# Patient Record
Sex: FEMALE | Race: ASIAN | NOT HISPANIC OR LATINO
[De-identification: names, ages, dates, MRNs, and addresses within clinical notes are randomized per-mention and may not be internally consistent; named-entity substitution may affect disease eponyms.]

---

## 2017-03-01 ENCOUNTER — MED ADMIN CHARGE (OUTPATIENT)
Age: 27
End: 2017-03-01

## 2017-03-01 ENCOUNTER — APPOINTMENT (OUTPATIENT)
Dept: INTERNAL MEDICINE | Facility: CLINIC | Age: 27
End: 2017-03-01

## 2017-03-01 VITALS
TEMPERATURE: 98 F | WEIGHT: 136 LBS | SYSTOLIC BLOOD PRESSURE: 98 MMHG | HEART RATE: 91 BPM | BODY MASS INDEX: 26.7 KG/M2 | HEIGHT: 60 IN | DIASTOLIC BLOOD PRESSURE: 55 MMHG | OXYGEN SATURATION: 98 %

## 2017-03-01 DIAGNOSIS — Z91.89 OTHER SPECIFIED PERSONAL RISK FACTORS, NOT ELSEWHERE CLASSIFIED: ICD-10-CM

## 2017-03-01 DIAGNOSIS — Z00.00 ENCOUNTER FOR GENERAL ADULT MEDICAL EXAMINATION W/OUT ABNORMAL FINDINGS: ICD-10-CM

## 2017-03-01 RX ORDER — SOFT LENS DISINFECTANT
SOLUTION, NON-ORAL MISCELLANEOUS
Qty: 1 | Refills: 0 | Status: ACTIVE | COMMUNITY
Start: 2017-03-01 | End: 1900-01-01

## 2017-04-18 ENCOUNTER — APPOINTMENT (OUTPATIENT)
Dept: INTERNAL MEDICINE | Facility: CLINIC | Age: 27
End: 2017-04-18

## 2017-05-23 ENCOUNTER — APPOINTMENT (OUTPATIENT)
Dept: INTERNAL MEDICINE | Facility: CLINIC | Age: 27
End: 2017-05-23

## 2017-05-23 VITALS
BODY MASS INDEX: 27.48 KG/M2 | SYSTOLIC BLOOD PRESSURE: 100 MMHG | HEIGHT: 60 IN | DIASTOLIC BLOOD PRESSURE: 60 MMHG | WEIGHT: 140 LBS | TEMPERATURE: 98.4 F | HEART RATE: 87 BPM | OXYGEN SATURATION: 99 %

## 2017-05-23 DIAGNOSIS — Z23 ENCOUNTER FOR IMMUNIZATION: ICD-10-CM

## 2017-06-15 ENCOUNTER — APPOINTMENT (OUTPATIENT)
Dept: PULMONOLOGY | Facility: CLINIC | Age: 27
End: 2017-06-15

## 2018-01-12 ENCOUNTER — RX RENEWAL (OUTPATIENT)
Age: 28
End: 2018-01-12

## 2018-01-25 ENCOUNTER — RX RENEWAL (OUTPATIENT)
Age: 28
End: 2018-01-25

## 2018-01-29 ENCOUNTER — APPOINTMENT (OUTPATIENT)
Dept: INTERNAL MEDICINE | Facility: CLINIC | Age: 28
End: 2018-01-29
Payer: MEDICAID

## 2018-01-29 VITALS
SYSTOLIC BLOOD PRESSURE: 92 MMHG | HEART RATE: 95 BPM | WEIGHT: 150 LBS | DIASTOLIC BLOOD PRESSURE: 68 MMHG | TEMPERATURE: 98.2 F | OXYGEN SATURATION: 98 %

## 2018-01-29 DIAGNOSIS — Z23 ENCOUNTER FOR IMMUNIZATION: ICD-10-CM

## 2018-01-29 DIAGNOSIS — Z76.0 ENCOUNTER FOR ISSUE OF REPEAT PRESCRIPTION: ICD-10-CM

## 2018-01-29 DIAGNOSIS — M41.9 SCOLIOSIS, UNSPECIFIED: ICD-10-CM

## 2018-01-29 DIAGNOSIS — J45.901 UNSPECIFIED ASTHMA WITH (ACUTE) EXACERBATION: ICD-10-CM

## 2018-01-29 PROCEDURE — 99214 OFFICE O/P EST MOD 30 MIN: CPT | Mod: GC

## 2018-01-30 PROBLEM — Z76.0 MEDICATION REFILL: Status: ACTIVE | Noted: 2017-03-01

## 2018-01-30 PROBLEM — J45.901 MILD ASTHMA WITH ACUTE EXACERBATION, UNSPECIFIED WHETHER PERSISTENT: Status: ACTIVE | Noted: 2018-01-30

## 2018-02-26 ENCOUNTER — APPOINTMENT (OUTPATIENT)
Dept: PULMONOLOGY | Facility: CLINIC | Age: 28
End: 2018-02-26
Payer: MEDICAID

## 2018-02-26 ENCOUNTER — APPOINTMENT (OUTPATIENT)
Dept: INTERNAL MEDICINE | Facility: CLINIC | Age: 28
End: 2018-02-26
Payer: MEDICAID

## 2018-02-26 VITALS
HEART RATE: 82 BPM | TEMPERATURE: 97.8 F | OXYGEN SATURATION: 98 % | HEIGHT: 60 IN | SYSTOLIC BLOOD PRESSURE: 90 MMHG | WEIGHT: 150 LBS | BODY MASS INDEX: 29.45 KG/M2 | DIASTOLIC BLOOD PRESSURE: 80 MMHG

## 2018-02-26 PROCEDURE — 94060 EVALUATION OF WHEEZING: CPT

## 2018-02-26 PROCEDURE — 99204 OFFICE O/P NEW MOD 45 MIN: CPT | Mod: 25

## 2018-02-26 PROCEDURE — 94729 DIFFUSING CAPACITY: CPT

## 2018-02-26 PROCEDURE — 95012 NITRIC OXIDE EXP GAS DETER: CPT

## 2018-02-26 PROCEDURE — 94727 GAS DIL/WSHOT DETER LNG VOL: CPT

## 2018-02-26 PROCEDURE — 99214 OFFICE O/P EST MOD 30 MIN: CPT | Mod: GC

## 2018-02-26 RX ORDER — IBUPROFEN 800 MG/1
800 TABLET, FILM COATED ORAL
Qty: 16 | Refills: 0 | Status: ACTIVE | COMMUNITY
Start: 2018-01-24

## 2018-03-26 ENCOUNTER — APPOINTMENT (OUTPATIENT)
Dept: PULMONOLOGY | Facility: CLINIC | Age: 28
End: 2018-03-26
Payer: MEDICAID

## 2018-03-26 VITALS
WEIGHT: 150 LBS | HEART RATE: 82 BPM | SYSTOLIC BLOOD PRESSURE: 90 MMHG | DIASTOLIC BLOOD PRESSURE: 70 MMHG | OXYGEN SATURATION: 98 % | TEMPERATURE: 98 F | HEIGHT: 60 IN | BODY MASS INDEX: 29.45 KG/M2

## 2018-03-26 PROCEDURE — 94010 BREATHING CAPACITY TEST: CPT

## 2018-03-26 PROCEDURE — 99213 OFFICE O/P EST LOW 20 MIN: CPT | Mod: 25

## 2018-03-26 RX ORDER — SALMETEROL XINAFOATE 50 UG/1
50 POWDER, METERED ORAL; RESPIRATORY (INHALATION)
Qty: 1 | Refills: 0 | Status: DISCONTINUED | COMMUNITY
Start: 2018-01-29 | End: 2018-03-26

## 2018-05-17 ENCOUNTER — APPOINTMENT (OUTPATIENT)
Dept: PULMONOLOGY | Facility: CLINIC | Age: 28
End: 2018-05-17
Payer: MEDICAID

## 2018-05-17 VITALS
DIASTOLIC BLOOD PRESSURE: 68 MMHG | OXYGEN SATURATION: 97 % | HEART RATE: 73 BPM | HEIGHT: 60 IN | TEMPERATURE: 97.9 F | SYSTOLIC BLOOD PRESSURE: 96 MMHG | BODY MASS INDEX: 29.45 KG/M2 | WEIGHT: 150 LBS

## 2018-05-17 PROCEDURE — 95012 NITRIC OXIDE EXP GAS DETER: CPT

## 2018-05-17 PROCEDURE — 94010 BREATHING CAPACITY TEST: CPT

## 2018-05-17 PROCEDURE — 99213 OFFICE O/P EST LOW 20 MIN: CPT | Mod: 25

## 2018-06-14 ENCOUNTER — RX RENEWAL (OUTPATIENT)
Age: 28
End: 2018-06-14

## 2018-06-18 ENCOUNTER — RX RENEWAL (OUTPATIENT)
Age: 28
End: 2018-06-18

## 2018-07-05 ENCOUNTER — APPOINTMENT (OUTPATIENT)
Dept: PULMONOLOGY | Facility: CLINIC | Age: 28
End: 2018-07-05
Payer: MEDICAID

## 2018-07-05 VITALS
TEMPERATURE: 97.9 F | SYSTOLIC BLOOD PRESSURE: 86 MMHG | DIASTOLIC BLOOD PRESSURE: 62 MMHG | HEIGHT: 60 IN | HEART RATE: 86 BPM | OXYGEN SATURATION: 97 % | WEIGHT: 150 LBS | BODY MASS INDEX: 29.45 KG/M2

## 2018-07-05 PROCEDURE — 94010 BREATHING CAPACITY TEST: CPT

## 2018-07-05 PROCEDURE — 99213 OFFICE O/P EST LOW 20 MIN: CPT | Mod: 25

## 2018-07-05 RX ORDER — SOFT LENS DISINFECTANT
SOLUTION, NON-ORAL MISCELLANEOUS
Qty: 1 | Refills: 0 | Status: ACTIVE | COMMUNITY
Start: 2018-07-05 | End: 1900-01-01

## 2018-09-03 ENCOUNTER — RX RENEWAL (OUTPATIENT)
Age: 28
End: 2018-09-03

## 2018-10-15 ENCOUNTER — RX RENEWAL (OUTPATIENT)
Age: 28
End: 2018-10-15

## 2019-07-14 ENCOUNTER — EMERGENCY (EMERGENCY)
Facility: HOSPITAL | Age: 29
LOS: 1 days | Discharge: HOME | End: 2019-07-14
Attending: EMERGENCY MEDICINE | Admitting: EMERGENCY MEDICINE
Payer: COMMERCIAL

## 2019-07-14 VITALS
TEMPERATURE: 97 F | OXYGEN SATURATION: 100 % | RESPIRATION RATE: 18 BRPM | DIASTOLIC BLOOD PRESSURE: 70 MMHG | HEART RATE: 59 BPM | HEIGHT: 60 IN | WEIGHT: 149.91 LBS | SYSTOLIC BLOOD PRESSURE: 104 MMHG

## 2019-07-14 DIAGNOSIS — K80.20 CALCULUS OF GALLBLADDER WITHOUT CHOLECYSTITIS WITHOUT OBSTRUCTION: ICD-10-CM

## 2019-07-14 DIAGNOSIS — R10.11 RIGHT UPPER QUADRANT PAIN: ICD-10-CM

## 2019-07-14 LAB
ALBUMIN SERPL ELPH-MCNC: 4.5 G/DL — SIGNIFICANT CHANGE UP (ref 3.3–5)
ALP SERPL-CCNC: 86 U/L — SIGNIFICANT CHANGE UP (ref 40–120)
ALT FLD-CCNC: 31 U/L — SIGNIFICANT CHANGE UP (ref 10–45)
ANION GAP SERPL CALC-SCNC: 9 MMOL/L — SIGNIFICANT CHANGE UP (ref 5–17)
APPEARANCE UR: ABNORMAL
AST SERPL-CCNC: 52 U/L — HIGH (ref 10–40)
BASOPHILS # BLD AUTO: 0 K/UL — SIGNIFICANT CHANGE UP (ref 0–0.2)
BASOPHILS NFR BLD AUTO: 0 % — SIGNIFICANT CHANGE UP (ref 0–2)
BILIRUB SERPL-MCNC: 1.5 MG/DL — HIGH (ref 0.2–1.2)
BILIRUB UR-MCNC: NEGATIVE — SIGNIFICANT CHANGE UP
BUN SERPL-MCNC: 13 MG/DL — SIGNIFICANT CHANGE UP (ref 7–23)
CALCIUM SERPL-MCNC: 9.1 MG/DL — SIGNIFICANT CHANGE UP (ref 8.4–10.5)
CHLORIDE SERPL-SCNC: 104 MMOL/L — SIGNIFICANT CHANGE UP (ref 96–108)
CO2 SERPL-SCNC: 27 MMOL/L — SIGNIFICANT CHANGE UP (ref 22–31)
COLOR SPEC: YELLOW — SIGNIFICANT CHANGE UP
CREAT SERPL-MCNC: 0.72 MG/DL — SIGNIFICANT CHANGE UP (ref 0.5–1.3)
DIFF PNL FLD: NEGATIVE — SIGNIFICANT CHANGE UP
EOSINOPHIL # BLD AUTO: 0 K/UL — SIGNIFICANT CHANGE UP (ref 0–0.5)
EOSINOPHIL NFR BLD AUTO: 0 % — SIGNIFICANT CHANGE UP (ref 0–6)
GLUCOSE SERPL-MCNC: 168 MG/DL — HIGH (ref 70–99)
GLUCOSE UR QL: NEGATIVE — SIGNIFICANT CHANGE UP
HCG UR QL: NEGATIVE — SIGNIFICANT CHANGE UP
HCT VFR BLD CALC: 36.7 % — SIGNIFICANT CHANGE UP (ref 34.5–45)
HGB BLD-MCNC: 11.1 G/DL — LOW (ref 11.5–15.5)
KETONES UR-MCNC: NEGATIVE — SIGNIFICANT CHANGE UP
LEUKOCYTE ESTERASE UR-ACNC: NEGATIVE — SIGNIFICANT CHANGE UP
LIDOCAIN IGE QN: 26 U/L — SIGNIFICANT CHANGE UP (ref 7–60)
LYMPHOCYTES # BLD AUTO: 1.09 K/UL — SIGNIFICANT CHANGE UP (ref 1–3.3)
LYMPHOCYTES # BLD AUTO: 7.1 % — LOW (ref 13–44)
MCHC RBC-ENTMCNC: 20.6 PG — LOW (ref 27–34)
MCHC RBC-ENTMCNC: 30.2 GM/DL — LOW (ref 32–36)
MCV RBC AUTO: 68.2 FL — LOW (ref 80–100)
MONOCYTES # BLD AUTO: 0.54 K/UL — SIGNIFICANT CHANGE UP (ref 0–0.9)
MONOCYTES NFR BLD AUTO: 3.5 % — SIGNIFICANT CHANGE UP (ref 2–14)
NEUTROPHILS # BLD AUTO: 13.62 K/UL — HIGH (ref 1.8–7.4)
NEUTROPHILS NFR BLD AUTO: 87.6 % — HIGH (ref 43–77)
NITRITE UR-MCNC: NEGATIVE — SIGNIFICANT CHANGE UP
PH UR: 7 — SIGNIFICANT CHANGE UP (ref 5–8)
PLATELET # BLD AUTO: 319 K/UL — SIGNIFICANT CHANGE UP (ref 150–400)
POTASSIUM SERPL-MCNC: 4.2 MMOL/L — SIGNIFICANT CHANGE UP (ref 3.5–5.3)
POTASSIUM SERPL-SCNC: 4.2 MMOL/L — SIGNIFICANT CHANGE UP (ref 3.5–5.3)
PROT SERPL-MCNC: 7.9 G/DL — SIGNIFICANT CHANGE UP (ref 6–8.3)
PROT UR-MCNC: ABNORMAL MG/DL
RBC # BLD: 5.38 M/UL — HIGH (ref 3.8–5.2)
RBC # FLD: 14.8 % — HIGH (ref 10.3–14.5)
SODIUM SERPL-SCNC: 140 MMOL/L — SIGNIFICANT CHANGE UP (ref 135–145)
SP GR SPEC: 1.02 — SIGNIFICANT CHANGE UP (ref 1–1.03)
UROBILINOGEN FLD QL: 1 E.U./DL — SIGNIFICANT CHANGE UP
WBC # BLD: 15.39 K/UL — HIGH (ref 3.8–10.5)
WBC # FLD AUTO: 15.39 K/UL — HIGH (ref 3.8–10.5)

## 2019-07-14 PROCEDURE — 76705 ECHO EXAM OF ABDOMEN: CPT | Mod: 26

## 2019-07-14 PROCEDURE — 83690 ASSAY OF LIPASE: CPT

## 2019-07-14 PROCEDURE — 76705 ECHO EXAM OF ABDOMEN: CPT

## 2019-07-14 PROCEDURE — 85025 COMPLETE CBC W/AUTO DIFF WBC: CPT

## 2019-07-14 PROCEDURE — 36415 COLL VENOUS BLD VENIPUNCTURE: CPT

## 2019-07-14 PROCEDURE — 96374 THER/PROPH/DIAG INJ IV PUSH: CPT

## 2019-07-14 PROCEDURE — 99284 EMERGENCY DEPT VISIT MOD MDM: CPT | Mod: 25

## 2019-07-14 PROCEDURE — 81025 URINE PREGNANCY TEST: CPT

## 2019-07-14 PROCEDURE — 99285 EMERGENCY DEPT VISIT HI MDM: CPT

## 2019-07-14 PROCEDURE — 80053 COMPREHEN METABOLIC PANEL: CPT

## 2019-07-14 PROCEDURE — 81001 URINALYSIS AUTO W/SCOPE: CPT

## 2019-07-14 PROCEDURE — 96375 TX/PRO/DX INJ NEW DRUG ADDON: CPT

## 2019-07-14 RX ORDER — MORPHINE SULFATE 50 MG/1
4 CAPSULE, EXTENDED RELEASE ORAL ONCE
Refills: 0 | Status: DISCONTINUED | OUTPATIENT
Start: 2019-07-14 | End: 2019-07-14

## 2019-07-14 RX ORDER — SODIUM CHLORIDE 9 MG/ML
1000 INJECTION INTRAMUSCULAR; INTRAVENOUS; SUBCUTANEOUS ONCE
Refills: 0 | Status: COMPLETED | OUTPATIENT
Start: 2019-07-14 | End: 2019-07-14

## 2019-07-14 RX ORDER — FAMOTIDINE 10 MG/ML
20 INJECTION INTRAVENOUS ONCE
Refills: 0 | Status: COMPLETED | OUTPATIENT
Start: 2019-07-14 | End: 2019-07-14

## 2019-07-14 RX ADMIN — SODIUM CHLORIDE 1000 MILLILITER(S): 9 INJECTION INTRAMUSCULAR; INTRAVENOUS; SUBCUTANEOUS at 16:54

## 2019-07-14 RX ADMIN — FAMOTIDINE 20 MILLIGRAM(S): 10 INJECTION INTRAVENOUS at 16:54

## 2019-07-14 RX ADMIN — MORPHINE SULFATE 4 MILLIGRAM(S): 50 CAPSULE, EXTENDED RELEASE ORAL at 16:54

## 2019-07-14 NOTE — ED PROVIDER NOTE - PHYSICAL EXAMINATION
CONSTITUTIONAL: Well-appearing; well-nourished; in no apparent distress.   HEAD: Normocephalic; atraumatic.   EYES: PERRL; EOM intact; conjunctiva and sclera clear  ENT: normal nose; no rhinorrhea; normal pharynx with no erythema or lesions.   NECK: Supple; non-tender; no LAD  CARDIOVASCULAR: Normal S1, S2; no murmurs, rubs, or gallops. Regular rate and rhythm.   RESPIRATORY: Breathing easily; breath sounds clear and equal bilaterally; no wheezes, rhonchi, or rales.  GI: Soft; non-distended; RUQ tenderness to palpation; no rebound, no guarding  MSK: FROM at all extremities, normal tone   EXT: No cyanosis or edema; N/V intact  SKIN: Normal for age and race; warm; dry; good turgor; no apparent lesions or rash.   NEURO: A & O x 3; face symmetric; grossly unremarkable.   PSYCHOLOGICAL: The patient’s mood and manner are appropriate.

## 2019-07-14 NOTE — ED PROVIDER NOTE - OBJECTIVE STATEMENT
29 y/o F with a past medical history of asthma and thalassemia presents to the ED complaining of epigastric pain that radiates to her back. Patient woke up this morning and was able to PO coffee and pancakes. Later in the day, she experienced abdominal pain and notes increased bowel movement with soft stool. She denies diarrhea, bloody stool, dysuria, nausea, vomiting, and recent travel or sick contact. Patient has no LMP as she has a IUD. 27 y/o F with a past medical history of asthma and thalassemia presents to the ED complaining of epigastric pain that radiates to her back. Patient woke up this morning and was able to eat coffee and pancakes. Later in the day, she experienced abdominal pain and notes increased bowel movement with soft stool. She denies diarrhea, bloody stool, dysuria, nausea, vomiting, and recent travel or sick contact. Denies daily etoh or NSAID use. Denies smoking. Patient has no LMP as she has a IUD.

## 2019-07-14 NOTE — ED PROVIDER NOTE - ATTENDING CONTRIBUTION TO CARE
Hx of asthma, thalassemia w epigastric pain going to back, no f/c, soft stool non bloody noted today, low appetite. sx ongoing for one day. Well appearing, nad, nc/at, lung cta, heart reg, abd soft, tender in ruq,  ext no gross deformity, no gross neuro deficits, obtaining labs, US and reevaluation. Hx of asthma, thalassemia w epigastric pain going to back, no f/c, soft stool non bloody noted today, low appetite. sx ongoing for one day. Well appearing, nad, nc/at, lung cta, heart reg, abd soft, mild epigastric tenderness,  ext no gross deformity, no gross neuro deficits, obtaining labs, US and reevaluation.

## 2019-07-14 NOTE — ED ADULT TRIAGE NOTE - ARRIVAL INFO ADDITIONAL COMMENTS
c.o diffuse abd pain and multiple episodes of soft stool since this morning. denies any nausea, vomiting, fever/chills

## 2019-07-14 NOTE — ED PROVIDER NOTE - CLINICAL SUMMARY MEDICAL DECISION MAKING FREE TEXT BOX
29 y/o F with history of asthma and thalassemia complains of epigastric pain  that radiates to her back. Increased sense of belching and soft stools; afebrile. On exam with RUQ tenderness to palpation. Will order ultrasound of abdomen to rule out cholecystitis.

## 2019-07-14 NOTE — ED PROVIDER NOTE - NSFOLLOWUPINSTRUCTIONS_ED_ALL_ED_FT
Gallstones    WHAT YOU NEED TO KNOW:    Gallstones are hard substances that form in your gallbladder or bile duct. Your gallbladder and bile duct are located on the right side of your abdomen, near your liver. Your gallbladder stores bile. Bile helps break down the fat that you eat. Your gallbladder also helps remove certain chemicals from your body.Gallstones         DISCHARGE INSTRUCTIONS:    Return to the emergency department if:     You have a fever and chills.      Your skin or eyes turn yellow.      You have severe pain in your upper abdomen, just below the right ribcage.    Contact your healthcare provider if:     You have nausea and are vomiting.      Your urine is dark.      You have maryann-colored bowel movements.      You have questions or concerns about your condition or care.    Medicines:     Prescription pain medicine may be given. Ask your healthcare provider how to take this medicine safely.      Take your medicine as directed. Contact your healthcare provider if you think your medicine is not helping or if you have side effects. Tell him or her if you are allergic to any medicine. Keep a list of the medicines, vitamins, and herbs you take. Include the amounts, and when and why you take them. Bring the list or the pill bottles to follow-up visits. Carry your medicine list with you in case of an emergency.    What you can do to manage or prevent gallstones:     Eat a variety of healthy foods. This may help you have more energy and heal faster. Healthy foods include fruits, vegetables, whole-grain breads, low-fat dairy products, beans, lean meat, and fish. Ask if you need to be on a special diet. Try to eat regular meals during the day. This will help your gallbladder empty.      Exercise as directed. Talk to your healthcare provider about the best exercise plan for you. Exercise can help you lose weight and improve your health.      Manage your weight. If you are overweight, it is important to reach a healthy weight. You will need to lose weight slowly because rapid weight loss can increase your risk for gallstones. Talk to your healthcare provider about your weight. He or she can help you create a safe weight loss plan if you need to lose weight.    Follow up with your healthcare provider as directed: Write down your questions so you remember to ask them during your visits.     Biliary Colic    WHAT YOU NEED TO KNOW:    Biliary colic is severe pain in your upper abdomen caused by a gallbladder problem. Your gallbladder stores bile, which helps break down the fats that you eat. Gallbladder         DISCHARGE INSTRUCTIONS:    Medicines:     Medicines can help decrease pain and muscle spasms. You may also need medicine to calm your stomach and stop vomiting.      Take your medicine as directed. Contact your healthcare provider if you think your medicine is not helping or you have side effects. Tell him if you are allergic to any medicine. Keep a list of the medicines, vitamins, and herbs you take. Include the amounts, and when and why you take them. Bring the list or the pill bottles to follow-up visits. Carry your medicine list with you in case of an emergency.    Avoid alcohol: Alcohol can damage your gallbladder and make your symptoms worse.    Maintain a healthy weight: Ask your healthcare provider how much you should weigh. Ask him to help you create a weight loss plan if you are overweight.     Eat a variety of healthy foods: Healthy foods include fruits, vegetables, whole-grain breads, low-fat dairy products, beans, lean meats, and fish. Foods that are high in fiber and low in fat and cholesterol may decrease your symptoms. Ask if you need to be on a special diet.    Exercise: Ask your healthcare provider about the best exercise plan for you. Exercise may help improve your symptoms.    Follow up with your healthcare provider as directed: Bring a list of any questions you have so you remember to ask them during your visits.     Contact your healthcare provider if:     You have a fever.      Your pain gets worse, even after you take medicine.      You have nausea or are vomiting.      Your skin or eyes are yellow.      You have questions or concerns about your condition or care.    Return to the emergency department if:     You have severe pain.      You feel like you are going to faint.      You are short of breath.      Avoid the following foods for a healthy gallbladder diet:   vegetable oil  peanut oil  refined white foods (breads, pastas, etc.)  foods high in fat   processed foods

## 2019-07-14 NOTE — ED ADULT NURSE NOTE - OBJECTIVE STATEMENT
Patient is a 27yo F, arrived to ED via walk-in, AAOx3, in NAD, VSS, complaining of abdominal pain, back pain and soft stools since this morning.  Patient denies any N/V/D, fevers, chills, urinary complaints or any other complaints at this time.  PIV placed, labs drawn and sent.

## 2019-07-14 NOTE — ED PROVIDER NOTE - CARE PROVIDER_API CALL
Mario Lane (MD)  Surgery  [address], [place] [address]  Phone: [phone]  Fax: [phone]  Follow Up Time:     Olivia Bryson)  Surgery  [address], [address]  [place] [address]  Phone: [phone]  Fax: [phone]  Follow Up Time:

## 2019-07-22 ENCOUNTER — EMERGENCY (EMERGENCY)
Facility: HOSPITAL | Age: 29
LOS: 1 days | Discharge: HOME | End: 2019-07-22
Attending: EMERGENCY MEDICINE | Admitting: EMERGENCY MEDICINE
Payer: MEDICAID

## 2019-07-22 VITALS
SYSTOLIC BLOOD PRESSURE: 100 MMHG | TEMPERATURE: 98 F | HEART RATE: 91 BPM | HEIGHT: 60 IN | WEIGHT: 149.91 LBS | DIASTOLIC BLOOD PRESSURE: 63 MMHG | OXYGEN SATURATION: 100 % | RESPIRATION RATE: 16 BRPM

## 2019-07-22 VITALS
DIASTOLIC BLOOD PRESSURE: 70 MMHG | SYSTOLIC BLOOD PRESSURE: 108 MMHG | HEART RATE: 90 BPM | TEMPERATURE: 98 F | OXYGEN SATURATION: 100 % | RESPIRATION RATE: 16 BRPM

## 2019-07-22 PROBLEM — D56.9 THALASSEMIA, UNSPECIFIED: Chronic | Status: ACTIVE | Noted: 2019-07-14

## 2019-07-22 PROBLEM — J45.909 UNSPECIFIED ASTHMA, UNCOMPLICATED: Chronic | Status: ACTIVE | Noted: 2019-07-14

## 2019-07-22 LAB
ALBUMIN SERPL ELPH-MCNC: 4.3 G/DL — SIGNIFICANT CHANGE UP (ref 3.3–5)
ALP SERPL-CCNC: 91 U/L — SIGNIFICANT CHANGE UP (ref 40–120)
ALT FLD-CCNC: SIGNIFICANT CHANGE UP U/L (ref 10–45)
ANION GAP SERPL CALC-SCNC: 10 MMOL/L — SIGNIFICANT CHANGE UP (ref 5–17)
AST SERPL-CCNC: SIGNIFICANT CHANGE UP U/L (ref 10–40)
BASOPHILS # BLD AUTO: 0 K/UL — SIGNIFICANT CHANGE UP (ref 0–0.2)
BASOPHILS NFR BLD AUTO: 0 % — SIGNIFICANT CHANGE UP (ref 0–2)
BILIRUB SERPL-MCNC: 0.4 MG/DL — SIGNIFICANT CHANGE UP (ref 0.2–1.2)
BUN SERPL-MCNC: 13 MG/DL — SIGNIFICANT CHANGE UP (ref 7–23)
CALCIUM SERPL-MCNC: 8.8 MG/DL — SIGNIFICANT CHANGE UP (ref 8.4–10.5)
CHLORIDE SERPL-SCNC: 104 MMOL/L — SIGNIFICANT CHANGE UP (ref 96–108)
CO2 SERPL-SCNC: 26 MMOL/L — SIGNIFICANT CHANGE UP (ref 22–31)
CREAT SERPL-MCNC: 0.65 MG/DL — SIGNIFICANT CHANGE UP (ref 0.5–1.3)
EOSINOPHIL # BLD AUTO: 0.98 K/UL — HIGH (ref 0–0.5)
EOSINOPHIL NFR BLD AUTO: 10.1 % — HIGH (ref 0–6)
GLUCOSE SERPL-MCNC: 101 MG/DL — HIGH (ref 70–99)
HCT VFR BLD CALC: 35.4 % — SIGNIFICANT CHANGE UP (ref 34.5–45)
HGB BLD-MCNC: 10.6 G/DL — LOW (ref 11.5–15.5)
LIDOCAIN IGE QN: 33 U/L — SIGNIFICANT CHANGE UP (ref 7–60)
LYMPHOCYTES # BLD AUTO: 2.22 K/UL — SIGNIFICANT CHANGE UP (ref 1–3.3)
LYMPHOCYTES # BLD AUTO: 22.9 % — SIGNIFICANT CHANGE UP (ref 13–44)
MCHC RBC-ENTMCNC: 20.4 PG — LOW (ref 27–34)
MCHC RBC-ENTMCNC: 29.9 GM/DL — LOW (ref 32–36)
MCV RBC AUTO: 68.1 FL — LOW (ref 80–100)
MONOCYTES # BLD AUTO: 0.36 K/UL — SIGNIFICANT CHANGE UP (ref 0–0.9)
MONOCYTES NFR BLD AUTO: 3.7 % — SIGNIFICANT CHANGE UP (ref 2–14)
NEUTROPHILS # BLD AUTO: 6.14 K/UL — SIGNIFICANT CHANGE UP (ref 1.8–7.4)
NEUTROPHILS NFR BLD AUTO: 54.1 % — SIGNIFICANT CHANGE UP (ref 43–77)
PLATELET # BLD AUTO: 295 K/UL — SIGNIFICANT CHANGE UP (ref 150–400)
POTASSIUM SERPL-MCNC: SIGNIFICANT CHANGE UP MMOL/L (ref 3.5–5.3)
POTASSIUM SERPL-SCNC: SIGNIFICANT CHANGE UP MMOL/L (ref 3.5–5.3)
PROT SERPL-MCNC: 7.5 G/DL — SIGNIFICANT CHANGE UP (ref 6–8.3)
RBC # BLD: 5.2 M/UL — SIGNIFICANT CHANGE UP (ref 3.8–5.2)
RBC # FLD: 14.8 % — HIGH (ref 10.3–14.5)
SODIUM SERPL-SCNC: 140 MMOL/L — SIGNIFICANT CHANGE UP (ref 135–145)
WBC # BLD: 9.7 K/UL — SIGNIFICANT CHANGE UP (ref 3.8–10.5)
WBC # FLD AUTO: 9.7 K/UL — SIGNIFICANT CHANGE UP (ref 3.8–10.5)

## 2019-07-22 PROCEDURE — 80053 COMPREHEN METABOLIC PANEL: CPT

## 2019-07-22 PROCEDURE — 76705 ECHO EXAM OF ABDOMEN: CPT

## 2019-07-22 PROCEDURE — 99285 EMERGENCY DEPT VISIT HI MDM: CPT

## 2019-07-22 PROCEDURE — 36415 COLL VENOUS BLD VENIPUNCTURE: CPT

## 2019-07-22 PROCEDURE — 85025 COMPLETE CBC W/AUTO DIFF WBC: CPT

## 2019-07-22 PROCEDURE — 96374 THER/PROPH/DIAG INJ IV PUSH: CPT

## 2019-07-22 PROCEDURE — 83690 ASSAY OF LIPASE: CPT

## 2019-07-22 PROCEDURE — 76705 ECHO EXAM OF ABDOMEN: CPT | Mod: 26

## 2019-07-22 PROCEDURE — 96375 TX/PRO/DX INJ NEW DRUG ADDON: CPT

## 2019-07-22 PROCEDURE — 99284 EMERGENCY DEPT VISIT MOD MDM: CPT | Mod: 25

## 2019-07-22 RX ORDER — MORPHINE SULFATE 50 MG/1
4 CAPSULE, EXTENDED RELEASE ORAL ONCE
Refills: 0 | Status: DISCONTINUED | OUTPATIENT
Start: 2019-07-22 | End: 2019-07-22

## 2019-07-22 RX ORDER — FAMOTIDINE 10 MG/ML
20 INJECTION INTRAVENOUS ONCE
Refills: 0 | Status: COMPLETED | OUTPATIENT
Start: 2019-07-22 | End: 2019-07-22

## 2019-07-22 RX ORDER — FAMOTIDINE 10 MG/ML
1 INJECTION INTRAVENOUS
Qty: 14 | Refills: 0
Start: 2019-07-22 | End: 2019-07-28

## 2019-07-22 RX ORDER — SODIUM CHLORIDE 9 MG/ML
1000 INJECTION INTRAMUSCULAR; INTRAVENOUS; SUBCUTANEOUS ONCE
Refills: 0 | Status: COMPLETED | OUTPATIENT
Start: 2019-07-22 | End: 2019-07-22

## 2019-07-22 RX ADMIN — SODIUM CHLORIDE 1000 MILLILITER(S): 9 INJECTION INTRAMUSCULAR; INTRAVENOUS; SUBCUTANEOUS at 05:50

## 2019-07-22 RX ADMIN — MORPHINE SULFATE 4 MILLIGRAM(S): 50 CAPSULE, EXTENDED RELEASE ORAL at 05:50

## 2019-07-22 RX ADMIN — FAMOTIDINE 20 MILLIGRAM(S): 10 INJECTION INTRAVENOUS at 05:48

## 2019-07-22 NOTE — ED ADULT NURSE NOTE - NSIMPLEMENTINTERV_GEN_ALL_ED
Implemented All Universal Safety Interventions:  [employer] to call system. Call bell, personal items and telephone within reach. Instruct patient to call for assistance. Room bathroom lighting operational. Non-slip footwear when patient is off stretcher. Physically safe environment: no spills, clutter or unnecessary equipment. Stretcher in lowest position, wheels locked, appropriate side rails in place.

## 2019-07-22 NOTE — ED PROVIDER NOTE - NSFOLLOWUPINSTRUCTIONS_ED_ALL_ED_FT
Biliary Colic    WHAT YOU NEED TO KNOW:  Biliary colic is severe pain in your upper abdomen caused by a gallbladder problem. Your gallbladder stores bile, which helps break down the fats that you eat. Gallbladder  DISCHARGE INSTRUCTIONS:  Medicines:   Medicines can help decrease pain and muscle spasms. You may also need medicine to calm your stomach and stop vomiting.  Take your medicine as directed. Contact your healthcare provider if you think your medicine is not helping or you have side effects. Tell him if you are allergic to any medicine. Keep a list of the medicines, vitamins, and herbs you take. Include the amounts, and when and why you take them. Bring the list or the pill bottles to follow-up visits. Carry your medicine list with you in case of an emergency.  Avoid alcohol: Alcohol can damage your gallbladder and make your symptoms worse.  Maintain a healthy weight: Ask your healthcare provider how much you should weigh. Ask him to help you create a weight loss plan if you are overweight.   Eat a variety of healthy foods: Healthy foods include fruits, vegetables, whole-grain breads, low-fat dairy products, beans, lean meats, and fish. Foods that are high in fiber and low in fat and cholesterol may decrease your symptoms. Ask if you need to be on a special diet.  Exercise: Ask your healthcare provider about the best exercise plan for you. Exercise may help improve your symptoms.  Follow up with your healthcare provider as directed: Bring a list of any questions you have so you remember to ask them during your visits.   Contact your healthcare provider if:   You have a fever.  Your pain gets worse, even after you take medicine.  You have nausea or are vomiting.  Your skin or eyes are yellow.  You have questions or concerns about your condition or care.  Return to the emergency department if:   You have severe pain.  You feel like you are going to faint.  You are short of breath.

## 2019-07-22 NOTE — ED PROVIDER NOTE - ATTENDING CONTRIBUTION TO CARE
Attending Statement: I have personally performed a face to face diagnostic evaluation on this patient. I have reviewed the ACP note and agree with the history, exam and plan of care, except as noted.     Attending Contribution to Care:  28F with h/o gallstones who p/w sx c/w biliary colic after dietary indiscretion. Labs with no emergent findings, bili and LFts normal. Repeat US neg for cholecystitis. Pts pain completely resolved and thus she is stable for outpt follow up, return precautions and diet modifications discussed at length, will start h2 blocker, prn pain meds, to f/u w/surg and gi, pt understands and agrees w/plan

## 2019-07-22 NOTE — ED ADULT TRIAGE NOTE - CHIEF COMPLAINT QUOTE
c/o abdominal pain x 1 hour. epigastric area, states she was here x 2 days ago and was told she had gallstones.

## 2019-07-22 NOTE — ED PROVIDER NOTE - CLINICAL SUMMARY MEDICAL DECISION MAKING FREE TEXT BOX
pt was dx'd w/gallstones on 7/14, last night ate fried foods and started to have abd pain after, will check labs and repeat us to eval for acute cholecystitis, ivf and meds for symptom control, labs pt was dx'd w/gallstones on 7/14, last night ate fried foods and started to have abd pain after, will check labs and repeat us to eval for acute cholecystitis, ivf and meds for symptom control, labs wnl, diet modifications discussed at length, will start h2 blocker, prn pain meds, to f/u w/surg and gi, pt understands and agrees w/plan pt was dx'd w/gallstones on 7/14, last night ate fried foods and started to have abd pain after, will check labs and repeat us to eval for acute cholecystitis although suspect biliary colic, ivf and meds for symptom control, labs wnl, diet modifications discussed at length, will start h2 blocker, prn pain meds, to f/u w/surg and gi, pt understands and agrees w/plan

## 2019-07-22 NOTE — ED PROVIDER NOTE - OBJECTIVE STATEMENT
The pt is a 29 y/o F, who presents to ED c/o R sided abd pain - pt was seen on 7/14 and dx'd w/gallstones, yest ate lomein and fried finger foods, pain started shortly after. Pain is 7/10, constant and spasm like, pain to R upper abd and radiates to the back, has not taken any pain meds. Denies cp, sob, n/v/d, fevers, chills, dysuria

## 2019-07-26 DIAGNOSIS — R10.9 UNSPECIFIED ABDOMINAL PAIN: ICD-10-CM

## 2019-07-26 DIAGNOSIS — K80.50 CALCULUS OF BILE DUCT WITHOUT CHOLANGITIS OR CHOLECYSTITIS WITHOUT OBSTRUCTION: ICD-10-CM

## 2019-07-26 DIAGNOSIS — R10.11 RIGHT UPPER QUADRANT PAIN: ICD-10-CM

## 2019-07-26 DIAGNOSIS — R10.13 EPIGASTRIC PAIN: ICD-10-CM

## 2020-01-31 ENCOUNTER — APPOINTMENT (OUTPATIENT)
Dept: PULMONOLOGY | Facility: CLINIC | Age: 30
End: 2020-01-31
Payer: MEDICAID

## 2020-01-31 VITALS
HEIGHT: 60 IN | HEART RATE: 80 BPM | BODY MASS INDEX: 28.66 KG/M2 | SYSTOLIC BLOOD PRESSURE: 120 MMHG | WEIGHT: 146 LBS | OXYGEN SATURATION: 98 % | DIASTOLIC BLOOD PRESSURE: 80 MMHG | TEMPERATURE: 98 F

## 2020-01-31 PROCEDURE — 99213 OFFICE O/P EST LOW 20 MIN: CPT | Mod: 25

## 2020-01-31 PROCEDURE — G0008: CPT

## 2020-01-31 PROCEDURE — 90686 IIV4 VACC NO PRSV 0.5 ML IM: CPT

## 2020-01-31 PROCEDURE — 94010 BREATHING CAPACITY TEST: CPT

## 2020-01-31 RX ORDER — AZITHROMYCIN 250 MG/1
250 TABLET, FILM COATED ORAL
Qty: 6 | Refills: 0 | Status: DISCONTINUED | COMMUNITY
Start: 2017-12-28 | End: 2020-01-31

## 2020-01-31 RX ORDER — FLUTICASONE FUROATE 200 UG/1
200 POWDER RESPIRATORY (INHALATION) DAILY
Qty: 1 | Refills: 11 | Status: DISCONTINUED | COMMUNITY
Start: 2018-05-17 | End: 2020-01-31

## 2020-01-31 NOTE — HISTORY OF PRESENT ILLNESS
[TextBox_4] : 2/26/18: Asked to evaluate patient by Dr Moore for asthma. She was seen at [facility] a month ago on Flovent and was increased to Flovent 220mcg 2 puffs bid + Serevent bid. Misses 1-2x a week. On this regimen she needs her rescue less, 1-2x a week. Sx are dyspnea and wheeze. No more nocturnal awakenings. Has been hospitalized in the [facility] 3-4x a year, none in the last year. Maybe 1 course steroids last year. 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 Traveling to [facility] soon.\par \par 3/26/18: Using Breo daily and no longer needing rescue, no nocturnal awakenings, no limitations. On montelukast. Traveling to [facility] in about 3 weeks.\par \par 5/17/18: Went to the [facility] and got her 4 girls and is stressed. Running low on Ventolin. Needed neb last night. She returned a week ago or so - she can't remember and refers to her small daughter. Needing albuterol qod or so. Taking Breo every night, misses it twice a week. Still on montelukast. No prednisone since seeing me last.\par \par 7/5/18 [[place]]: Patient returned for a scheduled visit. She has not picked up her Arnuity but has not had symptoms since her last visit. She states that she has not used her rescue inhaler at all, and is fully compliant with her Breo Ellipta. She requested a new nebulizer though as her last one broke down and she would like to have one in case she experiences acute respiratory symptoms. \par  \par 1/31/20: Doing well on Breo, barely needs albuterol, no nocturnal symptoms, no activity limitation. Got back last time she ran out of Breo.

## 2020-01-31 NOTE — PHYSICAL EXAM
[No Acute Distress] : no acute distress [Normal S1, S2] : normal s1, s2 [Normal Rate/Rhythm] : normal rate/rhythm [No Resp Distress] : no resp distress [No Murmurs] : no murmurs [Clear to Auscultation Bilaterally] : clear to auscultation bilaterally

## 2020-01-31 NOTE — ASSESSMENT
[FreeTextEntry1] : Data reviewed:\par \par PA/lat CXR [facility] 12/28/17: clear lungs\par \par [place] 2/26/18: severe obstruction, FEV1 49% / FENO 93\par PFT 2/26/18: moderate obstruction, FEV1 57%, sig response to IBD, normal volumes and DLCO\par [place] 3/26/18: mod obstruction, FEV1 64%\par [place] 5/17/18: mod obstruction, FEV1 69% / FENO 79\par [place] 7/5/18: mod obstruction, FEV1 69%\par [place] 1/31/20: mild obstruction, FEV1 72%\par \par Impression:\par Moderate persistent asthma, well-controlled\par \par Plan:\par Continue Breo.\par If cont to do well then at next visit can consider stepdown.\par Flu vaccine given.\par Can follow 1 yr / sooner as needed.

## 2020-02-21 ENCOUNTER — EMERGENCY (EMERGENCY)
Facility: HOSPITAL | Age: 30
LOS: 1 days | Discharge: HOME | End: 2020-02-21
Attending: EMERGENCY MEDICINE | Admitting: EMERGENCY MEDICINE
Payer: MEDICAID

## 2020-02-21 VITALS
HEIGHT: 60 IN | TEMPERATURE: 98 F | DIASTOLIC BLOOD PRESSURE: 65 MMHG | HEART RATE: 82 BPM | OXYGEN SATURATION: 98 % | SYSTOLIC BLOOD PRESSURE: 99 MMHG | WEIGHT: 149.91 LBS | RESPIRATION RATE: 18 BRPM

## 2020-02-21 VITALS
HEART RATE: 92 BPM | SYSTOLIC BLOOD PRESSURE: 115 MMHG | TEMPERATURE: 98 F | OXYGEN SATURATION: 100 % | RESPIRATION RATE: 18 BRPM | DIASTOLIC BLOOD PRESSURE: 69 MMHG

## 2020-02-21 DIAGNOSIS — Z98.891 HISTORY OF UTERINE SCAR FROM PREVIOUS SURGERY: Chronic | ICD-10-CM

## 2020-02-21 LAB
ALBUMIN SERPL ELPH-MCNC: 4.5 G/DL — SIGNIFICANT CHANGE UP (ref 3.3–5)
ALP SERPL-CCNC: 78 U/L — SIGNIFICANT CHANGE UP (ref 40–120)
ALT FLD-CCNC: 28 U/L — SIGNIFICANT CHANGE UP (ref 10–45)
ANION GAP SERPL CALC-SCNC: 11 MMOL/L — SIGNIFICANT CHANGE UP (ref 5–17)
APPEARANCE UR: CLEAR — SIGNIFICANT CHANGE UP
AST SERPL-CCNC: 27 U/L — SIGNIFICANT CHANGE UP (ref 10–40)
BASOPHILS # BLD AUTO: 0 K/UL — SIGNIFICANT CHANGE UP (ref 0–0.2)
BASOPHILS NFR BLD AUTO: 0 % — SIGNIFICANT CHANGE UP (ref 0–2)
BILIRUB SERPL-MCNC: 0.6 MG/DL — SIGNIFICANT CHANGE UP (ref 0.2–1.2)
BILIRUB UR-MCNC: NEGATIVE — SIGNIFICANT CHANGE UP
BUN SERPL-MCNC: 9 MG/DL — SIGNIFICANT CHANGE UP (ref 7–23)
CALCIUM SERPL-MCNC: 8.6 MG/DL — SIGNIFICANT CHANGE UP (ref 8.4–10.5)
CHLORIDE SERPL-SCNC: 101 MMOL/L — SIGNIFICANT CHANGE UP (ref 96–108)
CO2 SERPL-SCNC: 26 MMOL/L — SIGNIFICANT CHANGE UP (ref 22–31)
COLOR SPEC: YELLOW — SIGNIFICANT CHANGE UP
CREAT SERPL-MCNC: 0.67 MG/DL — SIGNIFICANT CHANGE UP (ref 0.5–1.3)
DIFF PNL FLD: NEGATIVE — SIGNIFICANT CHANGE UP
EOSINOPHIL # BLD AUTO: 0.83 K/UL — HIGH (ref 0–0.5)
EOSINOPHIL NFR BLD AUTO: 6.2 % — HIGH (ref 0–6)
GLUCOSE SERPL-MCNC: 112 MG/DL — HIGH (ref 70–99)
GLUCOSE UR QL: NEGATIVE — SIGNIFICANT CHANGE UP
HCG SERPL-ACNC: <0 MIU/ML — SIGNIFICANT CHANGE UP
HCT VFR BLD CALC: 39.5 % — SIGNIFICANT CHANGE UP (ref 34.5–45)
HGB BLD-MCNC: 11.7 G/DL — SIGNIFICANT CHANGE UP (ref 11.5–15.5)
KETONES UR-MCNC: ABNORMAL MG/DL
LEUKOCYTE ESTERASE UR-ACNC: NEGATIVE — SIGNIFICANT CHANGE UP
LIDOCAIN IGE QN: 25 U/L — SIGNIFICANT CHANGE UP (ref 7–60)
LYMPHOCYTES # BLD AUTO: 0.82 K/UL — LOW (ref 1–3.3)
LYMPHOCYTES # BLD AUTO: 6.1 % — LOW (ref 13–44)
MAGNESIUM SERPL-MCNC: 1.9 MG/DL — SIGNIFICANT CHANGE UP (ref 1.6–2.6)
MCHC RBC-ENTMCNC: 20.8 PG — LOW (ref 27–34)
MCHC RBC-ENTMCNC: 29.6 GM/DL — LOW (ref 32–36)
MCV RBC AUTO: 70.3 FL — LOW (ref 80–100)
MONOCYTES # BLD AUTO: 0.47 K/UL — SIGNIFICANT CHANGE UP (ref 0–0.9)
MONOCYTES NFR BLD AUTO: 3.5 % — SIGNIFICANT CHANGE UP (ref 2–14)
NEUTROPHILS # BLD AUTO: 11.27 K/UL — HIGH (ref 1.8–7.4)
NEUTROPHILS NFR BLD AUTO: 84.2 % — HIGH (ref 43–77)
NITRITE UR-MCNC: NEGATIVE — SIGNIFICANT CHANGE UP
PH UR: 7 — SIGNIFICANT CHANGE UP (ref 5–8)
PLATELET # BLD AUTO: 302 K/UL — SIGNIFICANT CHANGE UP (ref 150–400)
POTASSIUM SERPL-MCNC: 3.9 MMOL/L — SIGNIFICANT CHANGE UP (ref 3.5–5.3)
POTASSIUM SERPL-SCNC: 3.9 MMOL/L — SIGNIFICANT CHANGE UP (ref 3.5–5.3)
PROT SERPL-MCNC: 7.4 G/DL — SIGNIFICANT CHANGE UP (ref 6–8.3)
PROT UR-MCNC: 30 MG/DL
RBC # BLD: 5.62 M/UL — HIGH (ref 3.8–5.2)
RBC # FLD: 15.6 % — HIGH (ref 10.3–14.5)
SODIUM SERPL-SCNC: 138 MMOL/L — SIGNIFICANT CHANGE UP (ref 135–145)
SP GR SPEC: 1.02 — SIGNIFICANT CHANGE UP (ref 1–1.03)
UROBILINOGEN FLD QL: 0.2 E.U./DL — SIGNIFICANT CHANGE UP
WBC # BLD: 13.38 K/UL — HIGH (ref 3.8–10.5)
WBC # FLD AUTO: 13.38 K/UL — HIGH (ref 3.8–10.5)

## 2020-02-21 PROCEDURE — 99284 EMERGENCY DEPT VISIT MOD MDM: CPT

## 2020-02-21 PROCEDURE — 36415 COLL VENOUS BLD VENIPUNCTURE: CPT

## 2020-02-21 PROCEDURE — 84702 CHORIONIC GONADOTROPIN TEST: CPT

## 2020-02-21 PROCEDURE — 99284 EMERGENCY DEPT VISIT MOD MDM: CPT | Mod: 25

## 2020-02-21 PROCEDURE — 81001 URINALYSIS AUTO W/SCOPE: CPT

## 2020-02-21 PROCEDURE — 96374 THER/PROPH/DIAG INJ IV PUSH: CPT

## 2020-02-21 PROCEDURE — 80053 COMPREHEN METABOLIC PANEL: CPT

## 2020-02-21 PROCEDURE — 83690 ASSAY OF LIPASE: CPT

## 2020-02-21 PROCEDURE — 83735 ASSAY OF MAGNESIUM: CPT

## 2020-02-21 PROCEDURE — 96375 TX/PRO/DX INJ NEW DRUG ADDON: CPT

## 2020-02-21 PROCEDURE — 85025 COMPLETE CBC W/AUTO DIFF WBC: CPT

## 2020-02-21 RX ORDER — FAMOTIDINE 10 MG/ML
20 INJECTION INTRAVENOUS ONCE
Refills: 0 | Status: COMPLETED | OUTPATIENT
Start: 2020-02-21 | End: 2020-02-21

## 2020-02-21 RX ORDER — SODIUM CHLORIDE 9 MG/ML
1000 INJECTION INTRAMUSCULAR; INTRAVENOUS; SUBCUTANEOUS ONCE
Refills: 0 | Status: COMPLETED | OUTPATIENT
Start: 2020-02-21 | End: 2020-02-21

## 2020-02-21 RX ORDER — KETOROLAC TROMETHAMINE 30 MG/ML
30 SYRINGE (ML) INJECTION ONCE
Refills: 0 | Status: DISCONTINUED | OUTPATIENT
Start: 2020-02-21 | End: 2020-02-21

## 2020-02-21 RX ORDER — ONDANSETRON 8 MG/1
1 TABLET, FILM COATED ORAL
Qty: 9 | Refills: 0
Start: 2020-02-21 | End: 2020-02-23

## 2020-02-21 RX ORDER — ONDANSETRON 8 MG/1
4 TABLET, FILM COATED ORAL ONCE
Refills: 0 | Status: COMPLETED | OUTPATIENT
Start: 2020-02-21 | End: 2020-02-21

## 2020-02-21 RX ADMIN — SODIUM CHLORIDE 1000 MILLILITER(S): 9 INJECTION INTRAMUSCULAR; INTRAVENOUS; SUBCUTANEOUS at 04:35

## 2020-02-21 RX ADMIN — SODIUM CHLORIDE 1000 MILLILITER(S): 9 INJECTION INTRAMUSCULAR; INTRAVENOUS; SUBCUTANEOUS at 04:38

## 2020-02-21 RX ADMIN — FAMOTIDINE 20 MILLIGRAM(S): 10 INJECTION INTRAVENOUS at 04:37

## 2020-02-21 RX ADMIN — Medication 30 MILLIGRAM(S): at 04:37

## 2020-02-21 RX ADMIN — ONDANSETRON 4 MILLIGRAM(S): 8 TABLET, FILM COATED ORAL at 04:37

## 2020-02-21 NOTE — ED ADULT NURSE NOTE - OBJECTIVE STATEMENT
Presents to ED for abdominal pain with vomiting and diarrhea which began at 3am this morning.  Patient reports sudden onset of symptoms.  Was not feeling well earlier in the day but denies fevers, chills, blood in stool, urinary symptoms.  States abdominal pain is in Upper Left quadrant.

## 2020-02-21 NOTE — ED PROVIDER NOTE - PROGRESS NOTE DETAILS
pt feeling better, tolerating po.  recommend continued oral hydration   I have discussed the discharge plan with the patient. The patient agrees with the plan, as discussed.  The patient understands Emergency Department diagnosis is a preliminary diagnosis often based on limited information and that the patient must adhere to the follow-up plan as discussed.  The patient understands that if the symptoms worsen or if prescribed medications do not have the desired/planned effect that the patient may return to the Emergency Department at any time for further evaluation and treatment.

## 2020-02-21 NOTE — ED PROVIDER NOTE - CLINICAL SUMMARY MEDICAL DECISION MAKING FREE TEXT BOX
n/v/d, no guarding, no rebound on exam, no evidence of surgical abd at this time, possibly viral in origin  -check labs, ivf  -zofran, pepcid, toradol

## 2020-02-21 NOTE — ED PROVIDER NOTE - NSFOLLOWUPINSTRUCTIONS_ED_ALL_ED_FT
Acute Nausea and Vomiting    WHAT YOU NEED TO KNOW:    Acute nausea and vomiting start suddenly, worsen quickly, and last a short time.    DISCHARGE INSTRUCTIONS:    Return to the emergency department if:     You see blood in your vomit or your bowel movements.      You have sudden, severe pain in your chest and upper abdomen after hard vomiting or retching.      You have swelling in your neck and chest.       You are dizzy, cold, and thirsty and your eyes and mouth are dry.      You are urinating very little or not at all.      You have muscle weakness, leg cramps, and trouble breathing.       Your heart is beating much faster than normal.       You continue to vomit for more than 48 hours.     Contact your healthcare provider if:     You have frequent dry heaves (vomiting but nothing comes out).      Your nausea and vomiting does not get better or go away after you use medicine.      You have questions or concerns about your condition or treatment.    Medicines: You may need any of the following:     Medicines may be given to calm your stomach and stop your vomiting. You may also need medicines to help you feel more relaxed or to stop nausea and vomiting caused by motion sickness.      Gastrointestinal stimulants are used to help empty your stomach and bowels. This may help decrease nausea and vomiting.      Take your medicine as directed. Contact your healthcare provider if you think your medicine is not helping or if you have side effects. Tell him or her if you are allergic to any medicine. Keep a list of the medicines, vitamins, and herbs you take. Include the amounts, and when and why you take them. Bring the list or the pill bottles to follow-up visits. Carry your medicine list with you in case of an emergency.    Prevent or manage acute nausea and vomiting:     Do not drink alcohol. Alcohol may upset or irritate your stomach. Too much alcohol can also cause acute nausea and vomiting.      Control stress. Headaches due to stress may cause nausea and vomiting. Find ways to relax and manage your stress. Get more rest and sleep.      Drink more liquids as directed. Vomiting can lead to dehydration. It is important to drink more liquids to help replace lost body fluids. Ask your healthcare provider how much liquid to drink each day and which liquids are best for you. Your provider may recommend that you drink an oral rehydration solution (ORS). ORS contains water, salts, and sugar that are needed to replace the lost body fluids. Ask what kind of ORS to use, how much to drink, and where to get it.      Eat smaller meals, more often. Eat small amounts of food every 2 to 3 hours, even if you are not hungry. Food in your stomach may decrease your nausea.      Talk to your healthcare provider before you take over-the-counter (OTC) medicines. These medicines can cause serious problems if you use certain other medicines, or you have a medical condition. You may have problems if you use too much or use them for longer than the label says. Follow directions on the label carefully.     Follow up with your healthcare provider as directed: Write down your questions so you remember to ask them during your follow-up visits.    Acute Diarrhea    WHAT YOU NEED TO KNOW:    Acute diarrhea starts quickly and lasts a short time, usually 1 to 3 days. It can last up to 2 weeks. You may not be able to control your diarrhea. Acute diarrhea usually stops on its own.     DISCHARGE INSTRUCTIONS:    Return to the emergency department if:     You feel confused.     Your heartbeat is faster than usual.     Your eyes look deeply sunken, or you have no tears when you cry.     You urinate less than usual, or your urine is dark yellow.     You have blood or mucus in your bowel movements.    You have severe abdominal pain.     You are unable to drink any liquids.     Contact your healthcare provider if:     Your symptoms do not get better with treatment.     You have a fever higher than 101.3°F (38.5°C).     You have trouble eating and drinking because you are vomiting.     Your diarrhea does not get better in 7 days.     You have questions or concerns about your condition or care.     Follow up with your healthcare provider as directed: Write down your questions so you remember to ask them during your visits.     Medicines:    Diarrhea medicine is an over-the-counter medicine that helps slow or stop your diarrhea. Do not take this medicine unless your healthcare provider says it is okay.     Antibiotics may be given to help treat an infection caused by bacteria.     Antiparasitics may be given to treat an infection caused by parasites.     Take your medicine as directed. Contact your healthcare provider if you think your medicine is not helping or if you have side effects. Tell him of her if you are allergic to any medicine. Keep a list of the medicines, vitamins, and herbs you take. Include the amounts, and when and why you take them. Bring the list or the pill bottles to follow-up visits. Carry your medicine list with you in case of an emergency.    Self-care:     Drink liquids as directed. Liquids will help prevent dehydration caused by diarrhea. Ask your healthcare provider how much liquid to drink each day and which liquids are best for you. You may need to drink an oral rehydration solution (ORS). An ORS has the right amounts of water, salts, and sugar you need to replace body fluids. You can buy an ORS at most grocery stores and pharmacies.     Eat foods that are easy to digest. Examples include rice, lentils, cereal, bananas, potatoes, and bread. It also includes some fruits (bananas, melon), well-cooked vegetables, and lean meats. Do not eat foods high in fiber, fat, and sugar. Do not drink alcohol until your diarrhea is gone.     Prevent acute diarrhea:     Wash your hands often. Use soap and water. Wash your hands before you eat or prepare food. Also wash your hands after you use the bathroom. Use an alcohol-based hand gel when soap and water are not available.     Keep bathroom surfaces clean. This helps prevent the spread of germs that cause acute diarrhea.     Wash fruits and vegetables well before you eat them. This can help remove germs that cause diarrhea. If possible, remove the skin from fruits and vegetables, or cook them well before you eat them.     Cook meat and poultry as directed. Meat includes beef and pork. Poultry includes chicken, turkey, and duck.  Cook ground meat to 160°F.     Cook ground poultry, whole poultry, or cuts of poultry to at least 165°F. Remove the poultry from heat. Let it stand for 3 minutes before you eat it.     Cook whole cuts of meat other than poultry to at least 145°F. Remove the meat from heat. Let it stand for 3 minutes before you eat it.     Wash dishes that have touched raw meat or poultry with hot water and soap. This includes cutting boards, utensils, dishes, and serving containers.     Place raw or cooked meat or poultry in the refrigerator as soon as possible. Bacteria can grow in meat or poultry that is left at room temperature too long.     Do not eat raw or undercooked oysters, clams, or mussels. These foods may be contaminated and cause infection.     Drink only filtered or treated water when you travel. Do not put ice in your drinks. Drink bottled water whenever possible.

## 2020-02-21 NOTE — ED PROVIDER NOTE - PATIENT PORTAL LINK FT
You can access the FollowMyHealth Patient Portal offered by [facility] by registering at the following website: [email or web address]. By joining [employer]’s FollowMyHealth portal, you will also be able to view your health information using other applications (apps) compatible with our system.

## 2020-02-21 NOTE — ED PROVIDER NOTE - OBJECTIVE STATEMENT
29F hx asthma, thalassemia, c/o n/v/d. pt states was feeling unwell throughout the day, states felt chills, bodyaches.  states then tonight around 3AM she started having loose watery diarrhea and vomiting. some LUQ abd pain. no recent travel. states mom sick with n/v/d. no HA. no rash.

## 2020-02-21 NOTE — ED ADULT NURSE NOTE - NSIMPLEMENTINTERV_GEN_ALL_ED
Implemented All Universal Safety Interventions:  [place] to call system. Call bell, personal items and telephone within reach. Instruct patient to call for assistance. Room bathroom lighting operational. Non-slip footwear when patient is off stretcher. Physically safe environment: no spills, clutter or unnecessary equipment. Stretcher in lowest position, wheels locked, appropriate side rails in place.

## 2020-02-21 NOTE — ED ADULT NURSE NOTE - CHPI ED NUR SYMPTOMS NEG
no dysuria/no abdominal distension/no burning urination/no blood in stool/no chills/no hematuria/no fever

## 2020-02-25 DIAGNOSIS — R19.7 DIARRHEA, UNSPECIFIED: ICD-10-CM

## 2020-02-25 DIAGNOSIS — R10.9 UNSPECIFIED ABDOMINAL PAIN: ICD-10-CM

## 2020-02-25 DIAGNOSIS — R68.83 CHILLS (WITHOUT FEVER): ICD-10-CM

## 2020-02-25 DIAGNOSIS — R11.2 NAUSEA WITH VOMITING, UNSPECIFIED: ICD-10-CM

## 2020-02-25 DIAGNOSIS — R10.12 LEFT UPPER QUADRANT PAIN: ICD-10-CM

## 2020-08-20 ENCOUNTER — EMERGENCY (EMERGENCY)
Facility: HOSPITAL | Age: 30
LOS: 1 days | Discharge: HOME | End: 2020-08-20
Attending: EMERGENCY MEDICINE | Admitting: EMERGENCY MEDICINE
Payer: MEDICAID

## 2020-08-20 VITALS
HEART RATE: 87 BPM | TEMPERATURE: 98 F | RESPIRATION RATE: 16 BRPM | OXYGEN SATURATION: 97 % | SYSTOLIC BLOOD PRESSURE: 112 MMHG | DIASTOLIC BLOOD PRESSURE: 79 MMHG

## 2020-08-20 VITALS
RESPIRATION RATE: 18 BRPM | TEMPERATURE: 98 F | HEART RATE: 82 BPM | SYSTOLIC BLOOD PRESSURE: 116 MMHG | DIASTOLIC BLOOD PRESSURE: 82 MMHG | OXYGEN SATURATION: 98 %

## 2020-08-20 DIAGNOSIS — Z98.891 HISTORY OF UTERINE SCAR FROM PREVIOUS SURGERY: Chronic | ICD-10-CM

## 2020-08-20 LAB
ALBUMIN SERPL ELPH-MCNC: 4.4 G/DL — SIGNIFICANT CHANGE UP (ref 3.3–5)
ALP SERPL-CCNC: 136 U/L — HIGH (ref 40–120)
ALT FLD-CCNC: 324 U/L — HIGH (ref 10–45)
ANION GAP SERPL CALC-SCNC: 12 MMOL/L — SIGNIFICANT CHANGE UP (ref 5–17)
APPEARANCE UR: ABNORMAL
AST SERPL-CCNC: 152 U/L — HIGH (ref 10–40)
BASOPHILS # BLD AUTO: 0.19 K/UL — SIGNIFICANT CHANGE UP (ref 0–0.2)
BASOPHILS NFR BLD AUTO: 2.7 % — HIGH (ref 0–2)
BILIRUB SERPL-MCNC: 0.6 MG/DL — SIGNIFICANT CHANGE UP (ref 0.2–1.2)
BILIRUB UR-MCNC: ABNORMAL
BUN SERPL-MCNC: 11 MG/DL — SIGNIFICANT CHANGE UP (ref 7–23)
CALCIUM SERPL-MCNC: 9.1 MG/DL — SIGNIFICANT CHANGE UP (ref 8.4–10.5)
CHLORIDE SERPL-SCNC: 103 MMOL/L — SIGNIFICANT CHANGE UP (ref 96–108)
CO2 SERPL-SCNC: 24 MMOL/L — SIGNIFICANT CHANGE UP (ref 22–31)
COLOR SPEC: YELLOW — SIGNIFICANT CHANGE UP
CREAT SERPL-MCNC: 0.73 MG/DL — SIGNIFICANT CHANGE UP (ref 0.5–1.3)
DIFF PNL FLD: ABNORMAL
EOSINOPHIL # BLD AUTO: 0.44 K/UL — SIGNIFICANT CHANGE UP (ref 0–0.5)
EOSINOPHIL NFR BLD AUTO: 6.3 % — HIGH (ref 0–6)
GLUCOSE SERPL-MCNC: 79 MG/DL — SIGNIFICANT CHANGE UP (ref 70–99)
GLUCOSE UR QL: NEGATIVE — SIGNIFICANT CHANGE UP
HCT VFR BLD CALC: 36.9 % — SIGNIFICANT CHANGE UP (ref 34.5–45)
HGB BLD-MCNC: 11 G/DL — LOW (ref 11.5–15.5)
KETONES UR-MCNC: ABNORMAL MG/DL
LEUKOCYTE ESTERASE UR-ACNC: NEGATIVE — SIGNIFICANT CHANGE UP
LIDOCAIN IGE QN: 33 U/L — SIGNIFICANT CHANGE UP (ref 7–60)
LYMPHOCYTES # BLD AUTO: 1.65 K/UL — SIGNIFICANT CHANGE UP (ref 1–3.3)
LYMPHOCYTES # BLD AUTO: 23.4 % — SIGNIFICANT CHANGE UP (ref 13–44)
MCHC RBC-ENTMCNC: 20.7 PG — LOW (ref 27–34)
MCHC RBC-ENTMCNC: 29.8 GM/DL — LOW (ref 32–36)
MCV RBC AUTO: 69.4 FL — LOW (ref 80–100)
MONOCYTES # BLD AUTO: 0.57 K/UL — SIGNIFICANT CHANGE UP (ref 0–0.9)
MONOCYTES NFR BLD AUTO: 8.1 % — SIGNIFICANT CHANGE UP (ref 2–14)
NEUTROPHILS # BLD AUTO: 3.68 K/UL — SIGNIFICANT CHANGE UP (ref 1.8–7.4)
NEUTROPHILS NFR BLD AUTO: 52.3 % — SIGNIFICANT CHANGE UP (ref 43–77)
NITRITE UR-MCNC: NEGATIVE — SIGNIFICANT CHANGE UP
PH UR: 5.5 — SIGNIFICANT CHANGE UP (ref 5–8)
PLATELET # BLD AUTO: 344 K/UL — SIGNIFICANT CHANGE UP (ref 150–400)
POTASSIUM SERPL-MCNC: 4.1 MMOL/L — SIGNIFICANT CHANGE UP (ref 3.5–5.3)
POTASSIUM SERPL-SCNC: 4.1 MMOL/L — SIGNIFICANT CHANGE UP (ref 3.5–5.3)
PROT SERPL-MCNC: 7.9 G/DL — SIGNIFICANT CHANGE UP (ref 6–8.3)
PROT UR-MCNC: NEGATIVE MG/DL — SIGNIFICANT CHANGE UP
RBC # BLD: 5.32 M/UL — HIGH (ref 3.8–5.2)
RBC # FLD: 15.3 % — HIGH (ref 10.3–14.5)
SODIUM SERPL-SCNC: 139 MMOL/L — SIGNIFICANT CHANGE UP (ref 135–145)
SP GR SPEC: >=1.03 — SIGNIFICANT CHANGE UP (ref 1–1.03)
UROBILINOGEN FLD QL: 0.2 E.U./DL — SIGNIFICANT CHANGE UP
WBC # BLD: 7.03 K/UL — SIGNIFICANT CHANGE UP (ref 3.8–10.5)
WBC # FLD AUTO: 7.03 K/UL — SIGNIFICANT CHANGE UP (ref 3.8–10.5)

## 2020-08-20 PROCEDURE — 81001 URINALYSIS AUTO W/SCOPE: CPT

## 2020-08-20 PROCEDURE — 83690 ASSAY OF LIPASE: CPT

## 2020-08-20 PROCEDURE — 76705 ECHO EXAM OF ABDOMEN: CPT

## 2020-08-20 PROCEDURE — 99284 EMERGENCY DEPT VISIT MOD MDM: CPT | Mod: 25

## 2020-08-20 PROCEDURE — 80053 COMPREHEN METABOLIC PANEL: CPT

## 2020-08-20 PROCEDURE — 99285 EMERGENCY DEPT VISIT HI MDM: CPT

## 2020-08-20 PROCEDURE — 36415 COLL VENOUS BLD VENIPUNCTURE: CPT

## 2020-08-20 PROCEDURE — 85025 COMPLETE CBC W/AUTO DIFF WBC: CPT

## 2020-08-20 PROCEDURE — 76705 ECHO EXAM OF ABDOMEN: CPT | Mod: 26

## 2020-08-20 RX ORDER — SODIUM CHLORIDE 9 MG/ML
1000 INJECTION INTRAMUSCULAR; INTRAVENOUS; SUBCUTANEOUS ONCE
Refills: 0 | Status: COMPLETED | OUTPATIENT
Start: 2020-08-20 | End: 2020-08-20

## 2020-08-20 RX ADMIN — SODIUM CHLORIDE 1000 MILLILITER(S): 9 INJECTION INTRAMUSCULAR; INTRAVENOUS; SUBCUTANEOUS at 17:13

## 2020-08-20 NOTE — ED ADULT NURSE NOTE - CHPI ED NUR SYMPTOMS NEG
no abdominal distension/no burning urination/no chills/no dysuria/no diarrhea/no fever/no nausea/no blood in stool/no hematuria/no vomiting

## 2020-08-20 NOTE — ED PROVIDER NOTE - OBJECTIVE STATEMENT
Pt w/ PMHx asthma, PSHx c/s x 4, referred to the ED for abnormal LFTs. Pt went to US yesterday for intermittent epigastric pain x 3 days. The pain is worse after eating, radiates to the RUQ.  No pain on exam. No associated n/v/d/c. No pale stools. No f/c. Pt was called today, advised of elevated LFTs, and to come to the ED for US. + IUD, amenorrhea. Pt does not think she is pregnant. No lower abd pain, vag bleeding, nor discharge. Pt w/ prior hx gallstones and admits to fatty, greasy diet.

## 2020-08-20 NOTE — ED PROVIDER NOTE - NSFOLLOWUPINSTRUCTIONS_ED_ALL_ED_FT
You were evaluated in the ED for having abnormal labs with abdominal pain in the outpatient setting. You remained without pain, and your blood work was improved compared to the outpatient blood work. You had an ultrasound showing gallstones without infection or inflammation of the gallbladder. You common bile duct was within normal limits, although close to the upper limit of normal. You likely passed a gall stone. You should follow up both with a gastroenterologist and a general surgeon. A gastroenterologist may send you for a type of MRI called MRCP. A general surgeon to follow up with for elective removal of the gallbladder.    PLEASE ELIMINATE GREASY, FATTY FOODS FROM YOUR DIET. IF YOU WILL CONTINUE TO EAT THESE TYPES OF FOODS, YOU WILL CONTINUE TO HAVE GALLBLADDER ATTACKS. RETURN TO THE ED FOR ACUTELY WORSENING PAIN, FEVER, VOMITING, YELLOWING OF THE SKIN, OR OTHER CONCERNING SYMPTOMS.     Gallstones    Gallstones (cholelithiasis) is a form of gallbladder disease in which stones form in your gallbladder. The gallbladder is an organ that stores bile made in the liver, which helps digest fats. Gallstones begin as small bile crystals and slowly grow into stones. Gallstone pain occurs when the gallbladder spasms and a gallstone or sludge is blocking the duct. Pain can also occur when a stone passes out of the duct. Only take over-the-counter or prescription medicines for pain, discomfort, or fever as directed by your health care provider. Follow a low-fat diet until seen again by your health care provider.     SEEK IMMEDIATE MEDICAL CARE IF YOU HAVE ANY OF THE FOLLOWING SYMPTOMS: worsening pain, fever, persistent vomiting, yellowing of the skin or eyes, or altered mental status.

## 2020-08-20 NOTE — ED PROVIDER NOTE - CARE PROVIDER_API CALL
Darrius Andrews  GASTROENTEROLOGY  [address], [address]  [place] [address]  Phone: [phone]  Fax: [phone]  Follow Up Time:     Ryan Kevin)  Medicine  [address], [address]  [place] [address]  Phone: [phone]  Fax: [phone]  Follow Up Time:     Mario Lane  SURGERY  [address], [place] [address]  Phone: [phone]  Fax: [phone]  Follow Up Time:     David De La Garza)  Surgery  [address], [address] [place] [address]  Phone: [phone]  Fax: [phone]  Follow Up Time:

## 2020-08-20 NOTE — ED PROVIDER NOTE - CLINICAL SUMMARY MEDICAL DECISION MAKING FREE TEXT BOX
Pt referred to the ED for elevated LFTs in the setting of upper abd pain, although pain free at this time and no abd ttp. Pt shows results, mildly elevated AST/ALT/Bili, obstructive pattern. DDx includes but not limited to choledocholithiasis, cholecystitis, biliary colic, passed biliary stone, gallstone pancreatitis, less likely hepatitis, fatty liver, other pathology. Check labs, RUQ sono, IVF. Dispo pending w/u and clinical status

## 2020-08-20 NOTE — ED ADULT NURSE NOTE - NSIMPLEMENTINTERV_GEN_ALL_ED
Implemented All Universal Safety Interventions:  [place] to call system. Call bell, personal items and telephone within reach. Instruct patient to call for assistance. Room bathroom lighting operational. Non-slip footwear when patient is off stretcher. Physically safe environment: no spills, clutter or unnecessary equipment. Stretcher in lowest position, wheels locked, appropriate side rails in place.

## 2020-08-20 NOTE — ED PROVIDER NOTE - PHYSICAL EXAMINATION
Limited PE performed in the setting of the COVID10 pandemic, in efforts to limit exposure and cross-contamination  Constitutional: Well appearing, well nourished, awake, alert, oriented to person, place, time/situation and in no apparent distress.  ENMT: Airway patent.   Eyes: Clear bilaterally  Cardiac: Normal rate, regular rhythm.   Respiratory: No increased WOB, tachypnea, hypoxia, or accessory mm use. Pt speaks in full sentences.   Gastrointestinal: Abd soft, NT, ND. No guarding, rebound, or rigidity. No Christian's sign  Musculoskeletal: Range of motion is not limited  Neuro: Alert and oriented x 3, face symmetric and speech fluent. Nml gross motor movement, grossly non focal   Skin: Skin normal color for race, warm, dry and intact. No evidence of rash.  Psych: Alert and oriented to person, place, time/situation. normal mood and affect. no apparent risk to self or others.

## 2020-08-20 NOTE — ED ADULT NURSE NOTE - OBJECTIVE STATEMENT
Patient states has Hx of gallbladder stones, c/o of midabdominal pain X 2 days, no nausea/vomitting/diarrhea/dysuria, last BM normal yesterday, feeling gassy and bloated.

## 2020-08-20 NOTE — ED ADULT NURSE REASSESSMENT NOTE - NS ED NURSE REASSESS COMMENT FT1
Patient a/ox 3, abdominal pain improved, food and fluids PO tolerated well.  All tests, labs and US resulted.  Vital signs stable.  Discharged to home in stble condition to follow up w/ GI.

## 2020-08-20 NOTE — ED PROVIDER NOTE - PROVIDER TOKENS
PROVIDER:[TOKEN:[[id number]]],PROVIDER:[TOKEN:[[id number]]],PROVIDER:[TOKEN:[[id number]]],PROVIDER:[TOKEN:[[id number]]]

## 2020-08-20 NOTE — ED PROVIDER NOTE - PATIENT PORTAL LINK FT
You can access the FollowMyHealth Patient Portal offered by [facility] by registering at the following website: [email or web address]. By joining [employer]s FollowMyHealth portal, you will also be able to view your health information using other applications (apps) compatible with our system.

## 2020-08-20 NOTE — ED PROVIDER NOTE - PROGRESS NOTE DETAILS
Pt remains pain-free. LFTs in comparison to outpt improved (previously also had elevated bili and lipase). Will d/c w/ GI and surgery f/u. likely passed stone. Given dietary instructions and strict return precautions to return for recurrent / severe pain, n/v, f/c.

## 2020-08-20 NOTE — ED PROVIDER NOTE - CARE PROVIDERS DIRECT ADDRESSES
[email or web address],DirectAddress_Unknown

## 2020-08-24 DIAGNOSIS — R10.13 EPIGASTRIC PAIN: ICD-10-CM

## 2020-08-24 DIAGNOSIS — K80.50 CALCULUS OF BILE DUCT WITHOUT CHOLANGITIS OR CHOLECYSTITIS WITHOUT OBSTRUCTION: ICD-10-CM

## 2020-10-06 ENCOUNTER — APPOINTMENT (OUTPATIENT)
Dept: SURGERY | Facility: CLINIC | Age: 30
End: 2020-10-06

## 2020-10-19 ENCOUNTER — APPOINTMENT (OUTPATIENT)
Dept: SURGERY | Facility: CLINIC | Age: 30
End: 2020-10-19
Payer: MEDICAID

## 2020-10-19 VITALS
BODY MASS INDEX: 29.06 KG/M2 | HEART RATE: 84 BPM | SYSTOLIC BLOOD PRESSURE: 110 MMHG | HEIGHT: 60 IN | TEMPERATURE: 97.2 F | DIASTOLIC BLOOD PRESSURE: 75 MMHG | WEIGHT: 148 LBS | OXYGEN SATURATION: 97 %

## 2020-10-19 DIAGNOSIS — Z82.49 FAMILY HISTORY OF ISCHEMIC HEART DISEASE AND OTHER DISEASES OF THE CIRCULATORY SYSTEM: ICD-10-CM

## 2020-10-19 DIAGNOSIS — Z87.39 PERSONAL HISTORY OF OTHER DISEASES OF THE MUSCULOSKELETAL SYSTEM AND CONNECTIVE TISSUE: ICD-10-CM

## 2020-10-19 DIAGNOSIS — Z87.09 PERSONAL HISTORY OF OTHER DISEASES OF THE RESPIRATORY SYSTEM: ICD-10-CM

## 2020-10-19 DIAGNOSIS — Z86.2 PERSONAL HISTORY OF DISEASES OF THE BLOOD AND BLOOD-FORMING ORGANS AND CERTAIN DISORDERS INVOLVING THE IMMUNE MECHANISM: ICD-10-CM

## 2020-10-19 DIAGNOSIS — K80.10 CALCULUS OF GALLBLADDER WITH CHRONIC CHOLECYSTITIS W/OUT OBSTRUCTION: ICD-10-CM

## 2020-10-19 DIAGNOSIS — Z82.5 FAMILY HISTORY OF ASTHMA AND OTHER CHRONIC LOWER RESPIRATORY DISEASES: ICD-10-CM

## 2020-10-19 DIAGNOSIS — Z83.3 FAMILY HISTORY OF DIABETES MELLITUS: ICD-10-CM

## 2020-10-19 DIAGNOSIS — R79.89 OTHER SPECIFIED ABNORMAL FINDINGS OF BLOOD CHEMISTRY: ICD-10-CM

## 2020-10-19 DIAGNOSIS — K83.8 OTHER SPECIFIED DISEASES OF BILIARY TRACT: ICD-10-CM

## 2020-10-19 PROCEDURE — 99072 ADDL SUPL MATRL&STAF TM PHE: CPT

## 2020-10-19 PROCEDURE — 99204 OFFICE O/P NEW MOD 45 MIN: CPT

## 2020-10-19 NOTE — DATA REVIEWED
[FreeTextEntry1] : US abdomen (7/14/2019) - cholelithiasis without evidence for acute cholecystitis.  Common bile duct is upper normal caliber. Further evaluation with MRCP can be obtained if there is clinical/laboratory evidence for biliary obstruction.\par \par US abdomen (7/22/2019) - cholelithiasis with one tiny non mobile gallstone seen at neck of gallbladder. No wall thickening, luminal dilation, or surrounding inflammation to indicate cholecystitis.  Common bile duct measures upper limit of normal. No intrahepatic bile duct dilatation. Consider MRCP for further evaluation if there is concern for an obstructive biliary process.\par \par US abdomen (8/20/2020) - cholelithiasis without cholecystitis and an upper-limit-of-normal diameter CBD of 0.6 cm.\par \par Labs (8/20/2020) - elevated liver function tests (, , alkaline phosphatase 136), a normal total bilirubin 0.6, a normal lipase of 33, and a normal WBC count of 7.03 without a left shift (52.3% neutrophils).

## 2020-10-19 NOTE — CONSULT LETTER
[FreeTextEntry1] : 2020\par \par \par Josue Flood M.D.\par [address]\[place] [address]\par Telephone #: [phone]\par \par \par Re: Ciara Teixeira\par : 1990\par \par \par Dear Dr. Flood:\par \par I had the opportunity to see Ms. Teixeira today after she was seen in the Health system emergency department on 2020, after she was found to have elevated LFTs on outpatient blood work.  At the time of her emergency department visit, her alkaline phosphatase, AST, and ALT were noted to be elevated and she underwent a right upper quadrant ultrasound, which demonstrated cholelithiasis without evidence of cholecystitis and an upper-limit-of-normal diameter common bile duct.  She stated she has been having right upper quadrant pain that began approximately 1 year ago.  She stated she has been having intermittent episodes over the past year, with worsening of the pain with eating.  She stated Tums had minimal effect on the pain.  She also noted one episode of left lower quadrant pain associated with hematuria approximately 1 month ago, which resolved after she took some Advil, and she believes may be related to kidney stones.\par \par On physical examination, her height is 5 feet, her weight is 148 pounds, and BMI 28.9.  Her temperature is 97.2 °F, blood pressure is 110/75, heart rate 84, and O2 saturation is 97% on room air. In general, she is a well-dressed, well-nourished woman who appears her stated age and is in no acute distress.  She is alert and oriented x 3 and calm.  HEENT exam demonstrates no scleral icterus and a normocephalic atraumatic appearance.  Heart sounds S1 S2 are normal with a regular rate and rhythm.  Lungs are clear to auscultation bilaterally.  Her abdomen has audible bowel sounds, is soft, non-tender, and non-distended.  There is no hepatosplenomegaly.  Her extremities are warm and dry without clubbing, cyanosis or edema. \par \par I reviewed the ultrasound of the abdomen that was performed on 2019, which demonstrated cholelithiasis without evidence for acute cholecystitis.  Common bile duct is upper normal caliber. Further evaluation with MRCP can be obtained if there is clinical/laboratory evidence for biliary obstruction.\par \par I reviewed the ultrasound of the abdomen that was performed on 2019, which demonstrated cholelithiasis with one tiny non mobile gallstone seen at neck of gallbladder. No wall thickening, luminal dilation, or surrounding inflammation to indicate cholecystitis.  Common bile duct measures upper limit of normal. No intrahepatic bile duct dilatation. Consider MRCP for further evaluation if there is concern for an obstructive biliary process.\par \par I reviewed the ultrasound of the abdomen that was performed on 2020, which demonstrated cholelithiasis without cholecystitis and an upper-limit-of-normal diameter CBD of 0.6 cm.\par \par I reviewed the blood work that was performed in the emergency department on 2020, which demonstrated elevated liver function tests (, , alkaline phosphatase 136), a normal total bilirubin 0.6, a normal lipase of 33, and a normal WBC count of 7.03 without a left shift (52.3% neutrophils).\par \par In summary, Ms. Teixeira is a 29-year-old woman with cholelithiasis and a borderline dilated common bile duct on ultrasound with an elevation of the alkaline phosphatase, AST, and ALT.  We will perform an MRCP to ensure there is no evidence of choledocholithiasis (with an ERCP pre-operatively if there is evidence of choledocholithiasis) and then plan for a robotic-assisted cholecystectomy following the MRCP.\par \par Thank you for the opportunity to care for this patient. Please do not hesitate to contact me in the event that you have any questions or concerns about the care of this patient.\par \par Sincerely,\par \par \par \par Paula Ramirez M.D.

## 2020-10-19 NOTE — PHYSICAL EXAM
[Calm] : calm [de-identified] : NAD, comfortable [de-identified] : NCAT, no scleral icterus [de-identified] : Supple, no JVD or cervical lymphadenopathy [de-identified] : CTAB [de-identified] : +BS soft NT ND.  No hepatosplenomegaly.  Well-healed Pfannenstiel incision from prior C-sections without evidence of any incisional hernias with Valsalva maneuver. [de-identified] : S1S2 normal, RRR [de-identified] : No clubbing, cyanosis, or edema. [de-identified] : Warm, dry. [de-identified] : A&Ox3

## 2020-10-19 NOTE — REVIEW OF SYSTEMS
[Feeling Tired] : feeling tired [Abdominal Pain] : abdominal pain [Negative] : Heme/Lymph [Fever] : no fever [Chills] : no chills [Feeling Poorly] : not feeling poorly [Recent Weight Gain (___ Lbs)] : no recent weight gain [Recent Weight Loss (___ Lbs)] : no recent weight loss [Vomiting] : no vomiting [Constipation] : no constipation [Diarrhea] : no diarrhea [Heartburn] : no heartburn [Melena] : no melena [Incontinence] : no incontinence [Dysuria] : no dysuria [Pelvic Pain] : no pelvic pain [Dysmenorrhea] : no dysmenorrhea [Vaginal Discharge] : no vaginal discharge [Abn Vaginal Bleeding] : no unexplained vaginal bleeding [FreeTextEntry8] : hematuria

## 2020-10-19 NOTE — ASSESSMENT
[FreeTextEntry1] : Ms. Teixeira is a 29-year-old woman with cholelithiasis and a borderline dilated common bile duct on ultrasound with an elevation of the alkaline phosphatase, AST, and ALT.  We will perform an MRCP to ensure there is no evidence of choledocholithiasis (with an ERCP pre-operatively if there is evidence of choledocholithiasis) and then plan for a robotic-assisted cholecystectomy following the MRCP.

## 2020-10-19 NOTE — HISTORY OF PRESENT ILLNESS
[de-identified] : Ms. Teixeira presented today after she was seen in the [facility] emergency department on August 20, 2020, after she was found to have elevated LFTs on outpatient blood work.  At the time of her emergency department visit, her alkaline phosphatase, AST, and ALT were noted to be elevated and she underwent a right upper quadrant ultrasound, which demonstrated cholelithiasis without evidence of cholecystitis and an upper-limit-of-normal diameter common bile duct.  She stated she has been having right upper quadrant pain that began approximately 1 year ago.  She stated she has been having intermittent episodes over the past year, with worsening of the pain with eating.  She stated Tums had minimal effect on the pain.  She also noted one episode of left lower quadrant pain associated with hematuria approximately 1 month ago, which resolved after she took some Advil, and she believes may be related to kidney stones.

## 2021-01-01 NOTE — ED ADULT NURSE NOTE - CAS DISCH ACCOMP BY
Nursing notes reviewed and accepted.    Adair Posada is a 2 week old male who presents for well child exam.  Patient presents with Mother.    Concerns raised today include: mom just has few questions about baby acne, breast feeding and baby stooling patterns/color.     Umbilical stump: clean, hernia noted.   Urinary stream is strong.  Feeding: breast fed every 20 min to 2 hours (has been cluster feeding)  Sleeping: on back  Elimination:  Normal wet diapers and bowel movements.    SOCIAL:  Primary caretakers: mom and dad.   : none    DEVELOPMENT:  responds to bright light, responds to voice, moves arms and legs equally, raises head slightly when prone and cries to display discomfort    Birth history, medical history, surgical history, and family history reviewed and updated.    PHYSICAL EXAM:  Temperature 98.4 °F (36.9 °C), temperature source Axillary, height 20.75\" (52.7 cm), weight 3.9 kg, head circumference 37 cm (14.57\").  GENERAL:  Well appearing male infant, nontoxic, no acute distress.  Alert and     interactive.  SKIN: Warm, normal turgor. No cyanosis. No bruises or lesions.  HEAD:  Normocephalic, atraumatic. Anterior fontanel open, soft and flat.  EYES: Conjunctivae appear normal with neither icterus nor subconjunctival hemorrhage. Pupils equal, round, reactive to light; extraocular movements intact, positive red reflex bilaterally.  NOSE:  Appears normal, no flaring.  EARS:  Normal pinnae, no preauricular skin tags or pit.    THROAT:  Oropharynx with moist mucous membranes and no lesions.  NECK:  Supple, no lymphadenopathy or masses.  HEART:  Regular rate and rhythm.  Quiet precordium.  Normal S1, S2.  No murmurs, rubs, gallops. Equal femoral pulses.  LUNGS:  Clear to auscultation bilaterally.  No wheezes, rales, rhonchi.  Normal work of breathing.  ABDOMEN:  Umbilical stump is normal and umbilical hernia noted.  Easily reducible.  Soft, nontender.  No organomegaly or masses.  GENITOURINARY:   testes descended bilaterally and circumcision healing well  MUSCULOSKELETAL:  Hips within normal range of motion.  Negative Hirsch, Ortolani.  Spine straight.  Normal sacrum.  EXTREMITIES:  Warm, dry, without abnormalities.  NEUROLOGIC:  Normal tone, bulk, strength.    ASSESSMENT:  2 week old male well infant. Growing well.  Small umbilical hernia noted. Some baby acne as well.     PLAN:  All parental concerns and questions discussed.  Anticipatory guidance provided, handout given.              Fever management              Sleep management              Sudden Infant Death Syndrome prevention              Accident prevention: car seat, crib, water heater temperature              Diet              Tobacco-free home  Immunizations at next visit.   Questions addressed.  Return at age 2 months for well exam and immunizations; as needed for illness/concerns.   Self

## 2021-01-10 ENCOUNTER — EMERGENCY (EMERGENCY)
Facility: HOSPITAL | Age: 31
LOS: 1 days | Discharge: HOME | End: 2021-01-10
Attending: EMERGENCY MEDICINE | Admitting: EMERGENCY MEDICINE
Payer: MEDICAID

## 2021-01-10 VITALS
HEIGHT: 60 IN | OXYGEN SATURATION: 100 % | SYSTOLIC BLOOD PRESSURE: 104 MMHG | WEIGHT: 162.92 LBS | TEMPERATURE: 98 F | DIASTOLIC BLOOD PRESSURE: 69 MMHG | HEART RATE: 78 BPM | RESPIRATION RATE: 18 BRPM

## 2021-01-10 VITALS
SYSTOLIC BLOOD PRESSURE: 110 MMHG | TEMPERATURE: 98 F | DIASTOLIC BLOOD PRESSURE: 77 MMHG | HEART RATE: 66 BPM | RESPIRATION RATE: 16 BRPM | OXYGEN SATURATION: 100 %

## 2021-01-10 DIAGNOSIS — K80.20 CALCULUS OF GALLBLADDER WITHOUT CHOLECYSTITIS WITHOUT OBSTRUCTION: ICD-10-CM

## 2021-01-10 DIAGNOSIS — Z20.822 CONTACT WITH AND (SUSPECTED) EXPOSURE TO COVID-19: ICD-10-CM

## 2021-01-10 DIAGNOSIS — R10.13 EPIGASTRIC PAIN: ICD-10-CM

## 2021-01-10 DIAGNOSIS — Z98.891 HISTORY OF UTERINE SCAR FROM PREVIOUS SURGERY: Chronic | ICD-10-CM

## 2021-01-10 LAB
ALBUMIN SERPL ELPH-MCNC: 4.6 G/DL — SIGNIFICANT CHANGE UP (ref 3.3–5)
ALP SERPL-CCNC: 96 U/L — SIGNIFICANT CHANGE UP (ref 40–120)
ALT FLD-CCNC: 36 U/L — SIGNIFICANT CHANGE UP (ref 10–45)
ANION GAP SERPL CALC-SCNC: 10 MMOL/L — SIGNIFICANT CHANGE UP (ref 5–17)
ANISOCYTOSIS BLD QL: SLIGHT — SIGNIFICANT CHANGE UP
APPEARANCE UR: CLEAR — SIGNIFICANT CHANGE UP
AST SERPL-CCNC: 47 U/L — HIGH (ref 10–40)
BASOPHILS # BLD AUTO: 0 K/UL — SIGNIFICANT CHANGE UP (ref 0–0.2)
BASOPHILS NFR BLD AUTO: 0 % — SIGNIFICANT CHANGE UP (ref 0–2)
BILIRUB SERPL-MCNC: 0.6 MG/DL — SIGNIFICANT CHANGE UP (ref 0.2–1.2)
BILIRUB UR-MCNC: NEGATIVE — SIGNIFICANT CHANGE UP
BUN SERPL-MCNC: 9 MG/DL — SIGNIFICANT CHANGE UP (ref 7–23)
CALCIUM SERPL-MCNC: 8.9 MG/DL — SIGNIFICANT CHANGE UP (ref 8.4–10.5)
CHLORIDE SERPL-SCNC: 105 MMOL/L — SIGNIFICANT CHANGE UP (ref 96–108)
CO2 SERPL-SCNC: 24 MMOL/L — SIGNIFICANT CHANGE UP (ref 22–31)
COLOR SPEC: YELLOW — SIGNIFICANT CHANGE UP
CREAT SERPL-MCNC: 0.62 MG/DL — SIGNIFICANT CHANGE UP (ref 0.5–1.3)
DACRYOCYTES BLD QL SMEAR: SLIGHT — SIGNIFICANT CHANGE UP
DIFF PNL FLD: NEGATIVE — SIGNIFICANT CHANGE UP
ELLIPTOCYTES BLD QL SMEAR: SLIGHT — SIGNIFICANT CHANGE UP
EOSINOPHIL # BLD AUTO: 0.72 K/UL — HIGH (ref 0–0.5)
EOSINOPHIL NFR BLD AUTO: 7 % — HIGH (ref 0–6)
GLUCOSE SERPL-MCNC: 139 MG/DL — HIGH (ref 70–99)
GLUCOSE UR QL: NEGATIVE — SIGNIFICANT CHANGE UP
HCT VFR BLD CALC: 38 % — SIGNIFICANT CHANGE UP (ref 34.5–45)
HGB BLD-MCNC: 11.5 G/DL — SIGNIFICANT CHANGE UP (ref 11.5–15.5)
HYPOCHROMIA BLD QL: SLIGHT — SIGNIFICANT CHANGE UP
KETONES UR-MCNC: NEGATIVE — SIGNIFICANT CHANGE UP
LEUKOCYTE ESTERASE UR-ACNC: NEGATIVE — SIGNIFICANT CHANGE UP
LIDOCAIN IGE QN: 37 U/L — SIGNIFICANT CHANGE UP (ref 7–60)
LYMPHOCYTES # BLD AUTO: 1.54 K/UL — SIGNIFICANT CHANGE UP (ref 1–3.3)
LYMPHOCYTES # BLD AUTO: 14.9 % — SIGNIFICANT CHANGE UP (ref 13–44)
MANUAL SMEAR VERIFICATION: SIGNIFICANT CHANGE UP
MCHC RBC-ENTMCNC: 20.8 PG — LOW (ref 27–34)
MCHC RBC-ENTMCNC: 30.3 GM/DL — LOW (ref 32–36)
MCV RBC AUTO: 68.6 FL — LOW (ref 80–100)
MICROCYTES BLD QL: SIGNIFICANT CHANGE UP
MONOCYTES # BLD AUTO: 0.19 K/UL — SIGNIFICANT CHANGE UP (ref 0–0.9)
MONOCYTES NFR BLD AUTO: 1.8 % — LOW (ref 2–14)
NEUTROPHILS # BLD AUTO: 7.9 K/UL — HIGH (ref 1.8–7.4)
NEUTROPHILS NFR BLD AUTO: 76.3 % — SIGNIFICANT CHANGE UP (ref 43–77)
NITRITE UR-MCNC: NEGATIVE — SIGNIFICANT CHANGE UP
OVALOCYTES BLD QL SMEAR: SLIGHT — SIGNIFICANT CHANGE UP
PH UR: 6.5 — SIGNIFICANT CHANGE UP (ref 5–8)
PLAT MORPH BLD: NORMAL — SIGNIFICANT CHANGE UP
PLATELET # BLD AUTO: 321 K/UL — SIGNIFICANT CHANGE UP (ref 150–400)
POIKILOCYTOSIS BLD QL AUTO: SIGNIFICANT CHANGE UP
POLYCHROMASIA BLD QL SMEAR: SLIGHT — SIGNIFICANT CHANGE UP
POTASSIUM SERPL-MCNC: 4.1 MMOL/L — SIGNIFICANT CHANGE UP (ref 3.5–5.3)
POTASSIUM SERPL-SCNC: 4.1 MMOL/L — SIGNIFICANT CHANGE UP (ref 3.5–5.3)
PROT SERPL-MCNC: 7.9 G/DL — SIGNIFICANT CHANGE UP (ref 6–8.3)
PROT UR-MCNC: NEGATIVE MG/DL — SIGNIFICANT CHANGE UP
RBC # BLD: 5.54 M/UL — HIGH (ref 3.8–5.2)
RBC # FLD: 15.4 % — HIGH (ref 10.3–14.5)
RBC BLD AUTO: ABNORMAL
SARS-COV-2 RNA SPEC QL NAA+PROBE: SIGNIFICANT CHANGE UP
SCHISTOCYTES BLD QL AUTO: SLIGHT — SIGNIFICANT CHANGE UP
SMUDGE CELLS # BLD: PRESENT — SIGNIFICANT CHANGE UP
SODIUM SERPL-SCNC: 139 MMOL/L — SIGNIFICANT CHANGE UP (ref 135–145)
SP GR SPEC: 1.02 — SIGNIFICANT CHANGE UP (ref 1–1.03)
TARGETS BLD QL SMEAR: SLIGHT — SIGNIFICANT CHANGE UP
UROBILINOGEN FLD QL: 0.2 E.U./DL — SIGNIFICANT CHANGE UP
WBC # BLD: 10.35 K/UL — SIGNIFICANT CHANGE UP (ref 3.8–10.5)
WBC # FLD AUTO: 10.35 K/UL — SIGNIFICANT CHANGE UP (ref 3.8–10.5)

## 2021-01-10 PROCEDURE — 96374 THER/PROPH/DIAG INJ IV PUSH: CPT

## 2021-01-10 PROCEDURE — 99285 EMERGENCY DEPT VISIT HI MDM: CPT

## 2021-01-10 PROCEDURE — 80053 COMPREHEN METABOLIC PANEL: CPT

## 2021-01-10 PROCEDURE — U0005: CPT

## 2021-01-10 PROCEDURE — 96361 HYDRATE IV INFUSION ADD-ON: CPT

## 2021-01-10 PROCEDURE — 76705 ECHO EXAM OF ABDOMEN: CPT

## 2021-01-10 PROCEDURE — 36415 COLL VENOUS BLD VENIPUNCTURE: CPT

## 2021-01-10 PROCEDURE — 83690 ASSAY OF LIPASE: CPT

## 2021-01-10 PROCEDURE — 76705 ECHO EXAM OF ABDOMEN: CPT | Mod: 26

## 2021-01-10 PROCEDURE — 85025 COMPLETE CBC W/AUTO DIFF WBC: CPT

## 2021-01-10 PROCEDURE — 99284 EMERGENCY DEPT VISIT MOD MDM: CPT | Mod: 25

## 2021-01-10 PROCEDURE — U0003: CPT

## 2021-01-10 PROCEDURE — 81003 URINALYSIS AUTO W/O SCOPE: CPT

## 2021-01-10 RX ORDER — MORPHINE SULFATE 50 MG/1
4 CAPSULE, EXTENDED RELEASE ORAL ONCE
Refills: 0 | Status: DISCONTINUED | OUTPATIENT
Start: 2021-01-10 | End: 2021-01-10

## 2021-01-10 RX ORDER — SODIUM CHLORIDE 9 MG/ML
1000 INJECTION INTRAMUSCULAR; INTRAVENOUS; SUBCUTANEOUS ONCE
Refills: 0 | Status: COMPLETED | OUTPATIENT
Start: 2021-01-10 | End: 2021-01-10

## 2021-01-10 RX ADMIN — SODIUM CHLORIDE 1000 MILLILITER(S): 9 INJECTION INTRAMUSCULAR; INTRAVENOUS; SUBCUTANEOUS at 17:32

## 2021-01-10 RX ADMIN — SODIUM CHLORIDE 1000 MILLILITER(S): 9 INJECTION INTRAMUSCULAR; INTRAVENOUS; SUBCUTANEOUS at 18:32

## 2021-01-10 RX ADMIN — MORPHINE SULFATE 4 MILLIGRAM(S): 50 CAPSULE, EXTENDED RELEASE ORAL at 17:34

## 2021-01-10 NOTE — ED PROVIDER NOTE - PHYSICAL EXAMINATION
Limited PE performed in the setting of the COVID10 pandemic, in efforts to limit exposure and cross-contamination  Constitutional: Well appearing, well nourished, awake, alert, oriented to person, place, time/situation and in no apparent distress.  ENMT: Airway patent.   Eyes: Clear bilaterally  Cardiac: Normal rate, regular rhythm.   Respiratory: No increased WOB, tachypnea, hypoxia, or accessory mm use. Pt speaks in full sentences.   Gastrointestinal: Abd soft, ND. + ttp in the RUQ. No guarding, rebound, or rigidity. no CVAT  Musculoskeletal: Range of motion is not limited  Neuro: Alert and oriented x 3, face symmetric and speech fluent. Nml gross motor movement, grossly non focal   Skin: Skin normal color for race, warm, dry and intact. No evidence of rash.  Psych: Alert and oriented to person, place, time/situation. normal mood and affect. no apparent risk to self or others.

## 2021-01-10 NOTE — ED PROVIDER NOTE - OBJECTIVE STATEMENT
Pt w/ PMHx gallstones, asthma, PSHx multiple c/s p/w epigastric / RUQ pain, onset today s/p eating. The pain is sharp /dull, currently 5/10. No n/v/d/c. No f/c. The pain radiates to the back. no F/U/D or hematuria. Pt w/ known gallstones, has f/u w/ surgeon Dr Ramirez whom had recommended cholecystectomy. Pt was also referred for MRCP, but did not get prior auth.

## 2021-01-10 NOTE — ED ADULT TRIAGE NOTE - CHIEF COMPLAINT QUOTE
Patient presents complaining of RUQ abdominal pain has been told she has gallstones x 1 year ago.  Denies n/v/d, fevers, chills, admits she has had 4 C-sections (2009,2011,2014,2017). She denies urinary symptoms.

## 2021-01-10 NOTE — ED ADULT NURSE NOTE - CHPI ED NUR SYMPTOMS NEG
no abdominal distension/no blood in stool/no chills/no diarrhea/no dysuria/no fever/no hematuria/no nausea/no vomiting

## 2021-01-10 NOTE — ED PROVIDER NOTE - PROGRESS NOTE DETAILS
Pt feeling better. Given inc dilated CBD, and failed outpt f/u. Surgery consulted given pt seen by Dr Land. Surgery to see pt for recommendations and plan Pt seen and examined by surgery - offered admission vs outpt w/u and tx. Pt has opted for outpt. Will dc w/ f/u Dr Ramirez

## 2021-01-10 NOTE — ED PROVIDER NOTE - PATIENT PORTAL LINK FT
You can access the FollowMyHealth Patient Portal offered by [facility] by registering at the following website: [email or web address]. By joining [employer]’s FollowMyHealth portal, you will also be able to view your health information using other applications (apps) compatible with our system.

## 2021-01-10 NOTE — CONSULT NOTE ADULT - SUBJECTIVE AND OBJECTIVE BOX
CONSULT NOTE    HPI:  31 y/o f pmh Asthma and B-thal minor p/w RUQ pain. Patient previously presented to ED with similar symptoms 2020. U/S performed revealed cholelithiasis without cholecystitis w/ 0.6cm CBD. Recommended follow up with Dr. Ramirez outpatient. Seen by Dr. Ramirez 10/2020, recommended MRCP which was not performed. She now returns with one day RUQ intermittent pain, worst after meals. Able to tolerate PO and continues to have BMs. Denies nausea, emesis, fevers, chills, cp, sob.        PAST MEDICAL & SURGICAL HISTORY:  Thalassemia    Asthma    H/O  section        PAST SURGICAL HISTORY:    x 4    REVIEW OF SYSTEMS:   Pertinent positives/negatives noted in HPI.     HOME MEDICATIONS:    ALLERGIES:  Allergies    No Known Allergies    Intolerances        SOCIAL HISTORY:    FAMILY HISTORY:      Vital Signs Last 24 Hrs  T(C): 36.6 (10 Kris 2021 22:38), Max: 36.7 (10 Kris 2021 20:24)  T(F): 97.9 (10 Kris 2021 22:38), Max: 98.1 (10 Kris 2021 20:24)  HR: 66 (10 Kris 2021 22:38) (66 - 78)  BP: 110/77 (10 Kris 2021 22:38) (104/69 - 112/72)  BP(mean): --  RR: 16 (10 Kris 2021 22:38) (16 - 18)  SpO2: 100% (10 Kris 2021 22:38) (100% - 100%)    PHYSICAL EXAM:  General: NAD, resting comfortably in bed  C/V: NSR  Pulm: Nonlabored breathing, no respiratory distress  Abd: soft, non-distended, mild epigastric and RUQ discomfort to palpation, no rebound or guarding  Extrem: WWP, no edema,  Neuro: A/O x 3, CNs II-XII grossly intact, no focal deficits, normal sensation  Pulses: palpable distal pulses     LABS:                        11.5   10.35 )-----------( 321      ( 10 Kris 2021 17:09 )             38.0     01-10    139  |  105  |  9   ----------------------------<  139<H>  4.1   |  24  |  0.62    Ca    8.9      10 Kris 2021 17:09    TPro  7.9  /  Alb  4.6  /  TBili  0.6  /  DBili  x   /  AST  47<H>  /  ALT  36  /  AlkPhos  96  01-10      Urinalysis Basic - ( 10 Kris 2021 19:46 )    Color: Yellow / Appearance: Clear / S.025 / pH: x  Gluc: x / Ketone: NEGATIVE  / Bili: Negative / Urobili: 0.2 E.U./dL   Blood: x / Protein: NEGATIVE mg/dL / Nitrite: NEGATIVE   Leuk Esterase: NEGATIVE / RBC: x / WBC x   Sq Epi: x / Non Sq Epi: x / Bacteria: x        RADIOLOGY AND ADDITIONAL STUDIES  < from: US Abdomen Limited (01.10.21 @ 18:46) >    IMPRESSION:    1.  Cholelithiasis without evidence of acute cholecystitis.  2.  Since 2020, increased common bile duct dilation measuring up to 0.9 cm, previously 0.6 cm. No intrahepatic biliary ductal dilatation. No visualized choledocholithiasis. Consider further nonemergent evaluation with MRCP.    < end of copied text >

## 2021-01-10 NOTE — ED PROVIDER NOTE - NSFOLLOWUPINSTRUCTIONS_ED_ALL_ED_FT
You were evaluated in the ED for upper abdominal pain. Your ultrasound again showed gallstones, without signs of infection of the gallbladder. Your common bile duct was dilated (enlarged) but no stones were seen in it. Your pain was relieved.  You were evaluated by surgery team. You were offered admission, but you opted for outpatient continued follow up and treatment planning with Dr Ramirez.     Avoid fatty, greasy foods.     Follow up with Dr Ramirez this week.       Biliary Colic    WHAT YOU NEED TO KNOW:    Biliary colic is severe pain in your upper abdomen caused by a gallbladder problem. Your gallbladder stores bile, which helps break down the fats that you eat.     Gallbladder         DISCHARGE INSTRUCTIONS:    Medicines:   •Medicines can help decrease pain and muscle spasms. You may also need medicine to calm your stomach and stop vomiting.      •Take your medicine as directed. Contact your healthcare provider if you think your medicine is not helping or you have side effects. Tell him if you are allergic to any medicine. Keep a list of the medicines, vitamins, and herbs you take. Include the amounts, and when and why you take them. Bring the list or the pill bottles to follow-up visits. Carry your medicine list with you in case of an emergency.      Avoid alcohol: Alcohol can damage your gallbladder and make your symptoms worse.    Maintain a healthy weight: Ask your healthcare provider how much you should weigh. Ask him to help you create a weight loss plan if you are overweight.     Eat a variety of healthy foods: Healthy foods include fruits, vegetables, whole-grain breads, low-fat dairy products, beans, lean meats, and fish. Foods that are high in fiber and low in fat and cholesterol may decrease your symptoms. Ask if you need to be on a special diet.    Exercise: Ask your healthcare provider about the best exercise plan for you. Exercise may help improve your symptoms.    Follow up with your healthcare provider as directed: Bring a list of any questions you have so you remember to ask them during your visits.     Contact your healthcare provider if:   •You have a fever.      •Your pain gets worse, even after you take medicine.      •You have nausea or are vomiting.      •Your skin or eyes are yellow.      •You have questions or concerns about your condition or care.      Return to the emergency department if:   •You have severe pain.      •You feel like you are going to faint.      •You are short of breath.

## 2021-01-10 NOTE — ED PROVIDER NOTE - CLINICAL SUMMARY MEDICAL DECISION MAKING FREE TEXT BOX
Pt w/ known gallstones p/w RUQ pain s/p eating. DDx includes but not limited to biliary colic, choledocholelithiasis, cholecystitis, less likely pancreatitis, GERD, gastritis. Check labs, RUQ US, IVF, analgesia. Dispo pending w/u and clinical status

## 2021-01-10 NOTE — ED PROVIDER NOTE - CARE PROVIDER_API CALL
Paula Ramirez)  Surgery  [address]  [place], [place] [address]  Phone: [phone]  Fax: [phone]  Follow Up Time:

## 2021-01-10 NOTE — CONSULT NOTE ADULT - ASSESSMENT
31 y/o f pmh Asthma and B-thal minor p/w RUQ pain. Afebrile, HD stable, WBC 10.35, T bili 0.6, AST/ALT 47/36. U/S reveales cholelithiasis w/o cholecystitis w/ cbd of 0.9cm. Presentation 2/2 choledocholithiasis.    Given non emergent clinical picture, no acute intervention at this time  Discussion w/ patient for admission w/ outpatient MRCP opted to follow up outpatient  Discharge with follow in Dr. Ramirez's office  Plan discussed with attending.

## 2021-01-10 NOTE — ED ADULT NURSE NOTE - OBJECTIVE STATEMENT
31 yo F pmhx thalassemia, asthma, gallstones presents to ED co RLQ pain radiating to LLQ since this AM, pain is intermittent with no known causing or relieving factors. Aox3, speaking in full sentences. RUQ tender to palpation. Denies N/V/D, fevers, chills, CP. Pt also co "I feel like I have a UTI", denies burning with urination.

## 2021-01-16 ENCOUNTER — EMERGENCY (EMERGENCY)
Facility: HOSPITAL | Age: 31
LOS: 1 days | Discharge: HOME | End: 2021-01-16
Attending: EMERGENCY MEDICINE | Admitting: EMERGENCY MEDICINE
Payer: MEDICAID

## 2021-01-16 VITALS
TEMPERATURE: 98 F | DIASTOLIC BLOOD PRESSURE: 60 MMHG | SYSTOLIC BLOOD PRESSURE: 108 MMHG | OXYGEN SATURATION: 99 % | RESPIRATION RATE: 18 BRPM | HEART RATE: 69 BPM

## 2021-01-16 VITALS
TEMPERATURE: 98 F | OXYGEN SATURATION: 98 % | HEART RATE: 89 BPM | RESPIRATION RATE: 18 BRPM | SYSTOLIC BLOOD PRESSURE: 120 MMHG | HEIGHT: 60 IN | DIASTOLIC BLOOD PRESSURE: 71 MMHG | WEIGHT: 149.91 LBS

## 2021-01-16 DIAGNOSIS — Z79.899 OTHER LONG TERM (CURRENT) DRUG THERAPY: ICD-10-CM

## 2021-01-16 DIAGNOSIS — R10.13 EPIGASTRIC PAIN: ICD-10-CM

## 2021-01-16 DIAGNOSIS — K83.8 OTHER SPECIFIED DISEASES OF BILIARY TRACT: ICD-10-CM

## 2021-01-16 DIAGNOSIS — Z20.822 CONTACT WITH AND (SUSPECTED) EXPOSURE TO COVID-19: ICD-10-CM

## 2021-01-16 DIAGNOSIS — K80.20 CALCULUS OF GALLBLADDER WITHOUT CHOLECYSTITIS WITHOUT OBSTRUCTION: ICD-10-CM

## 2021-01-16 DIAGNOSIS — Z98.891 HISTORY OF UTERINE SCAR FROM PREVIOUS SURGERY: Chronic | ICD-10-CM

## 2021-01-16 DIAGNOSIS — J45.909 UNSPECIFIED ASTHMA, UNCOMPLICATED: ICD-10-CM

## 2021-01-16 LAB
ALBUMIN SERPL ELPH-MCNC: 4.5 G/DL — SIGNIFICANT CHANGE UP (ref 3.3–5)
ALP SERPL-CCNC: 128 U/L — HIGH (ref 40–120)
ALT FLD-CCNC: 138 U/L — HIGH (ref 10–45)
ANION GAP SERPL CALC-SCNC: 11 MMOL/L — SIGNIFICANT CHANGE UP (ref 5–17)
APPEARANCE UR: CLEAR — SIGNIFICANT CHANGE UP
AST SERPL-CCNC: 107 U/L — HIGH (ref 10–40)
BACTERIA # UR AUTO: ABNORMAL /HPF
BASOPHILS # BLD AUTO: 0 K/UL — SIGNIFICANT CHANGE UP (ref 0–0.2)
BASOPHILS NFR BLD AUTO: 0 % — SIGNIFICANT CHANGE UP (ref 0–2)
BILIRUB SERPL-MCNC: 0.7 MG/DL — SIGNIFICANT CHANGE UP (ref 0.2–1.2)
BILIRUB UR-MCNC: NEGATIVE — SIGNIFICANT CHANGE UP
BUN SERPL-MCNC: 13 MG/DL — SIGNIFICANT CHANGE UP (ref 7–23)
CALCIUM SERPL-MCNC: 9.5 MG/DL — SIGNIFICANT CHANGE UP (ref 8.4–10.5)
CHLORIDE SERPL-SCNC: 102 MMOL/L — SIGNIFICANT CHANGE UP (ref 96–108)
CO2 SERPL-SCNC: 26 MMOL/L — SIGNIFICANT CHANGE UP (ref 22–31)
COLOR SPEC: YELLOW — SIGNIFICANT CHANGE UP
COMMENT - URINE: SIGNIFICANT CHANGE UP
CREAT SERPL-MCNC: 0.62 MG/DL — SIGNIFICANT CHANGE UP (ref 0.5–1.3)
DACRYOCYTES BLD QL SMEAR: SLIGHT — SIGNIFICANT CHANGE UP
DIFF PNL FLD: NEGATIVE — SIGNIFICANT CHANGE UP
ELLIPTOCYTES BLD QL SMEAR: SLIGHT — SIGNIFICANT CHANGE UP
EOSINOPHIL # BLD AUTO: 0.46 K/UL — SIGNIFICANT CHANGE UP (ref 0–0.5)
EOSINOPHIL NFR BLD AUTO: 5.3 % — SIGNIFICANT CHANGE UP (ref 0–6)
EPI CELLS # UR: ABNORMAL /HPF (ref 0–5)
GGT SERPL-CCNC: 302 U/L — HIGH (ref 8–40)
GIANT PLATELETS BLD QL SMEAR: PRESENT — SIGNIFICANT CHANGE UP
GLUCOSE SERPL-MCNC: 101 MG/DL — HIGH (ref 70–99)
GLUCOSE UR QL: NEGATIVE — SIGNIFICANT CHANGE UP
HCG SERPL-ACNC: <0 MIU/ML — SIGNIFICANT CHANGE UP
HCT VFR BLD CALC: 36.5 % — SIGNIFICANT CHANGE UP (ref 34.5–45)
HGB BLD-MCNC: 11.1 G/DL — LOW (ref 11.5–15.5)
HYPOCHROMIA BLD QL: SIGNIFICANT CHANGE UP
KETONES UR-MCNC: NEGATIVE — SIGNIFICANT CHANGE UP
LEUKOCYTE ESTERASE UR-ACNC: NEGATIVE — SIGNIFICANT CHANGE UP
LIDOCAIN IGE QN: 33 U/L — SIGNIFICANT CHANGE UP (ref 7–60)
LYMPHOCYTES # BLD AUTO: 1.54 K/UL — SIGNIFICANT CHANGE UP (ref 1–3.3)
LYMPHOCYTES # BLD AUTO: 17.9 % — SIGNIFICANT CHANGE UP (ref 13–44)
MANUAL SMEAR VERIFICATION: SIGNIFICANT CHANGE UP
MCHC RBC-ENTMCNC: 20.9 PG — LOW (ref 27–34)
MCHC RBC-ENTMCNC: 30.4 GM/DL — LOW (ref 32–36)
MCV RBC AUTO: 68.9 FL — LOW (ref 80–100)
MONOCYTES # BLD AUTO: 0.61 K/UL — SIGNIFICANT CHANGE UP (ref 0–0.9)
MONOCYTES NFR BLD AUTO: 7.1 % — SIGNIFICANT CHANGE UP (ref 2–14)
NEUTROPHILS # BLD AUTO: 5.7 K/UL — SIGNIFICANT CHANGE UP (ref 1.8–7.4)
NEUTROPHILS NFR BLD AUTO: 64.3 % — SIGNIFICANT CHANGE UP (ref 43–77)
NEUTS BAND # BLD: 1.8 % — SIGNIFICANT CHANGE UP (ref 0–8)
NITRITE UR-MCNC: NEGATIVE — SIGNIFICANT CHANGE UP
OVALOCYTES BLD QL SMEAR: SLIGHT — SIGNIFICANT CHANGE UP
PH UR: 7 — SIGNIFICANT CHANGE UP (ref 5–8)
PLAT MORPH BLD: NORMAL — SIGNIFICANT CHANGE UP
PLATELET # BLD AUTO: 336 K/UL — SIGNIFICANT CHANGE UP (ref 150–400)
POIKILOCYTOSIS BLD QL AUTO: SLIGHT — SIGNIFICANT CHANGE UP
POLYCHROMASIA BLD QL SMEAR: SLIGHT — SIGNIFICANT CHANGE UP
POTASSIUM SERPL-MCNC: 4.2 MMOL/L — SIGNIFICANT CHANGE UP (ref 3.5–5.3)
POTASSIUM SERPL-SCNC: 4.2 MMOL/L — SIGNIFICANT CHANGE UP (ref 3.5–5.3)
PROT SERPL-MCNC: 8 G/DL — SIGNIFICANT CHANGE UP (ref 6–8.3)
PROT UR-MCNC: 30 MG/DL
RBC # BLD: 5.3 M/UL — HIGH (ref 3.8–5.2)
RBC # FLD: 15.1 % — HIGH (ref 10.3–14.5)
RBC BLD AUTO: ABNORMAL
RBC CASTS # UR COMP ASSIST: < 5 /HPF — SIGNIFICANT CHANGE UP
SARS-COV-2 RNA SPEC QL NAA+PROBE: SIGNIFICANT CHANGE UP
SMUDGE CELLS # BLD: PRESENT — SIGNIFICANT CHANGE UP
SODIUM SERPL-SCNC: 139 MMOL/L — SIGNIFICANT CHANGE UP (ref 135–145)
SP GR SPEC: 1.02 — SIGNIFICANT CHANGE UP (ref 1–1.03)
UROBILINOGEN FLD QL: 0.2 E.U./DL — SIGNIFICANT CHANGE UP
VARIANT LYMPHS # BLD: 3.6 % — SIGNIFICANT CHANGE UP (ref 0–6)
WBC # BLD: 8.63 K/UL — SIGNIFICANT CHANGE UP (ref 3.8–10.5)
WBC # FLD AUTO: 8.63 K/UL — SIGNIFICANT CHANGE UP (ref 3.8–10.5)
WBC UR QL: < 5 /HPF — SIGNIFICANT CHANGE UP

## 2021-01-16 PROCEDURE — 87086 URINE CULTURE/COLONY COUNT: CPT

## 2021-01-16 PROCEDURE — U0003: CPT

## 2021-01-16 PROCEDURE — 82977 ASSAY OF GGT: CPT

## 2021-01-16 PROCEDURE — 36415 COLL VENOUS BLD VENIPUNCTURE: CPT

## 2021-01-16 PROCEDURE — 80053 COMPREHEN METABOLIC PANEL: CPT

## 2021-01-16 PROCEDURE — U0005: CPT

## 2021-01-16 PROCEDURE — 96374 THER/PROPH/DIAG INJ IV PUSH: CPT

## 2021-01-16 PROCEDURE — 99285 EMERGENCY DEPT VISIT HI MDM: CPT

## 2021-01-16 PROCEDURE — 99284 EMERGENCY DEPT VISIT MOD MDM: CPT | Mod: 25

## 2021-01-16 PROCEDURE — 85025 COMPLETE CBC W/AUTO DIFF WBC: CPT

## 2021-01-16 PROCEDURE — 81001 URINALYSIS AUTO W/SCOPE: CPT

## 2021-01-16 PROCEDURE — 83690 ASSAY OF LIPASE: CPT

## 2021-01-16 PROCEDURE — 84702 CHORIONIC GONADOTROPIN TEST: CPT

## 2021-01-16 PROCEDURE — 76705 ECHO EXAM OF ABDOMEN: CPT | Mod: 26

## 2021-01-16 PROCEDURE — 76705 ECHO EXAM OF ABDOMEN: CPT

## 2021-01-16 RX ORDER — FAMOTIDINE 10 MG/ML
20 INJECTION INTRAVENOUS ONCE
Refills: 0 | Status: COMPLETED | OUTPATIENT
Start: 2021-01-16 | End: 2021-01-16

## 2021-01-16 RX ORDER — SODIUM CHLORIDE 9 MG/ML
3 INJECTION INTRAMUSCULAR; INTRAVENOUS; SUBCUTANEOUS EVERY 8 HOURS
Refills: 0 | Status: DISCONTINUED | OUTPATIENT
Start: 2021-01-16 | End: 2021-01-20

## 2021-01-16 RX ADMIN — SODIUM CHLORIDE 3 MILLILITER(S): 9 INJECTION INTRAMUSCULAR; INTRAVENOUS; SUBCUTANEOUS at 16:16

## 2021-01-16 RX ADMIN — FAMOTIDINE 20 MILLIGRAM(S): 10 INJECTION INTRAVENOUS at 19:56

## 2021-01-16 RX ADMIN — SODIUM CHLORIDE 3 MILLILITER(S): 9 INJECTION INTRAMUSCULAR; INTRAVENOUS; SUBCUTANEOUS at 20:25

## 2021-01-16 NOTE — ED PROVIDER NOTE - CLINICAL SUMMARY MEDICAL DECISION MAKING FREE TEXT BOX
29 yo F with hx gallstones with recurrent upper abd pain s/p eating-- rad to back - non toxic  await labs and U/S 29 yo F with hx gallstones with recurrent upper abd pain s/p eating-- rad to back - non toxic  await labs and U/S  CBVD  chronic dilation slight LFT elev -- seen  by surgery --ok for dc

## 2021-01-16 NOTE — ED PROVIDER NOTE - PATIENT PORTAL LINK FT
You can access the FollowMyHealth Patient Portal offered by [facility] by registering at the following website: [email or web address]. By joining [employer]’s FollowMyHealth portal, you will also be able to view your health information using other applications (apps) compatible with our system.

## 2021-01-16 NOTE — ED PROVIDER NOTE - OBJECTIVE STATEMENT
31 y/o f pmh Asthma and B-thal minor p/w RUQ pain x 3 hrs- she  had an acai bowel with fruit and dev pain 1 hr later-  no N/V/D- no fever or chill s+ radiation of pain to right upper back  Patient previously presented to ED with similar symptoms 08/2020. U/S performed revealed cholelithiasis without cholecystitis w/ 0.6cm CBD. Recommended follow up with Dr. Ramirez outpatient. Seen by Dr. Ramirez 10/2020, recommended MRCP which was not performed. She now returns with one day RUQ intermittent pain, worst after meals. Able to tolerate PO and continues to have BMs. Denies nausea, emesis, fevers, chills, cp, sob.

## 2021-01-16 NOTE — ED PROVIDER NOTE - CARE PROVIDER_API CALL
Paula Ramirez)  Surgery  [address] [place] [address]  Phone: [phone]  Fax: [phone]  Follow Up Time: 1-3 Days

## 2021-01-16 NOTE — CONSULT NOTE ADULT - SUBJECTIVE AND OBJECTIVE BOX
SURGERY CONSULT  ==============================================================================================================  HPI: 30y Female  HPI: 31 y/o F with PMH of Asthma and B-thalassemia minor p/w RUQ pain of 2 h duration that resolved on its own without medication. Patient previously presented to ED with similar symptoms 2020 and then 1/10/2021. U/S performed revealed cholelithiasis without cholecystitis w/ 0.6cm CBD in August, and 0.9 6 days ago. Patient has seen Barb Ramirez as outpatient. Seen by Dr. Ramirez 10/2020, recommended MRCP which was not performed, and then called her office on Monday to arrange MRCP but she never heard back from them.. She now returns with a few hours of RUQ pain, after meal. Able to tolerate PO and continues to have BMs. Denies nausea, emesis, fevers, chills, cp, sob.          PAST MEDICAL & SURGICAL HISTORY:  Thalassemia    Asthma    H/O  section      Home Meds: Home Medications:    Allergies: Allergies    No Known Allergies    Intolerances      Soc:   Advanced Directives: Presumed Full Code     CURRENT MEDICATIONS:   --------------------------------------------------------------------------------------  Neurologic Medications    Respiratory Medications    Cardiovascular Medications    Gastrointestinal Medications  sodium chloride 0.9% lock flush 3 milliLiter(s) IV Push every 8 hours    Genitourinary Medications    Hematologic/Oncologic Medications    Antimicrobial/Immunologic Medications    Endocrine/Metabolic Medications    Topical/Other Medications    --------------------------------------------------------------------------------------    VITAL SIGNS, INS/OUTS (last 24 hours):  --------------------------------------------------------------------------------------  ICU Vital Signs Last 24 Hrs  T(C): 36.4 (2021 22:33), Max: 36.7 (2021 15:59)  T(F): 97.5 (2021 22:33), Max: 98 (2021 15:59)  HR: 69 (2021 22:33) (69 - 89)  BP: 108/60 (2021 22:33) (108/60 - 120/71)  BP(mean): --  ABP: --  ABP(mean): --  RR: 18 (2021 22:33) (18 - 18)  SpO2: 99% (2021 22:33) (98% - 99%)    I&O's Summary    --------------------------------------------------------------------------------------    EXAM:  General: NAD, sitting in stretcher  HEENT: NC/AT, EOMI  Neuro: A&Ox3  Resp: non-labored breathing  Abdomen: soft, non-distended, non-tender, neg. Christian's  Extremities: warm, non-edematous         LABS  --------------------------------------------------------------------------------------  Labs:  CAPILLARY BLOOD GLUCOSE                              11.1   8.63  )-----------( 336      ( 2021 16:36 )             36.5       Auto Neutrophil %: 64.3 % (21 @ 16:36)  Band Neutrophils %: 1.8 % (21 @ 16:36)        139  |  102  |  13  ----------------------------<  101<H>  4.2   |  26  |  0.62      Calcium, Total Serum: 9.5 mg/dL (21 @ 16:36)      LFTs:             8.0  | 0.7  | 107      ------------------[128     ( 2021 16:36 )  4.5  | x    | 138         Lipase:33     Amylase:x             Coags:            Urinalysis Basic - ( 2021 16:36 )    Color: Yellow / Appearance: Clear / S.020 / pH: x  Gluc: x / Ketone: NEGATIVE  / Bili: Negative / Urobili: 0.2 E.U./dL   Blood: x / Protein: 30 mg/dL / Nitrite: NEGATIVE   Leuk Esterase: NEGATIVE / RBC: < 5 /HPF / WBC < 5 /HPF   Sq Epi: x / Non Sq Epi: 5-10 /HPF / Bacteria: Many /HPF          --------------------------------------------------------------------------------------      IMAGING RESULTS  EXAM: US ABDOMEN LIMITED    PROCEDURE DATE: 2021        INTERPRETATION: RIGHT UPPER QUADRANT ULTRASOUND dated 2021 5:42 PM    INDICATION: Right upper quadrant pain; history of gallstones.    PRIOR STUDIES: None.    FINDINGS:    Liver: Normal echogenicity with no visible focal abnormality. Normal size, measuring 14.5 cm in cephalocaudal length. Patent portal vein with normal directionality of flow.    Intrahepatic ducts: Not dilated.    Common bile duct: Minimal interval decrease in mild dilation measuring up to 0.7 cm. No visualized common bile duct stones.    Gallbladder: The gallbladder is contracted secondary to nonfasting state. There are again numerous mobile echogenic foci with posterior acoustic shadowing, consistent with gallstones. No wall thickening or pericholecystic fluid. Negative sonographic Christian sign.    Pancreas: The visualized portions were normal in appearance.    Abdominal aorta: No abdominal aortic aneurysm is seen.    Inferior vena cava: The visualized portions were normal in appearance.    Right kidney: Length of 11.4 cm. Normal echogenicity. No focal lesions. No hydronephrosis.      IMPRESSION:  1. Cholelithiasis without sonographic evidence of acute cholecystitis. No sonographic evidence of choledocholithiasis.  2. Minimal interval decrease in mild common bile duct dilation, measuring up to 0.7 cm. Consider further evaluation with nonemergent MRCP as clinically warranted.        ASSESSMENT/ PLAN:  31 y/o F with PMH of Asthma and B-thalassemia minor p/w RUQ pain of 2 h duration that resolved on its own without medication. Patient previously presented to ED with similar symptoms 2020 and then 1/10/2021. U/S performed revealed cholelithiasis without cholecystitis w/ 0.6cm CBD in August, and 0.9 6 days ago. Patient has seen Barb Raimrez as outpatient. Seen by Dr. Ramirez 10/2020, recommended MRCP which was not performed, and then called her office on Monday to arrange MRCP but she never heard back from them.. She now returns with a few hours of RUQ pain, after meal. Able to tolerate PO and continues to have BMs. Denies nausea, emesis, fevers, chills, cp, sob. She is afebrile, HDS, with no abdominal pain, no leucocytosis, persistent CBD dilation, normal T.tomasz, and mild elevation of LFTs.     - No acute surgical intervention  - It was explained to patient that if she gets admitted, the MRCP might not get done for a few days  - Ok to discharge home with outpatient f/u. She was told to call the office on Tuesday to arrange the MRCP.      Attending aware and agrees with plan

## 2021-01-16 NOTE — ED PROVIDER NOTE - CHIEF COMPLAINT
Atrial fibrillation    Degenerative Joint Disease    Diabetes mellitus, type II    H/O: GI Bleed    Hiatal hernia    Hypertension    Hypothyroidism    Obesity    Overactive Bladder    Peripheral Neuropathy    Spinal stenosis of lumbar region    Uterine Polyp
The patient is a 30y Female complaining of abdominal pain.

## 2021-01-16 NOTE — ED ADULT NURSE NOTE - OBJECTIVE STATEMENT
Pt is a 29 y/o female A&Ox4 in NAD c/o RUQ pain radiating to right upper back x 3 hours. Pt reports pain started s/p "eating fruit." Pt denies N/V/D, fever/chills, dysuria. Pt talking in clear, full sentences, respirations even and unlabored.

## 2021-01-16 NOTE — ED PROVIDER NOTE - PROGRESS NOTE DETAILS
mild LFT elev noted -- surgery aware -- T bili lipase wnl-- no evidence for acute choly by US or exam --pt feeling much better - no further pain - no N/V in ED- pt now hungry  - seen by surgery rec dc and follow up with dr Ramirez this week -

## 2021-01-18 LAB
CULTURE RESULTS: NO GROWTH — SIGNIFICANT CHANGE UP
SPECIMEN SOURCE: SIGNIFICANT CHANGE UP

## 2021-02-16 ENCOUNTER — RESULT REVIEW (OUTPATIENT)
Age: 31
End: 2021-02-16

## 2021-02-16 ENCOUNTER — OUTPATIENT (OUTPATIENT)
Dept: OUTPATIENT SERVICES | Facility: HOSPITAL | Age: 31
LOS: 1 days | End: 2021-02-16

## 2021-02-16 ENCOUNTER — APPOINTMENT (OUTPATIENT)
Dept: MRI IMAGING | Facility: CLINIC | Age: 31
End: 2021-02-16
Payer: MEDICAID

## 2021-02-16 DIAGNOSIS — Z98.891 HISTORY OF UTERINE SCAR FROM PREVIOUS SURGERY: Chronic | ICD-10-CM

## 2021-02-16 PROCEDURE — 74181 MRI ABDOMEN W/O CONTRAST: CPT | Mod: 26

## 2021-03-08 ENCOUNTER — INPATIENT (INPATIENT)
Facility: HOSPITAL | Age: 31
LOS: 2 days | Discharge: HOME | DRG: 419 | End: 2021-03-11
Attending: SURGERY | Admitting: SURGERY
Payer: MEDICAID

## 2021-03-08 VITALS
OXYGEN SATURATION: 98 % | RESPIRATION RATE: 18 BRPM | HEART RATE: 80 BPM | HEIGHT: 60 IN | TEMPERATURE: 98 F | WEIGHT: 134.92 LBS | DIASTOLIC BLOOD PRESSURE: 42 MMHG | SYSTOLIC BLOOD PRESSURE: 96 MMHG

## 2021-03-08 DIAGNOSIS — Z98.891 HISTORY OF UTERINE SCAR FROM PREVIOUS SURGERY: Chronic | ICD-10-CM

## 2021-03-08 LAB
ALBUMIN SERPL ELPH-MCNC: 4.1 G/DL — SIGNIFICANT CHANGE UP (ref 3.3–5)
ALP SERPL-CCNC: 89 U/L — SIGNIFICANT CHANGE UP (ref 40–120)
ALT FLD-CCNC: 75 U/L — HIGH (ref 10–45)
ANION GAP SERPL CALC-SCNC: 9 MMOL/L — SIGNIFICANT CHANGE UP (ref 5–17)
ANISOCYTOSIS BLD QL: SLIGHT — SIGNIFICANT CHANGE UP
APPEARANCE UR: CLEAR — SIGNIFICANT CHANGE UP
AST SERPL-CCNC: 120 U/L — HIGH (ref 10–40)
BACTERIA # UR AUTO: PRESENT /HPF
BASOPHILS # BLD AUTO: 0 K/UL — SIGNIFICANT CHANGE UP (ref 0–0.2)
BASOPHILS NFR BLD AUTO: 0 % — SIGNIFICANT CHANGE UP (ref 0–2)
BILIRUB SERPL-MCNC: 0.8 MG/DL — SIGNIFICANT CHANGE UP (ref 0.2–1.2)
BILIRUB UR-MCNC: ABNORMAL
BUN SERPL-MCNC: 12 MG/DL — SIGNIFICANT CHANGE UP (ref 7–23)
CALCIUM SERPL-MCNC: 8.8 MG/DL — SIGNIFICANT CHANGE UP (ref 8.4–10.5)
CHLORIDE SERPL-SCNC: 107 MMOL/L — SIGNIFICANT CHANGE UP (ref 96–108)
CO2 SERPL-SCNC: 22 MMOL/L — SIGNIFICANT CHANGE UP (ref 22–31)
COLOR SPEC: YELLOW — SIGNIFICANT CHANGE UP
COMMENT - URINE: SIGNIFICANT CHANGE UP
CREAT SERPL-MCNC: 0.61 MG/DL — SIGNIFICANT CHANGE UP (ref 0.5–1.3)
DACRYOCYTES BLD QL SMEAR: SLIGHT — SIGNIFICANT CHANGE UP
DIFF PNL FLD: NEGATIVE — SIGNIFICANT CHANGE UP
EOSINOPHIL # BLD AUTO: 0.42 K/UL — SIGNIFICANT CHANGE UP (ref 0–0.5)
EOSINOPHIL NFR BLD AUTO: 4.4 % — SIGNIFICANT CHANGE UP (ref 0–6)
EPI CELLS # UR: ABNORMAL /HPF (ref 0–5)
GLUCOSE SERPL-MCNC: 120 MG/DL — HIGH (ref 70–99)
GLUCOSE UR QL: NEGATIVE — SIGNIFICANT CHANGE UP
HCG SERPL-ACNC: <0 MIU/ML — SIGNIFICANT CHANGE UP
HCT VFR BLD CALC: 35.9 % — SIGNIFICANT CHANGE UP (ref 34.5–45)
HGB BLD-MCNC: 10.6 G/DL — LOW (ref 11.5–15.5)
KETONES UR-MCNC: NEGATIVE — SIGNIFICANT CHANGE UP
LEUKOCYTE ESTERASE UR-ACNC: NEGATIVE — SIGNIFICANT CHANGE UP
LIDOCAIN IGE QN: 28 U/L — SIGNIFICANT CHANGE UP (ref 7–60)
LYMPHOCYTES # BLD AUTO: 1.41 K/UL — SIGNIFICANT CHANGE UP (ref 1–3.3)
LYMPHOCYTES # BLD AUTO: 14.9 % — SIGNIFICANT CHANGE UP (ref 13–44)
MANUAL SMEAR VERIFICATION: SIGNIFICANT CHANGE UP
MCHC RBC-ENTMCNC: 20.4 PG — LOW (ref 27–34)
MCHC RBC-ENTMCNC: 29.5 GM/DL — LOW (ref 32–36)
MCV RBC AUTO: 69 FL — LOW (ref 80–100)
MICROCYTES BLD QL: SIGNIFICANT CHANGE UP
MONOCYTES # BLD AUTO: 0.25 K/UL — SIGNIFICANT CHANGE UP (ref 0–0.9)
MONOCYTES NFR BLD AUTO: 2.6 % — SIGNIFICANT CHANGE UP (ref 2–14)
NEUTROPHILS # BLD AUTO: 7.38 K/UL — SIGNIFICANT CHANGE UP (ref 1.8–7.4)
NEUTROPHILS NFR BLD AUTO: 78.1 % — HIGH (ref 43–77)
NITRITE UR-MCNC: NEGATIVE — SIGNIFICANT CHANGE UP
OVALOCYTES BLD QL SMEAR: SLIGHT — SIGNIFICANT CHANGE UP
PH UR: 6 — SIGNIFICANT CHANGE UP (ref 5–8)
PLAT MORPH BLD: NORMAL — SIGNIFICANT CHANGE UP
PLATELET # BLD AUTO: 312 K/UL — SIGNIFICANT CHANGE UP (ref 150–400)
POIKILOCYTOSIS BLD QL AUTO: SLIGHT — SIGNIFICANT CHANGE UP
POLYCHROMASIA BLD QL SMEAR: SLIGHT — SIGNIFICANT CHANGE UP
POTASSIUM SERPL-MCNC: 4.5 MMOL/L — SIGNIFICANT CHANGE UP (ref 3.5–5.3)
POTASSIUM SERPL-SCNC: 4.5 MMOL/L — SIGNIFICANT CHANGE UP (ref 3.5–5.3)
PROT SERPL-MCNC: 7.2 G/DL — SIGNIFICANT CHANGE UP (ref 6–8.3)
PROT UR-MCNC: ABNORMAL MG/DL
RBC # BLD: 5.2 M/UL — SIGNIFICANT CHANGE UP (ref 3.8–5.2)
RBC # FLD: 14.9 % — HIGH (ref 10.3–14.5)
RBC BLD AUTO: ABNORMAL
RBC CASTS # UR COMP ASSIST: < 5 /HPF — SIGNIFICANT CHANGE UP
SCHISTOCYTES BLD QL AUTO: SLIGHT — SIGNIFICANT CHANGE UP
SODIUM SERPL-SCNC: 138 MMOL/L — SIGNIFICANT CHANGE UP (ref 135–145)
SP GR SPEC: >=1.03 — SIGNIFICANT CHANGE UP (ref 1–1.03)
UROBILINOGEN FLD QL: 0.2 E.U./DL — SIGNIFICANT CHANGE UP
WBC # BLD: 9.45 K/UL — SIGNIFICANT CHANGE UP (ref 3.8–10.5)
WBC # FLD AUTO: 9.45 K/UL — SIGNIFICANT CHANGE UP (ref 3.8–10.5)
WBC UR QL: < 5 /HPF — SIGNIFICANT CHANGE UP

## 2021-03-08 PROCEDURE — 99285 EMERGENCY DEPT VISIT HI MDM: CPT

## 2021-03-08 PROCEDURE — 76705 ECHO EXAM OF ABDOMEN: CPT | Mod: 26

## 2021-03-08 PROCEDURE — 93010 ELECTROCARDIOGRAM REPORT: CPT

## 2021-03-08 RX ORDER — SODIUM CHLORIDE 9 MG/ML
1000 INJECTION, SOLUTION INTRAVENOUS
Refills: 0 | Status: DISCONTINUED | OUTPATIENT
Start: 2021-03-08 | End: 2021-03-10

## 2021-03-08 RX ORDER — KETOROLAC TROMETHAMINE 30 MG/ML
30 SYRINGE (ML) INJECTION ONCE
Refills: 0 | Status: DISCONTINUED | OUTPATIENT
Start: 2021-03-08 | End: 2021-03-08

## 2021-03-08 RX ORDER — ONDANSETRON 8 MG/1
4 TABLET, FILM COATED ORAL EVERY 6 HOURS
Refills: 0 | Status: DISCONTINUED | OUTPATIENT
Start: 2021-03-08 | End: 2021-03-10

## 2021-03-08 RX ORDER — BUDESONIDE AND FORMOTEROL FUMARATE DIHYDRATE 160; 4.5 UG/1; UG/1
2 AEROSOL RESPIRATORY (INHALATION)
Refills: 0 | Status: DISCONTINUED | OUTPATIENT
Start: 2021-03-08 | End: 2021-03-10

## 2021-03-08 RX ORDER — ONDANSETRON 8 MG/1
4 TABLET, FILM COATED ORAL ONCE
Refills: 0 | Status: COMPLETED | OUTPATIENT
Start: 2021-03-08 | End: 2021-03-08

## 2021-03-08 RX ORDER — SODIUM CHLORIDE 9 MG/ML
1000 INJECTION INTRAMUSCULAR; INTRAVENOUS; SUBCUTANEOUS ONCE
Refills: 0 | Status: COMPLETED | OUTPATIENT
Start: 2021-03-08 | End: 2021-03-08

## 2021-03-08 RX ORDER — ALBUTEROL 90 UG/1
2 AEROSOL, METERED ORAL EVERY 6 HOURS
Refills: 0 | Status: DISCONTINUED | OUTPATIENT
Start: 2021-03-08 | End: 2021-03-10

## 2021-03-08 RX ORDER — HEPARIN SODIUM 5000 [USP'U]/ML
5000 INJECTION INTRAVENOUS; SUBCUTANEOUS EVERY 8 HOURS
Refills: 0 | Status: DISCONTINUED | OUTPATIENT
Start: 2021-03-08 | End: 2021-03-10

## 2021-03-08 RX ORDER — ACETAMINOPHEN 500 MG
650 TABLET ORAL EVERY 6 HOURS
Refills: 0 | Status: DISCONTINUED | OUTPATIENT
Start: 2021-03-08 | End: 2021-03-10

## 2021-03-08 RX ORDER — HYDROMORPHONE HYDROCHLORIDE 2 MG/ML
0.5 INJECTION INTRAMUSCULAR; INTRAVENOUS; SUBCUTANEOUS EVERY 4 HOURS
Refills: 0 | Status: DISCONTINUED | OUTPATIENT
Start: 2021-03-08 | End: 2021-03-09

## 2021-03-08 RX ADMIN — SODIUM CHLORIDE 1000 MILLILITER(S): 9 INJECTION INTRAMUSCULAR; INTRAVENOUS; SUBCUTANEOUS at 16:00

## 2021-03-08 RX ADMIN — HEPARIN SODIUM 5000 UNIT(S): 5000 INJECTION INTRAVENOUS; SUBCUTANEOUS at 22:27

## 2021-03-08 RX ADMIN — ONDANSETRON 4 MILLIGRAM(S): 8 TABLET, FILM COATED ORAL at 14:56

## 2021-03-08 RX ADMIN — Medication 30 MILLIGRAM(S): at 14:56

## 2021-03-08 RX ADMIN — SODIUM CHLORIDE 1000 MILLILITER(S): 9 INJECTION INTRAMUSCULAR; INTRAVENOUS; SUBCUTANEOUS at 14:45

## 2021-03-08 RX ADMIN — Medication 30 MILLIGRAM(S): at 15:30

## 2021-03-08 NOTE — H&P ADULT - ASSESSMENT
This is a 31 y/o F PMH: Asthma, Thalassemia Minor, PSH:  x4 last was , presents with 1xd RUQ with radiation to the back, no fever or vomiting but chills and vomiting. Was supposed to undergo lap arely by the end of the month by Dr. crane    Afebrile, Mild RUQ tenderness, Christian sign is negative  WBC WNL, TBili 0.8, , ALT 75, normal alk phos  RUQ USS Cholelithiasis without definite evidence of acute cholecystitis, Interval increase in common bile duct now measuring 0.9 cm. Consider follow-up MRCP if clinically indicated.    Discussed with attending and chief resident:    - Admit to regional  - NPO, IVF, Pain killers  - Possible lap arely during this admission  - FU labs in am including LFTs

## 2021-03-08 NOTE — ED PROVIDER NOTE - ATTENDING CONTRIBUTION TO CARE
29 y/o F PMHx asthma, here with RUQ abdominal pain and nausea. Has planned cholecystectomy Dr. Cuellar at the end of this month. Pt veered away from diet last night and ate steak. Denies fever and chills. On physical exam, Pt appears well and nontoxic. Lungs are clear and heart rate is regular. She is tender to palpation in the RUQ. Plan for labs, medications, and ultrasound. Will reevaluate. us shows increased gall baldder wall and cbd. elevated lft (slightly decreased from previous er visit). Sugery consulted. plan for admission for surgery. pt agreeable to plan.    Agree with above note as documented by PA.  I was available as the supervising attending during patient's ER evaluation.

## 2021-03-08 NOTE — ED PROVIDER NOTE - OBJECTIVE STATEMENT
31 y/o F PMHx asthma, thalassemia, scheduled for gallbladder surgery later this month. Pt deviated from her diet last night and head steak. At 11AM this morning, she began to experience RUQ pain, which worsened at 1PM. She endorses associated nausea. She claims to have some dyspnea secondary to abdominal pain. Denies fevers, chills, vomiting, bowel and urinary dysfunction.

## 2021-03-08 NOTE — H&P ADULT - NSHPLABSRESULTS_GEN_ALL_CORE
LABS:                        10.6   9.45  )-----------( 312      ( 08 Mar 2021 14:42 )             35.9     03-08    138  |  107  |  12  ----------------------------<  120<H>  4.5   |  22  |  0.61    Ca    8.8      08 Mar 2021 14:42    TPro  7.2  /  Alb  4.1  /  TBili  0.8  /  DBili  x   /  AST  120<H>  /  ALT  75<H>  /  AlkPhos  89  03-08      Urinalysis Basic - ( 08 Mar 2021 15:05 )    Color: Yellow / Appearance: Clear / SG: >=1.030 / pH: x  Gluc: x / Ketone: NEGATIVE  / Bili: Small / Urobili: 0.2 E.U./dL   Blood: x / Protein: Trace mg/dL / Nitrite: NEGATIVE   Leuk Esterase: NEGATIVE / RBC: < 5 /HPF / WBC < 5 /HPF   Sq Epi: x / Non Sq Epi: 5-10 /HPF / Bacteria: Present /HPF      CAPILLARY BLOOD GLUCOSE        LIVER FUNCTIONS - ( 08 Mar 2021 14:42 )  Alb: 4.1 g/dL / Pro: 7.2 g/dL / ALK PHOS: 89 U/L / ALT: 75 U/L / AST: 120 U/L / GGT: x             Cultures:      RADIOLOGY & ADDITIONAL STUDIES: LABS:                        10.6   9.45  )-----------( 312      ( 08 Mar 2021 14:42 )             35.9     03-08    138  |  107  |  12  ----------------------------<  120<H>  4.5   |  22  |  0.61    Ca    8.8      08 Mar 2021 14:42    TPro  7.2  /  Alb  4.1  /  TBili  0.8  /  DBili  x   /  AST  120<H>  /  ALT  75<H>  /  AlkPhos  89  03-08      Urinalysis Basic - ( 08 Mar 2021 15:05 )    Color: Yellow / Appearance: Clear / SG: >=1.030 / pH: x  Gluc: x / Ketone: NEGATIVE  / Bili: Small / Urobili: 0.2 E.U./dL   Blood: x / Protein: Trace mg/dL / Nitrite: NEGATIVE   Leuk Esterase: NEGATIVE / RBC: < 5 /HPF / WBC < 5 /HPF   Sq Epi: x / Non Sq Epi: 5-10 /HPF / Bacteria: Present /HPF      CAPILLARY BLOOD GLUCOSE        LIVER FUNCTIONS - ( 08 Mar 2021 14:42 )  Alb: 4.1 g/dL / Pro: 7.2 g/dL / ALK PHOS: 89 U/L / ALT: 75 U/L / AST: 120 U/L / GGT: x             Cultures:      RADIOLOGY & ADDITIONAL STUDIES:    < from: US Abdomen Limited (03.08.21 @ 16:49) >    Findings:    Liver: Normal size. Normal echogenicity. There are no focal hepatic lesions.    Bile ducts: There is no intrahepatic biliary ductal dilatation. The common duct measures 0.9 cm, previously 0.5 cm on MR abdomen. The common bile duct tapers distally to measure 0.8 cm at the level of the pancreatic head.    Gallbladder: There is again cholelithiasis. No significant gallbladder wall thickening. No pericholecystic fluid. Evaluation for sonographic Christian's sign is limited due to recent analgesic administration.    Pancreas: The visualized portions appear normal.    Right kidney: The right kidney is normal in size, measuring 11.9 cm in length. There is normal right renal parenchymal thickness and echogenicity.  There is no right hydronephrosis.  No renal mass is identified. No renal stone is seen.    Ascites: There is no ascites in the right upper quadrant.    Vessels: The proximal portions of the aorta and inferior vena cava are unremarkable. Normal hepatopetal blood flow demonstrated within main portal vein. Hepatic veins are patent.    Impression:  1.  Cholelithiasis without definite evidence of acute cholecystitis.  2.  Interval increase in common bile duct now measuring 0.9 cm. Consider follow-up MRCP if clinically indicated.    < end of copied text >

## 2021-03-08 NOTE — H&P ADULT - HISTORY OF PRESENT ILLNESS
Attending:      HPI:      PMH: Asthma, Thalassemia minot  PSH:  x4 last was   SH: denies tobacco, etoh and recreational drugs  FH: Father DM  Meds: Breo-ellipta and Ventolin  Allerg:    PAST MEDICAL & SURGICAL HISTORY:  Thalassemia    Asthma    H/O  section             HPI:    This is a 31 y/o F PMH: Asthma, Thalassemia Minor, PSH:  x4 last was , was supposed to get lap arely by Dr. Ramirez by the end of this month for recurrent biliary colic 2/2 to cholelithiasis, the patient had an MRI last February since she had transaminitis and fluctuating CBD diameter 0.5 to 0.9 and showed no choledocholithiasis she comes to ED today with history of 1xd RUQ pain after eating a meal of steak yesterday, the pain is radiating to the back and is episodic and lasts for a few minutes, associated with nausea and chills, no fever or vomiting, had only history of 1 blood transfusion in one of her c-sections, the last time she saw a hematologist was 10 years ago.  No changes in urine or stool color, no history of recent contact with a sick patient or travel outside the [facility].    On arrival to ED vitals are stable, afebrile, WBCs 9.5, mild transaminitis USS no cholecystitis, CBD 0.9 CM      PMH: Asthma, Thalassemia Minor  PSH:  x4 last was   SH: denies tobacco, etoh and recreational drugs  FH: Father DM  Meds: Breo-ellipta and Ventolin  Allerg:         HPI:    This is a 29 y/o F PMH: Asthma, Thalassemia Minor, PSH:  x4 last was , was supposed to get lap arely by Dr. Ramirez by the end of this month for recurrent biliary colic 2/2 to cholelithiasis, the patient had an MRI last February since she had transaminitis and fluctuating CBD diameter 0.5 to 0.9 and showed no choledocholithiasis she comes to ED today with history of 1xd RUQ pain after eating a meal of steak yesterday, the pain is radiating to the back and is episodic and lasts for a few minutes, associated with nausea and chills, no fever or vomiting, had only history of 1 blood transfusion in one of her c-sections, the last time she saw a hematologist was 10 years ago.  No changes in urine or stool color, no history of recent contact with a sick patient or travel outside the [facility].    On arrival to ED vitals are stable, afebrile, WBCs 9.5, mild transaminitis USS no cholecystitis, CBD 0.9 CM      PMH: Asthma, Thalassemia Minor  PSH:  x4 last was   SH: denies tobacco, etoh and recreational drugs  FH: Father DM  Meds: Breo-ellipta and Ventolin  Allerg: NKDA

## 2021-03-08 NOTE — ED PROVIDER NOTE - PHYSICAL EXAMINATION
General: Patient is well developed and well nourished. Patient is alert and oriented to person, place and date. Patient is sitting stretcher and appears in no acute distress.  HEENT: Head is normocephalic and atraumatic. Pupils are equal, round and reactive. Extraocular movements intact. No evidence of nystagmus, conjunctival injection, or scleral icterus. External ears symmetric without evidence of discharge.  Nose is symmetric, non-tender, patent without evidence of discharge. Teeth in good repair. Uvula midline.   Neck: Supple with no evidence of lymphadenopathy.  Full range of motion.  Heart: Regular rate and rhythm. No murmurs, rubs or gallops.   Lungs: Clear to auscultation bilaterally with equal chest expansion. No note of wheezes, rhonchi, rales. Equal chest expansion. No note of retractions.  Abdomen: Bowel sounds present in all four quadrants. Soft, non-tender, non-distended without signs of masses, rebound or guarding. No note of hepatosplenomegaly. No CVA tenderness bilaterally. Negative Christian sign or McBurney's.  :   	MALE: no evidence or rashes, ulcers, nodules or scars. No evidence of discharge, scrotal masses or hernias  	Female: external genitalia without rashes, erythema, edema or ulcers. Vaginal mucosa pink and moist with clear vaginal discharge. No note of atrophy, erythema, ulcers, lesions, inflammation, abnormal discharge, nodules or masses in vaginal vault. Cervix is (punctate/stellate) without evidence of petichiae. Ovaries are non-palpable on physical exam.   Musculoskeletal: No edema, erythema, ecchymosis, atrophy or deformity. F No clubbing or cyanosis. No point tenderness to palpation.   Neuro: Cranial nerves II-XII intact. GCS 15. Moving all extremities. Strength is 5/5 arms and legs bilaterally. Sensation intact in extremities. gait steady   Skin: Warm, dry and intact without evidence of rashes, bruising, pallor, jaundice or cyanosis.   Psych: Mood and affect appropriate. General: Patient is well developed and well nourished. Patient is alert and oriented to person, place and date. Patient is sitting stretcher and appears in no acute distress.  HEENT: Head is normocephalic and atraumatic. Pupils are equal, round and reactive. Extraocular movements intact.   Lungs: Clear to auscultation bilaterally with equal chest expansion. No note of wheezes, rhonchi, rales. Equal chest expansion. No note of retractions.  Abdomen: ttp ruq.  Soft, non-distended without signs of masses, rebound or guarding. No note of hepatosplenomegaly. No CVA tenderness bilaterally. Negative McBurney's.  Neuro: GCS 15. Moving all extremities. Strength is 5/5 arms and legs bilaterally. Sensation intact in extremities. gait steady   Skin: Warm, dry and intact without evidence of rashes, bruising, pallor, jaundice or cyanosis.   Psych: Mood and affect appropriate.

## 2021-03-08 NOTE — ED PROVIDER NOTE - CLINICAL SUMMARY MEDICAL DECISION MAKING FREE TEXT BOX
31 y/o F PMHx asthma, here with RUQ abdominal pain and nausea. Pt veered away from diet last night and ate steak. Denies fever and chills. On physical exam, Pt appears well and nontoxic. Lungs are clear and heart rate is regular. She is tender to palpation in the RUQ. Plan for labs, medications, and ultrasound. Will reevaluate. 31 y/o F PMHx asthma, here with RUQ abdominal pain and nausea. Has planned cholecystectomy Dr. Blanton at the end of this month. Pt veered away from diet last night and ate steak. Denies fever and chills. On physical exam, Pt appears well and nontoxic. Lungs are clear and heart rate is regular. She is tender to palpation in the RUQ. Plan for labs, medications, and ultrasound. Will reevaluate. us shows increased gall baldder wall and cbd. elevated lft (slightly decreased from previous er visit). Mae consulted. 31 y/o F PMHx asthma, here with RUQ abdominal pain and nausea. Has planned cholecystectomy Dr. Cuellar at the end of this month. Pt veered away from diet last night and ate steak. Denies fever and chills. On physical exam, Pt appears well and nontoxic. Lungs are clear and heart rate is regular. She is tender to palpation in the RUQ. Plan for labs, medications, and ultrasound. Will reevaluate. us shows increased gall baldder wall and cbd. elevated lft (slightly decreased from previous er visit). Sugery consulted. plan for admission for surgery. pt agreeable to plan.

## 2021-03-08 NOTE — ED ADULT NURSE NOTE - OBJECTIVE STATEMENT
Pt is a 31 y/o female A&Ox4 in NAD ambulatory with steady gait c/o RUQ pain. Pt endorses nausea. Pt denies V/D, fever/chills. Pt states "scheduled for gallbladder surgery on 3/30." Pt talking in clear, full sentences, respirations even and unlabored.

## 2021-03-08 NOTE — ED ADULT TRIAGE NOTE - CHIEF COMPLAINT QUOTE
Pt presented w/ c/o URQ pain. Pt states is suppose to have surgery on 3/30/2021 for gallbladder removal but cannot take pain. Pt denies N/V/fever/chills

## 2021-03-08 NOTE — H&P ADULT - NSHPPHYSICALEXAM_GEN_ALL_CORE
Vital Signs Last 24 Hrs  T(C): 36.6 (08 Mar 2021 17:24), Max: 36.6 (08 Mar 2021 14:21)  T(F): 97.9 (08 Mar 2021 17:24), Max: 97.9 (08 Mar 2021 14:21)  HR: 79 (08 Mar 2021 17:24) (79 - 82)  BP: 102/55 (08 Mar 2021 17:24) (96/42 - 112/74)  BP(mean): --  RR: 18 (08 Mar 2021 17:24) (18 - 18)  SpO2: 100% (08 Mar 2021 17:24) (98% - 100%)        Physical Exam:  General: NAD, resting comfortably  HEENT: NC/AT, EOMI, normal hearing, no oral lesions, no LAD, neck supple  Pulmonary: normal resp effort, CTA-B  Cardiovascular: NSR, no murmurs  Abdominal: soft, ND/NT, no organomegaly  Extremities: WWP, normal strength, no clubbing/cyanosis/edema  Neuro: A/O x 3, CNs II-XII grossly intact, normal sensation, no focal deficits  Pulses: palpable distal pulses Vital Signs Last 24 Hrs  T(C): 36.6 (08 Mar 2021 17:24), Max: 36.6 (08 Mar 2021 14:21)  T(F): 97.9 (08 Mar 2021 17:24), Max: 97.9 (08 Mar 2021 14:21)  HR: 79 (08 Mar 2021 17:24) (79 - 82)  BP: 102/55 (08 Mar 2021 17:24) (96/42 - 112/74)  BP(mean): --  RR: 18 (08 Mar 2021 17:24) (18 - 18)  SpO2: 100% (08 Mar 2021 17:24) (98% - 100%)        Physical Exam:  General: NAD, resting comfortably  HEENT: NC/AT, EOMI, normal hearing, no oral lesions, no LAD, neck supple  Pulmonary: normal resp effort, CTA-B  Cardiovascular: NSR, no murmurs  Abdominal: soft, Mild RUQ tenderness, Christian sign is negative  Extremities: WWP, normal strength, no clubbing/cyanosis/edema  Neuro: A/O x 3, CNs II-XII grossly intact, normal sensation, no focal deficits  Pulses: palpable distal pulses

## 2021-03-09 ENCOUNTER — TRANSCRIPTION ENCOUNTER (OUTPATIENT)
Age: 31
End: 2021-03-09

## 2021-03-09 LAB
ALBUMIN SERPL ELPH-MCNC: 3.7 G/DL — SIGNIFICANT CHANGE UP (ref 3.3–5)
ALP SERPL-CCNC: 71 U/L — SIGNIFICANT CHANGE UP (ref 40–120)
ALT FLD-CCNC: 125 U/L — HIGH (ref 10–45)
ANION GAP SERPL CALC-SCNC: 9 MMOL/L — SIGNIFICANT CHANGE UP (ref 5–17)
AST SERPL-CCNC: SIGNIFICANT CHANGE UP U/L (ref 10–40)
BILIRUB SERPL-MCNC: 0.6 MG/DL — SIGNIFICANT CHANGE UP (ref 0.2–1.2)
BLD GP AB SCN SERPL QL: NEGATIVE — SIGNIFICANT CHANGE UP
BUN SERPL-MCNC: 8 MG/DL — SIGNIFICANT CHANGE UP (ref 7–23)
CALCIUM SERPL-MCNC: 8.4 MG/DL — SIGNIFICANT CHANGE UP (ref 8.4–10.5)
CHLORIDE SERPL-SCNC: 106 MMOL/L — SIGNIFICANT CHANGE UP (ref 96–108)
CO2 SERPL-SCNC: 20 MMOL/L — LOW (ref 22–31)
CREAT SERPL-MCNC: 0.54 MG/DL — SIGNIFICANT CHANGE UP (ref 0.5–1.3)
GLUCOSE SERPL-MCNC: 92 MG/DL — SIGNIFICANT CHANGE UP (ref 70–99)
HCT VFR BLD CALC: 31.8 % — LOW (ref 34.5–45)
HGB BLD-MCNC: 10 G/DL — LOW (ref 11.5–15.5)
MAGNESIUM SERPL-MCNC: 1.7 MG/DL — SIGNIFICANT CHANGE UP (ref 1.6–2.6)
MCHC RBC-ENTMCNC: 21.5 PG — LOW (ref 27–34)
MCHC RBC-ENTMCNC: 31.4 GM/DL — LOW (ref 32–36)
MCV RBC AUTO: 68.4 FL — LOW (ref 80–100)
NRBC # BLD: 0 /100 WBCS — SIGNIFICANT CHANGE UP (ref 0–0)
PHOSPHATE SERPL-MCNC: 2.5 MG/DL — SIGNIFICANT CHANGE UP (ref 2.5–4.5)
PLATELET # BLD AUTO: 286 K/UL — SIGNIFICANT CHANGE UP (ref 150–400)
POTASSIUM SERPL-MCNC: 4.5 MMOL/L — SIGNIFICANT CHANGE UP (ref 3.5–5.3)
POTASSIUM SERPL-SCNC: 4.5 MMOL/L — SIGNIFICANT CHANGE UP (ref 3.5–5.3)
PROT SERPL-MCNC: 6.4 G/DL — SIGNIFICANT CHANGE UP (ref 6–8.3)
RBC # BLD: 4.65 M/UL — SIGNIFICANT CHANGE UP (ref 3.8–5.2)
RBC # FLD: 14.9 % — HIGH (ref 10.3–14.5)
RH IG SCN BLD-IMP: POSITIVE — SIGNIFICANT CHANGE UP
SARS-COV-2 RNA SPEC QL NAA+PROBE: SIGNIFICANT CHANGE UP
SODIUM SERPL-SCNC: 135 MMOL/L — SIGNIFICANT CHANGE UP (ref 135–145)
WBC # BLD: 6.85 K/UL — SIGNIFICANT CHANGE UP (ref 3.8–10.5)
WBC # FLD AUTO: 6.85 K/UL — SIGNIFICANT CHANGE UP (ref 3.8–10.5)

## 2021-03-09 PROCEDURE — 99223 1ST HOSP IP/OBS HIGH 75: CPT

## 2021-03-09 RX ORDER — SODIUM,POTASSIUM PHOSPHATES 278-250MG
1 POWDER IN PACKET (EA) ORAL ONCE
Refills: 0 | Status: COMPLETED | OUTPATIENT
Start: 2021-03-09 | End: 2021-03-09

## 2021-03-09 RX ORDER — MAGNESIUM SULFATE 500 MG/ML
1 VIAL (ML) INJECTION ONCE
Refills: 0 | Status: COMPLETED | OUTPATIENT
Start: 2021-03-09 | End: 2021-03-09

## 2021-03-09 RX ORDER — INFLUENZA VIRUS VACCINE 15; 15; 15; 15 UG/.5ML; UG/.5ML; UG/.5ML; UG/.5ML
0.5 SUSPENSION INTRAMUSCULAR ONCE
Refills: 0 | Status: COMPLETED | OUTPATIENT
Start: 2021-03-09 | End: 2021-03-09

## 2021-03-09 RX ADMIN — HEPARIN SODIUM 5000 UNIT(S): 5000 INJECTION INTRAVENOUS; SUBCUTANEOUS at 21:02

## 2021-03-09 RX ADMIN — HEPARIN SODIUM 5000 UNIT(S): 5000 INJECTION INTRAVENOUS; SUBCUTANEOUS at 06:29

## 2021-03-09 RX ADMIN — BUDESONIDE AND FORMOTEROL FUMARATE DIHYDRATE 2 PUFF(S): 160; 4.5 AEROSOL RESPIRATORY (INHALATION) at 21:02

## 2021-03-09 RX ADMIN — BUDESONIDE AND FORMOTEROL FUMARATE DIHYDRATE 2 PUFF(S): 160; 4.5 AEROSOL RESPIRATORY (INHALATION) at 12:09

## 2021-03-09 RX ADMIN — Medication 100 GRAM(S): at 10:49

## 2021-03-09 RX ADMIN — HEPARIN SODIUM 5000 UNIT(S): 5000 INJECTION INTRAVENOUS; SUBCUTANEOUS at 14:12

## 2021-03-09 RX ADMIN — Medication 1 PACKET(S): at 10:49

## 2021-03-09 NOTE — CONSULT NOTE ADULT - ASSESSMENT
30YOF with history of thalassemia minor, asthma, biliary colic admitted for recurrent episode of biliary colic, without evidence of cholecystitis or choledocholithiasis.    Biliary colic  Has been an ongoing issue, had planned for lap arely at some time this month due to recurrent biliary colic. No evidence of cholecystitis. Symptoms resolved.  -defer timing of surgery to primary team  -pain control per primary team    Asthma  Takes Breo Ellipta 100-25mcg once daily and prn albuterol at home. Not in exacerbation.  -continue home Breo or formulary equivalent (Symbicort) and prn albuterol    Thalassemia minor  Chronic anemia, has never needed transfusion, currently near her baseline Hgb  -monitor H/H

## 2021-03-09 NOTE — CONSULT NOTE ADULT - SUBJECTIVE AND OBJECTIVE BOX
CC: RUQ abdominal pain    HPI  Per admission H&P:  "This is a 31 y/o F PMH: Asthma, Thalassemia Minor, PSH:  x4 last was , was supposed to get lap arely by Dr. Ramirez by the end of this month for recurrent biliary colic 2/2 to cholelithiasis, the patient had an MRI last February since she had transaminitis and fluctuating CBD diameter 0.5 to 0.9 and showed no choledocholithiasis she comes to ED today with history of 1xd RUQ pain after eating a meal of steak yesterday, the pain is radiating to the back and is episodic and lasts for a few minutes, associated with nausea and chills, no fever or vomiting, had only history of 1 blood transfusion in one of her c-sections, the last time she saw a hematologist was 10 years ago.  No changes in urine or stool color, no history of recent contact with a sick patient or travel outside the [facility].    On arrival to ED vitals are stable, afebrile, WBCs 9.5, mild transaminitis USS no cholecystitis, CBD 0.9 CM"    Patient admitted overnight to surgical service, started on IVF/pain meds, seen by me this morning. She confirms the above history. At the time of interview this morning her symptoms have overall resolved, no longer with abdominal pain, nausea/vomiting. No fever/chills, dizziness, lightheadedness. She is hopeful to be able to get surgery while she is here.    12 point ROS reviewed and negative except as otherwise stated in HPI and below    PAST MEDICAL & SURGICAL HISTORY:  Thalassemia  Asthma  H/O  section      SOCIAL HISTORY:  Tobacco: denies  Alcohol: denies  Illicit Drugs: denies  Living situation: with , children, mother  ADLs: independent  Student studying biology at [employer]    FAMILY HISTORY:  Father with DM  Maternal aunt with HTN  Otherwise negative for heart disease, cancer, strokes    ALLERGIES:  No Known Allergies      HOME MEDICATIONS:  Breo Ellipta 100 mcg-25 mcg/inh inhalation powder: 1 puff(s) inhaled once a day  Ventolin HFA 90 mcg/inh inhalation aerosol: 2 puff(s) inhaled every 6 hours, As Needed    Vital Signs Last 24 Hrs  T(F): 97.9 (09 Mar 2021 03:53), Max: 98 (08 Mar 2021 20:47)  HR: 85 (09 Mar 2021 03:53) (79 - 85)  BP: 111/69 (09 Mar 2021 03:53) (96/42 - 112/74)  RR: 17 (09 Mar 2021 03:53) (17 - 18)  SpO2: 98% (09 Mar 2021 03:53) (98% - 100%)    PHYSICAL EXAM:  GENERAL: pleasant, appropriate, no acute distress, well-groomed, well-developed  HEAD:  Atraumatic, Normocephalic  EYES: EOMI, PERRLA, conjunctiva and sclera clear  ENMT: No tonsillar erythema, exudates, or enlargement; Moist mucous membranes, Good dentition  NECK: Supple, No JVD  CHEST/LUNG: Clear to auscultation bilaterally; No rales, rhonchi, wheezing, or rubs  HEART: Regular rate and rhythm; S1/S2, No murmurs, rubs, or gallops  ABDOMEN: Soft, Nontender, Nondistended; Bowel sounds present. Negative Christian sign  VASCULAR: Normal pulses, Normal capillary refill  EXTREMITIES:  2+ Peripheral Pulses, No cyanosis, No edema  LYMPH: No lymphadenopathy noted  SKIN: Warm, Intact  PSYCH: Normal mood and affect  NERVOUS SYSTEM:  A/O x3, CN 2-12 intact, No focal deficits    LABS:                        10.0   6.85  )-----------( 286      ( 09 Mar 2021 08:20 )             31.8     03-08    138  |  107  |  12  ----------------------------<  120  4.5   |  22  |  0.61    Ca    8.8      08 Mar 2021 14:42    TPro  7.2  /  Alb  4.1  /  TBili  0.8  /  DBili  x   /  AST  120  /  ALT  75  /  AlkPhos  89  03-08      Urinalysis Basic - ( 08 Mar 2021 15:05 )    Color: Yellow / Appearance: Clear / SG: >=1.030 / pH: x  Gluc: x / Ketone: NEGATIVE  / Bili: Small / Urobili: 0.2 E.U./dL   Blood: x / Protein: Trace mg/dL / Nitrite: NEGATIVE   Leuk Esterase: NEGATIVE / RBC: < 5 /HPF / WBC < 5 /HPF   Sq Epi: x / Non Sq Epi: 5-10 /HPF / Bacteria: Present /HPF    RADIOLOGY & ADDITIONAL TESTS:  < from: US Abdomen Limited (21 @ 16:49) >  Findings:    Liver: Normal size. Normal echogenicity. There are no focal hepatic lesions.    Bile ducts: There is no intrahepatic biliary ductal dilatation. The common duct measures 0.9 cm, previously 0.5 cm on MR abdomen. The common bile duct tapers distally to measure 0.8 cm at the level of the pancreatic head.    Gallbladder: There is again cholelithiasis. No significant gallbladder wall thickening. No pericholecystic fluid. Evaluation for sonographic Christian's sign is limited due to recent analgesic administration.    Pancreas: The visualized portions appear normal.    Right kidney: The right kidney is normal in size, measuring 11.9 cm in length. There is normal right renal parenchymal thickness and echogenicity.  There is no right hydronephrosis.  No renal mass is identified. No renal stone is seen.    Ascites: There is no ascites in the right upper quadrant.    Vessels: The proximal portions of the aorta and inferior vena cava are unremarkable. Normal hepatopetal blood flow demonstrated within main portal vein. Hepatic veins are patent.    Impression:  1.  Cholelithiasis without definite evidence of acute cholecystitis.  2.  Interval increase in common bile duct now measuring 0.9 cm. Consider follow-up MRCP if clinically indicated.      < end of copied text >

## 2021-03-10 ENCOUNTER — RESULT REVIEW (OUTPATIENT)
Age: 31
End: 2021-03-10

## 2021-03-10 ENCOUNTER — TRANSCRIPTION ENCOUNTER (OUTPATIENT)
Age: 31
End: 2021-03-10

## 2021-03-10 LAB
ALBUMIN SERPL ELPH-MCNC: 3.7 G/DL — SIGNIFICANT CHANGE UP (ref 3.3–5)
ALP SERPL-CCNC: 73 U/L — SIGNIFICANT CHANGE UP (ref 40–120)
ALT FLD-CCNC: 107 U/L — HIGH (ref 10–45)
ANION GAP SERPL CALC-SCNC: 7 MMOL/L — SIGNIFICANT CHANGE UP (ref 5–17)
APTT BLD: 28.1 SEC — SIGNIFICANT CHANGE UP (ref 27.5–35.5)
AST SERPL-CCNC: 45 U/L — HIGH (ref 10–40)
BILIRUB SERPL-MCNC: 0.6 MG/DL — SIGNIFICANT CHANGE UP (ref 0.2–1.2)
BLD GP AB SCN SERPL QL: NEGATIVE — SIGNIFICANT CHANGE UP
BUN SERPL-MCNC: 2 MG/DL — LOW (ref 7–23)
CALCIUM SERPL-MCNC: 8.5 MG/DL — SIGNIFICANT CHANGE UP (ref 8.4–10.5)
CHLORIDE SERPL-SCNC: 108 MMOL/L — SIGNIFICANT CHANGE UP (ref 96–108)
CO2 SERPL-SCNC: 26 MMOL/L — SIGNIFICANT CHANGE UP (ref 22–31)
CREAT SERPL-MCNC: 0.62 MG/DL — SIGNIFICANT CHANGE UP (ref 0.5–1.3)
GLUCOSE SERPL-MCNC: 79 MG/DL — SIGNIFICANT CHANGE UP (ref 70–99)
HCT VFR BLD CALC: 32.5 % — LOW (ref 34.5–45)
HGB BLD-MCNC: 9.7 G/DL — LOW (ref 11.5–15.5)
INR BLD: 1.08 — SIGNIFICANT CHANGE UP (ref 0.88–1.16)
MAGNESIUM SERPL-MCNC: 1.9 MG/DL — SIGNIFICANT CHANGE UP (ref 1.6–2.6)
MCHC RBC-ENTMCNC: 20.5 PG — LOW (ref 27–34)
MCHC RBC-ENTMCNC: 29.8 GM/DL — LOW (ref 32–36)
MCV RBC AUTO: 68.7 FL — LOW (ref 80–100)
NRBC # BLD: 0 /100 WBCS — SIGNIFICANT CHANGE UP (ref 0–0)
PHOSPHATE SERPL-MCNC: 3 MG/DL — SIGNIFICANT CHANGE UP (ref 2.5–4.5)
PLATELET # BLD AUTO: 294 K/UL — SIGNIFICANT CHANGE UP (ref 150–400)
POTASSIUM SERPL-MCNC: 3.7 MMOL/L — SIGNIFICANT CHANGE UP (ref 3.5–5.3)
POTASSIUM SERPL-SCNC: 3.7 MMOL/L — SIGNIFICANT CHANGE UP (ref 3.5–5.3)
PROT SERPL-MCNC: 6.2 G/DL — SIGNIFICANT CHANGE UP (ref 6–8.3)
PROTHROM AB SERPL-ACNC: 12.9 SEC — SIGNIFICANT CHANGE UP (ref 10.6–13.6)
RBC # BLD: 4.73 M/UL — SIGNIFICANT CHANGE UP (ref 3.8–5.2)
RBC # FLD: 14.9 % — HIGH (ref 10.3–14.5)
RH IG SCN BLD-IMP: POSITIVE — SIGNIFICANT CHANGE UP
SODIUM SERPL-SCNC: 141 MMOL/L — SIGNIFICANT CHANGE UP (ref 135–145)
WBC # BLD: 6.2 K/UL — SIGNIFICANT CHANGE UP (ref 3.8–10.5)
WBC # FLD AUTO: 6.2 K/UL — SIGNIFICANT CHANGE UP (ref 3.8–10.5)

## 2021-03-10 PROCEDURE — 88300 SURGICAL PATH GROSS: CPT | Mod: 26,59

## 2021-03-10 PROCEDURE — 88304 TISSUE EXAM BY PATHOLOGIST: CPT | Mod: 26

## 2021-03-10 RX ORDER — POTASSIUM CHLORIDE 20 MEQ
10 PACKET (EA) ORAL
Refills: 0 | Status: DISCONTINUED | OUTPATIENT
Start: 2021-03-10 | End: 2021-03-10

## 2021-03-10 RX ORDER — HEPARIN SODIUM 5000 [USP'U]/ML
5000 INJECTION INTRAVENOUS; SUBCUTANEOUS EVERY 8 HOURS
Refills: 0 | Status: DISCONTINUED | OUTPATIENT
Start: 2021-03-10 | End: 2021-03-11

## 2021-03-10 RX ORDER — BUDESONIDE AND FORMOTEROL FUMARATE DIHYDRATE 160; 4.5 UG/1; UG/1
2 AEROSOL RESPIRATORY (INHALATION)
Refills: 0 | Status: DISCONTINUED | OUTPATIENT
Start: 2021-03-10 | End: 2021-03-11

## 2021-03-10 RX ORDER — ACETAMINOPHEN 500 MG
650 TABLET ORAL EVERY 6 HOURS
Refills: 0 | Status: DISCONTINUED | OUTPATIENT
Start: 2021-03-10 | End: 2021-03-11

## 2021-03-10 RX ORDER — OXYCODONE AND ACETAMINOPHEN 5; 325 MG/1; MG/1
1 TABLET ORAL EVERY 4 HOURS
Refills: 0 | Status: DISCONTINUED | OUTPATIENT
Start: 2021-03-10 | End: 2021-03-11

## 2021-03-10 RX ORDER — SODIUM CHLORIDE 9 MG/ML
1000 INJECTION, SOLUTION INTRAVENOUS
Refills: 0 | Status: DISCONTINUED | OUTPATIENT
Start: 2021-03-10 | End: 2021-03-10

## 2021-03-10 RX ORDER — MAGNESIUM SULFATE 500 MG/ML
1 VIAL (ML) INJECTION ONCE
Refills: 0 | Status: DISCONTINUED | OUTPATIENT
Start: 2021-03-10 | End: 2021-03-10

## 2021-03-10 RX ORDER — HYDROMORPHONE HYDROCHLORIDE 2 MG/ML
0.5 INJECTION INTRAMUSCULAR; INTRAVENOUS; SUBCUTANEOUS ONCE
Refills: 0 | Status: DISCONTINUED | OUTPATIENT
Start: 2021-03-10 | End: 2021-03-10

## 2021-03-10 RX ORDER — ONDANSETRON 8 MG/1
4 TABLET, FILM COATED ORAL EVERY 6 HOURS
Refills: 0 | Status: DISCONTINUED | OUTPATIENT
Start: 2021-03-10 | End: 2021-03-11

## 2021-03-10 RX ADMIN — SODIUM CHLORIDE 100 MILLILITER(S): 9 INJECTION, SOLUTION INTRAVENOUS at 16:18

## 2021-03-10 RX ADMIN — OXYCODONE AND ACETAMINOPHEN 1 TABLET(S): 5; 325 TABLET ORAL at 14:34

## 2021-03-10 RX ADMIN — OXYCODONE AND ACETAMINOPHEN 1 TABLET(S): 5; 325 TABLET ORAL at 14:08

## 2021-03-10 RX ADMIN — OXYCODONE AND ACETAMINOPHEN 1 TABLET(S): 5; 325 TABLET ORAL at 18:00

## 2021-03-10 RX ADMIN — HYDROMORPHONE HYDROCHLORIDE 0.5 MILLIGRAM(S): 2 INJECTION INTRAMUSCULAR; INTRAVENOUS; SUBCUTANEOUS at 12:45

## 2021-03-10 RX ADMIN — HYDROMORPHONE HYDROCHLORIDE 0.5 MILLIGRAM(S): 2 INJECTION INTRAMUSCULAR; INTRAVENOUS; SUBCUTANEOUS at 12:57

## 2021-03-10 RX ADMIN — OXYCODONE AND ACETAMINOPHEN 1 TABLET(S): 5; 325 TABLET ORAL at 19:10

## 2021-03-10 RX ADMIN — HEPARIN SODIUM 5000 UNIT(S): 5000 INJECTION INTRAVENOUS; SUBCUTANEOUS at 17:20

## 2021-03-10 NOTE — DISCHARGE NOTE PROVIDER - NSDCMRMEDTOKEN_GEN_ALL_CORE_FT
Breo Ellipta 100 mcg-25 mcg/inh inhalation powder: 1 puff(s) inhaled once a day  Pepcid 20 mg oral tablet: 1 tab(s) orally 2 times a day  Percocet 5/325 oral tablet: 1 tab(s) orally every 6 hours MDD:4  Ventolin HFA 90 mcg/inh inhalation aerosol: 2 puff(s) inhaled every 6 hours, As Needed  Zofran ODT 4 mg oral tablet, disintegratin tab(s) orally 3 times a day, As Needed -for nausea    Breo Ellipta 100 mcg-25 mcg/inh inhalation powder: 1 puff(s) inhaled once a day  Colace 100 mg oral capsule: 1 cap(s) orally 2 times a day as needed for constipation caused by percocet  oxycodone-acetaminophen 5 mg-325 mg oral tablet: 1 tab(s) orally every 6 hours as needed for severe pain MDD:4  Pepcid 20 mg oral tablet: 1 tab(s) orally 2 times a day  Ventolin HFA 90 mcg/inh inhalation aerosol: 2 puff(s) inhaled every 6 hours, As Needed

## 2021-03-10 NOTE — DISCHARGE NOTE PROVIDER - NSDCCPCAREPLAN_GEN_ALL_CORE_FT
PRINCIPAL DISCHARGE DIAGNOSIS  Diagnosis: Abdominal pain  Assessment and Plan of Treatment: - s/p lap arely

## 2021-03-10 NOTE — BRIEF OPERATIVE NOTE - NSICDXBRIEFPROCEDURE_GEN_ALL_CORE_FT
PROCEDURES:  Cholecystectomy, laparoscopic, without cholangiogram 10-Mar-2021 12:11:48  Gustavo Mendiola

## 2021-03-10 NOTE — DISCHARGE NOTE PROVIDER - NSDCFUADDAPPT_GEN_ALL_CORE_FT
Please follow up with Dr. Galvez in one week; you may call the office to make an appointment at your earliest convenience [phone].

## 2021-03-10 NOTE — DISCHARGE NOTE PROVIDER - HOSPITAL COURSE
31 y/o F PMH Asthma, Thalassemia Minor, PSH:  x4 last in , known symptomatic cholelithiasis, presented with 1xd RUQ pain with radiation to the back, afebrile. denies vomiting but endorses nausea + chills. Was supposed to undergo lap arely by the end of the month of March by Dr. crane. RUQ U/S significant for cholelithasis w/o PCF, gb wall thickening, CBD 9mm. In ED normal T.bili (.8), w/o leukocytosis. On 3/10 pt underwent laparoscopic cholecystectomy, gallbladder was non-perforated, non-gangrenous. Her postoperative course was unremarkable with advancement of diet, passing trial of void, and pain control. On day of discharge patient was stable to be d/c'd home.

## 2021-03-10 NOTE — BRIEF OPERATIVE NOTE - OPERATION/FINDINGS
Pt placed in reverse Trendelenburg w/ right side up. Omental attachments to GB were gently swept away. GB fundus retracted superiorly over dome of liver. Filmy adhesions between the GB & omentum/duo cauterized. GB infundibulum retracted laterally towards RLQ exposing Calot’s triangle. Critical view of safety obtained. Cystic duct and artery clipped and divided. Peritoneal attachments btw GB & liver bed  w/ electrocautery. Hemostasis achieved. No leakage of bile from cystic duct stump.

## 2021-03-10 NOTE — BRIEF OPERATIVE NOTE - NSICDXBRIEFPREOP_GEN_ALL_CORE_FT
PRE-OP DIAGNOSIS:  H/O acute cholecystitis 10-Mar-2021 12:12:03  Gustavo Mendiola   Pt  has PMH of COPD  C/o SOB and cough x few days  CXR- RLL infiltrate Pt has known PMH of PVD with chronic RLE ulcers at diff stage of healing and serosanguinous discharge  venous doppler- negative   f/u podiatry ( consulted)

## 2021-03-10 NOTE — DISCHARGE NOTE PROVIDER - CARE PROVIDER_API CALL
Connor Galvez  SURGERY  [address] [place] [address]  Phone: [phone]  Fax: [phone]  Follow Up Time:

## 2021-03-10 NOTE — DISCHARGE NOTE PROVIDER - NSDCFUADDINST_GEN_ALL_CORE_FT
General Discharge Instructions:  Please resume all regular home medications unless specifically advised not to take a particular medication. Also, please take any new medications as prescribed.  Please get plenty of rest, continue to ambulate several times per day, and drink adequate amounts of fluids. Avoid lifting weights greater than 5-10 lbs until you follow-up with your surgeon, who will instruct you further regarding activity restrictions.  Avoid driving or operating heavy machinery while taking pain medications.  Please follow-up with your surgeon and Primary Care Provider (PCP) as advised.  Incision Care:  *Please call your doctor if you have increased pain, swelling, redness, or drainage from the incision site.  *Avoid swimming and baths until your follow-up appointment.  *You may shower, and wash surgical incisions with a mild soap and warm water. Gently pat the area dry.  *If you have staples, they will be removed at your follow-up appointment.  *If you have steri-strips, they will fall off on their own. Please remove any remaining strips 7-10 days after surgery.    Warning Signs:  Please call your doctor if you experience the following:  *You experience new chest pain, pressure, squeezing or tightness.  *New or worsening cough, shortness of breath, or wheeze.  *If you are vomiting and cannot keep down fluids or your medications.  *You are getting dehydrated due to continued vomiting, diarrhea, or other reasons. Signs of dehydration include dry mouth, rapid heartbeat, or feeling dizzy or faint when standing.  *You see blood or dark/black material when you vomit or have a bowel movement.  *You experience burning when you urinate, have blood in your urine, or experience a discharge.  *Your pain is not improving within 8-12 hours or is not gone within 24 hours. Call or return immediately if your pain is getting worse, changes location, or moves to your chest or back.  *You have shaking chills, or fever greater than 101.5 degrees Fahrenheit or 38 degrees Celsius.  *Any change in your symptoms, or any new symptoms that concern you.   General Discharge Instructions:  Please resume all regular home medications unless specifically advised not to take a particular medication. Also, please take any new medications as prescribed. Take 2 tabs tylenol every 6 hours as needed for pain management. You have also been prescribed percocet for pain not controlled by tylenol. Take 1 tab as prescribed instead of tylenol for severe pain. Take prescribed stool softener (colace) to prevent constipation caused by percocet.    Please get plenty of rest, continue to ambulate several times per day, and drink adequate amounts of fluids. Avoid lifting weights greater than 5-10 lbs until you follow-up with your surgeon, who will instruct you further regarding activity restrictions.  Avoid driving or operating heavy machinery while taking pain medications.  Please follow-up with your surgeon and Primary Care Provider (PCP) as advised.  Incision Care:  *Please call your doctor if you have increased pain, swelling, redness, or drainage from the incision site.  *Avoid swimming and baths until your follow-up appointment.  *You may shower, and wash surgical incisions with a mild soap and warm water. Gently pat the area dry.  *If you have staples, they will be removed at your follow-up appointment.  *If you have steri-strips, they will fall off on their own. Please remove any remaining strips 7-10 days after surgery.    Warning Signs:  Please call your doctor if you experience the following:  *You experience new chest pain, pressure, squeezing or tightness.  *New or worsening cough, shortness of breath, or wheeze.  *If you are vomiting and cannot keep down fluids or your medications.  *You are getting dehydrated due to continued vomiting, diarrhea, or other reasons. Signs of dehydration include dry mouth, rapid heartbeat, or feeling dizzy or faint when standing.  *You see blood or dark/black material when you vomit or have a bowel movement.  *You experience burning when you urinate, have blood in your urine, or experience a discharge.  *Your pain is not improving within 8-12 hours or is not gone within 24 hours. Call or return immediately if your pain is getting worse, changes location, or moves to your chest or back.  *You have shaking chills, or fever greater than 101.5 degrees Fahrenheit or 38 degrees Celsius.  *Any change in your symptoms, or any new symptoms that concern you.

## 2021-03-11 ENCOUNTER — TRANSCRIPTION ENCOUNTER (OUTPATIENT)
Age: 31
End: 2021-03-11

## 2021-03-11 VITALS
RESPIRATION RATE: 18 BRPM | DIASTOLIC BLOOD PRESSURE: 81 MMHG | SYSTOLIC BLOOD PRESSURE: 117 MMHG | TEMPERATURE: 98 F | OXYGEN SATURATION: 99 % | HEART RATE: 71 BPM

## 2021-03-11 LAB
ALBUMIN SERPL ELPH-MCNC: 3.9 G/DL — SIGNIFICANT CHANGE UP (ref 3.3–5)
ALP SERPL-CCNC: 81 U/L — SIGNIFICANT CHANGE UP (ref 40–120)
ALT FLD-CCNC: 125 U/L — HIGH (ref 10–45)
ANION GAP SERPL CALC-SCNC: 8 MMOL/L — SIGNIFICANT CHANGE UP (ref 5–17)
AST SERPL-CCNC: 55 U/L — HIGH (ref 10–40)
BILIRUB SERPL-MCNC: 0.5 MG/DL — SIGNIFICANT CHANGE UP (ref 0.2–1.2)
BUN SERPL-MCNC: 2 MG/DL — LOW (ref 7–23)
CALCIUM SERPL-MCNC: 8.7 MG/DL — SIGNIFICANT CHANGE UP (ref 8.4–10.5)
CHLORIDE SERPL-SCNC: 106 MMOL/L — SIGNIFICANT CHANGE UP (ref 96–108)
CO2 SERPL-SCNC: 25 MMOL/L — SIGNIFICANT CHANGE UP (ref 22–31)
CREAT SERPL-MCNC: 0.59 MG/DL — SIGNIFICANT CHANGE UP (ref 0.5–1.3)
GLUCOSE SERPL-MCNC: 81 MG/DL — SIGNIFICANT CHANGE UP (ref 70–99)
HCT VFR BLD CALC: 32.4 % — LOW (ref 34.5–45)
HGB BLD-MCNC: 10.1 G/DL — LOW (ref 11.5–15.5)
MAGNESIUM SERPL-MCNC: 1.8 MG/DL — SIGNIFICANT CHANGE UP (ref 1.6–2.6)
MCHC RBC-ENTMCNC: 20.8 PG — LOW (ref 27–34)
MCHC RBC-ENTMCNC: 31.2 GM/DL — LOW (ref 32–36)
MCV RBC AUTO: 66.8 FL — LOW (ref 80–100)
NRBC # BLD: 0 /100 WBCS — SIGNIFICANT CHANGE UP (ref 0–0)
PHOSPHATE SERPL-MCNC: 2.7 MG/DL — SIGNIFICANT CHANGE UP (ref 2.5–4.5)
PLATELET # BLD AUTO: 299 K/UL — SIGNIFICANT CHANGE UP (ref 150–400)
POTASSIUM SERPL-MCNC: 3.5 MMOL/L — SIGNIFICANT CHANGE UP (ref 3.5–5.3)
POTASSIUM SERPL-SCNC: 3.5 MMOL/L — SIGNIFICANT CHANGE UP (ref 3.5–5.3)
PROT SERPL-MCNC: 6.7 G/DL — SIGNIFICANT CHANGE UP (ref 6–8.3)
RBC # BLD: 4.85 M/UL — SIGNIFICANT CHANGE UP (ref 3.8–5.2)
RBC # FLD: 14.9 % — HIGH (ref 10.3–14.5)
SODIUM SERPL-SCNC: 139 MMOL/L — SIGNIFICANT CHANGE UP (ref 135–145)
WBC # BLD: 7.98 K/UL — SIGNIFICANT CHANGE UP (ref 3.8–10.5)
WBC # FLD AUTO: 7.98 K/UL — SIGNIFICANT CHANGE UP (ref 3.8–10.5)

## 2021-03-11 PROCEDURE — 85610 PROTHROMBIN TIME: CPT

## 2021-03-11 PROCEDURE — 93005 ELECTROCARDIOGRAM TRACING: CPT

## 2021-03-11 PROCEDURE — 76705 ECHO EXAM OF ABDOMEN: CPT

## 2021-03-11 PROCEDURE — 86901 BLOOD TYPING SEROLOGIC RH(D): CPT

## 2021-03-11 PROCEDURE — U0003: CPT

## 2021-03-11 PROCEDURE — 94640 AIRWAY INHALATION TREATMENT: CPT

## 2021-03-11 PROCEDURE — 83690 ASSAY OF LIPASE: CPT

## 2021-03-11 PROCEDURE — 96361 HYDRATE IV INFUSION ADD-ON: CPT

## 2021-03-11 PROCEDURE — 36415 COLL VENOUS BLD VENIPUNCTURE: CPT

## 2021-03-11 PROCEDURE — 81001 URINALYSIS AUTO W/SCOPE: CPT

## 2021-03-11 PROCEDURE — 96375 TX/PRO/DX INJ NEW DRUG ADDON: CPT

## 2021-03-11 PROCEDURE — 83735 ASSAY OF MAGNESIUM: CPT

## 2021-03-11 PROCEDURE — 88300 SURGICAL PATH GROSS: CPT

## 2021-03-11 PROCEDURE — 99285 EMERGENCY DEPT VISIT HI MDM: CPT | Mod: 25

## 2021-03-11 PROCEDURE — U0005: CPT

## 2021-03-11 PROCEDURE — 80053 COMPREHEN METABOLIC PANEL: CPT

## 2021-03-11 PROCEDURE — 84702 CHORIONIC GONADOTROPIN TEST: CPT

## 2021-03-11 PROCEDURE — 86900 BLOOD TYPING SEROLOGIC ABO: CPT

## 2021-03-11 PROCEDURE — 88304 TISSUE EXAM BY PATHOLOGIST: CPT

## 2021-03-11 PROCEDURE — 96374 THER/PROPH/DIAG INJ IV PUSH: CPT

## 2021-03-11 PROCEDURE — 84100 ASSAY OF PHOSPHORUS: CPT

## 2021-03-11 PROCEDURE — 85027 COMPLETE CBC AUTOMATED: CPT

## 2021-03-11 PROCEDURE — 86850 RBC ANTIBODY SCREEN: CPT

## 2021-03-11 PROCEDURE — 85730 THROMBOPLASTIN TIME PARTIAL: CPT

## 2021-03-11 PROCEDURE — 85025 COMPLETE CBC W/AUTO DIFF WBC: CPT

## 2021-03-11 RX ORDER — DOCUSATE SODIUM 100 MG
1 CAPSULE ORAL
Qty: 14 | Refills: 0
Start: 2021-03-11 | End: 2021-03-17

## 2021-03-11 RX ORDER — POTASSIUM CHLORIDE 20 MEQ
40 PACKET (EA) ORAL ONCE
Refills: 0 | Status: COMPLETED | OUTPATIENT
Start: 2021-03-11 | End: 2021-03-11

## 2021-03-11 RX ADMIN — Medication 40 MILLIEQUIVALENT(S): at 11:09

## 2021-03-11 RX ADMIN — HEPARIN SODIUM 5000 UNIT(S): 5000 INJECTION INTRAVENOUS; SUBCUTANEOUS at 01:34

## 2021-03-11 RX ADMIN — BUDESONIDE AND FORMOTEROL FUMARATE DIHYDRATE 2 PUFF(S): 160; 4.5 AEROSOL RESPIRATORY (INHALATION) at 05:35

## 2021-03-11 RX ADMIN — HEPARIN SODIUM 5000 UNIT(S): 5000 INJECTION INTRAVENOUS; SUBCUTANEOUS at 09:19

## 2021-03-11 RX ADMIN — OXYCODONE AND ACETAMINOPHEN 1 TABLET(S): 5; 325 TABLET ORAL at 02:45

## 2021-03-11 RX ADMIN — OXYCODONE AND ACETAMINOPHEN 1 TABLET(S): 5; 325 TABLET ORAL at 01:46

## 2021-03-11 RX ADMIN — OXYCODONE AND ACETAMINOPHEN 1 TABLET(S): 5; 325 TABLET ORAL at 09:10

## 2021-03-11 RX ADMIN — OXYCODONE AND ACETAMINOPHEN 1 TABLET(S): 5; 325 TABLET ORAL at 15:30

## 2021-03-11 RX ADMIN — OXYCODONE AND ACETAMINOPHEN 1 TABLET(S): 5; 325 TABLET ORAL at 14:30

## 2021-03-11 RX ADMIN — OXYCODONE AND ACETAMINOPHEN 1 TABLET(S): 5; 325 TABLET ORAL at 08:12

## 2021-03-11 NOTE — DISCHARGE NOTE NURSING/CASE MANAGEMENT/SOCIAL WORK - NSDCFUADDAPPT_GEN_ALL_CORE_FT
Please follow up with Dr. Galvez in one week; you may call the office to make an appointment at your earliest convenience [phone].

## 2021-03-11 NOTE — DISCHARGE NOTE NURSING/CASE MANAGEMENT/SOCIAL WORK - PATIENT PORTAL LINK FT
You can access the FollowMyHealth Patient Portal offered by [facility] by registering at the following website: [email or web address]. By joining [employer]’s FollowMyHealth portal, you will also be able to view your health information using other applications (apps) compatible with our system.

## 2021-03-11 NOTE — PROGRESS NOTE ADULT - SUBJECTIVE AND OBJECTIVE BOX
Post op day # 1 s/p jackie mcgee   SUBJECTIVE: Patient seen and examined bedside. Patient reports that she is doing well and her pain is controlled. Patient denies passing flatus or having bowel movement. Patient denies nausea and vomiting. Patient is tolerating clear liquid diet.    heparin   Injectable 5000 Unit(s) SubCutaneous every 8 hours      Vital Signs Last 24 Hrs  T(C): 36.6 (11 Mar 2021 04:55), Max: 37 (10 Mar 2021 12:32)  T(F): 97.9 (11 Mar 2021 04:55), Max: 98.6 (10 Mar 2021 12:32)  HR: 78 (11 Mar 2021 04:55) (65 - 87)  BP: 117/78 (11 Mar 2021 04:55) (107/66 - 117/78)  BP(mean): 83 (10 Mar 2021 13:32) (80 - 83)  RR: 16 (11 Mar 2021 04:55) (13 - 19)  SpO2: 98% (11 Mar 2021 04:55) (96% - 100%)  I&O's Detail    10 Mar 2021 07:01  -  11 Mar 2021 07:00  --------------------------------------------------------  IN:    Lactated Ringers: 300 mL  Total IN: 300 mL    OUT:    Voided (mL): 2700 mL  Total OUT: 2700 mL    Total NET: -2400 mL          General: NAD, resting comfortably in bed  C/V: NSR  Pulm: Nonlabored breathing, no respiratory distress  Abd: soft, nontender, nondistended, surgical incisions clean, dry ,and intact.  Extrem: WWP, no edema, SCDs in place        LABS:                        9.7    6.20  )-----------( 294      ( 10 Mar 2021 08:13 )             32.5     03-10    141  |  108  |  2<L>  ----------------------------<  79  3.7   |  26  |  0.62    Ca    8.5      10 Mar 2021 08:13  Phos  3.0     03-10  Mg     1.9     03-10    TPro  6.2  /  Alb  3.7  /  TBili  0.6  /  DBili  x   /  AST  45<H>  /  ALT  107<H>  /  AlkPhos  73  03-10    PT/INR - ( 10 Mar 2021 08:13 )   PT: 12.9 sec;   INR: 1.08          PTT - ( 10 Mar 2021 08:13 )  PTT:28.1 sec      RADIOLOGY & ADDITIONAL STUDIES:  
  SUBJECTIVE: Feeling well, no complaints. Patient seen and examined bedside by chief resident.    heparin   Injectable 5000 Unit(s) SubCutaneous every 8 hours    MEDICATIONS  (PRN):  acetaminophen   Tablet .. 650 milliGRAM(s) Oral every 6 hours PRN Temp greater or equal to 38C (100.4F), Moderate Pain (4 - 6)  ALBUTerol    90 MICROgram(s) HFA Inhaler 2 Puff(s) Inhalation every 6 hours PRN Bronchospasm  ondansetron Injectable 4 milliGRAM(s) IV Push every 6 hours PRN Nausea and/or Vomiting      I&O's Detail    09 Mar 2021 07:01  -  10 Mar 2021 07:00  --------------------------------------------------------  IN:    Lactated Ringers: 950 mL  Total IN: 950 mL    OUT:    Voided (mL): 1050 mL  Total OUT: 1050 mL    Total NET: -100 mL          Vital Signs Last 24 Hrs  T(C): 36.6 (10 Mar 2021 05:09), Max: 36.8 (09 Mar 2021 09:42)  T(F): 97.8 (10 Mar 2021 05:09), Max: 98.3 (09 Mar 2021 09:42)  HR: 78 (10 Mar 2021 05:09) (75 - 84)  BP: 111/69 (10 Mar 2021 05:09) (100/71 - 112/73)  BP(mean): --  RR: 18 (10 Mar 2021 05:09) (17 - 18)  SpO2: 100% (10 Mar 2021 05:09) (97% - 100%)    General: NAD, resting comfortably in bed  C/V: NSR  Pulm: Nonlabored breathing, no respiratory distress  Abd: soft, NT/ND  Extrem:  SCDs in place    LABS:                        10.0   6.85  )-----------( 286      ( 09 Mar 2021 08:20 )             31.8     03-09    135  |  106  |  8   ----------------------------<  92  4.5   |  20<L>  |  0.54    Ca    8.4      09 Mar 2021 08:20  Phos  2.5     03-09  Mg     1.7     03-09    TPro  6.4  /  Alb  3.7  /  TBili  0.6  /  DBili  x   /  AST  see note  /  ALT  125<H>  /  AlkPhos  71  03-09      Urinalysis Basic - ( 08 Mar 2021 15:05 )    Color: Yellow / Appearance: Clear / SG: >=1.030 / pH: x  Gluc: x / Ketone: NEGATIVE  / Bili: Small / Urobili: 0.2 E.U./dL   Blood: x / Protein: Trace mg/dL / Nitrite: NEGATIVE   Leuk Esterase: NEGATIVE / RBC: < 5 /HPF / WBC < 5 /HPF   Sq Epi: x / Non Sq Epi: 5-10 /HPF / Bacteria: Present /HPF      
INTERVAL HPI/OVERNIGHT EVENTS: admitted for symptomatic cholelithiasis, covid pcr negative    SUBJECTIVE:  pt seen at bedside, pain resolved. denies nausea and vomiting    MEDICATIONS  (STANDING):  budesonide  80 MICROgram(s)/formoterol 4.5 MICROgram(s) Inhaler 2 Puff(s) Inhalation two times a day  heparin   Injectable 5000 Unit(s) SubCutaneous every 8 hours  lactated ringers. 1000 milliLiter(s) (95 mL/Hr) IV Continuous <Continuous>    MEDICATIONS  (PRN):  acetaminophen   Tablet .. 650 milliGRAM(s) Oral every 6 hours PRN Temp greater or equal to 38C (100.4F), Moderate Pain (4 - 6)  ALBUTerol    90 MICROgram(s) HFA Inhaler 2 Puff(s) Inhalation every 6 hours PRN Bronchospasm  HYDROmorphone  Injectable 0.5 milliGRAM(s) IV Push every 4 hours PRN Severe Pain (7 - 10)  ondansetron Injectable 4 milliGRAM(s) IV Push every 6 hours PRN Nausea and/or Vomiting      Vital Signs Last 24 Hrs  T(C): 36.6 (09 Mar 2021 03:53), Max: 36.7 (08 Mar 2021 20:47)  T(F): 97.9 (09 Mar 2021 03:53), Max: 98 (08 Mar 2021 20:47)  HR: 85 (09 Mar 2021 03:53) (79 - 85)  BP: 111/69 (09 Mar 2021 03:53) (96/42 - 112/74)  BP(mean): --  RR: 17 (09 Mar 2021 03:53) (17 - 18)  SpO2: 98% (09 Mar 2021 03:53) (98% - 100%)    PHYSICAL EXAM:      Constitutional: A&Ox3    Respiratory: non labored breathing, no respiratory distress    Cardiovascular: NSR, RRR    Gastrointestinal: soft, nontender, nondistended    Extremities: (-) edema                  I&O's Detail      LABS:                        10.6   9.45  )-----------( 312      ( 08 Mar 2021 14:42 )             35.9     03-08    138  |  107  |  12  ----------------------------<  120<H>  4.5   |  22  |  0.61    Ca    8.8      08 Mar 2021 14:42    TPro  7.2  /  Alb  4.1  /  TBili  0.8  /  DBili  x   /  AST  120<H>  /  ALT  75<H>  /  AlkPhos  89  03-08      Urinalysis Basic - ( 08 Mar 2021 15:05 )    Color: Yellow / Appearance: Clear / SG: >=1.030 / pH: x  Gluc: x / Ketone: NEGATIVE  / Bili: Small / Urobili: 0.2 E.U./dL   Blood: x / Protein: Trace mg/dL / Nitrite: NEGATIVE   Leuk Esterase: NEGATIVE / RBC: < 5 /HPF / WBC < 5 /HPF   Sq Epi: x / Non Sq Epi: 5-10 /HPF / Bacteria: Present /HPF        RADIOLOGY & ADDITIONAL STUDIES:
POST-OPERATIVE NOTE    Procedure: lap cholecystectomy    Diagnosis/Indication: cholecystitis    Surgeon: Lenny    S: Pt comfortable, c/o some abd pain. Denies CP, SOB, calf tenderness. Pain controlled with medication. Denies nausea, vomiting.    O:  T(C): 37 (03-10-21 @ 12:32), Max: 37 (03-10-21 @ 12:32)  T(F): 98.6 (03-10-21 @ 12:32), Max: 98.6 (03-10-21 @ 12:32)  HR: 72 (03-10-21 @ 13:32) (67 - 87)  BP: 113/65 (03-10-21 @ 13:32) (107/66 - 113/65)  RR: 15 (03-10-21 @ 13:32) (13 - 18)  SpO2: 100% (03-10-21 @ 13:32) (100% - 100%)  Wt(kg): --                        9.7    6.20  )-----------( 294      ( 10 Mar 2021 08:13 )             32.5     03-10    141  |  108  |  2<L>  ----------------------------<  79  3.7   |  26  |  0.62    Ca    8.5      10 Mar 2021 08:13  Phos  3.0     03-10  Mg     1.9     03-10    TPro  6.2  /  Alb  3.7  /  TBili  0.6  /  DBili  x   /  AST  45<H>  /  ALT  107<H>  /  AlkPhos  73  03-10      Gen: NAD, resting comfortably in bed  C/V: NSR  Pulm: Nonlabored breathing, no respiratory distress  Abd: soft, NT/ND, incisions CDI  Extrem: no calf tenderness, SCDs in place

## 2021-03-11 NOTE — PROGRESS NOTE ADULT - ASSESSMENT
31 y/o F PMH Asthma, Thalassemia Minor, PSH:  x4 last in , known symptomatic cholelithiasis, presents with 1xd RUQ pain with radiation to the back, afebrile. denies vomiting but endorses nausea + chills. Was supposed to undergo lap arely by the end of the month by Dr. crane. RUQ U/S significant for cholelithasis w/o PCF, gb wall thickening, CBD 9mm. In ED normal T.bili (.8), w/o leukocytosis now s/p laparoscopic cholecystectomy    - CLD/IVF (advance diet as tolerated)  - Pain/nausea control   - SQH/SCD  - OOBA/IS  - Home meds as appropriate   - AM labs  
29 y/o F PMH Asthma, Thalassemia Minor, PSH:  x4 last in , known symptomatic cholelithiasis, presents with 1xd RUQ pain with radiation to the back, afebrile. denies vomiting but endorses nausea + chills. Was supposed to undergo lap arely by the end of the month by Dr. crane. RUQ U/S significant for cholelithasis w/o PCF, gb wall thickening, CBD 9mm. In ED normal T.bili (.8), w/o leukocytosis     - NPO/IVF   - Pain + nausea control   - SQH/SCD  - AM labs  - Home meds as appropriate   - discuss with attending need for OR inpatient vs outpatient  
31 y/o F PMH Asthma, Thalassemia Minor, PSH:  x4 last in , known symptomatic cholelithiasis, presents with 1xd RUQ pain with radiation to the back, afebrile. denies vomiting but endorses nausea + chills. Was supposed to undergo lap arely by the end of the month by Dr. crane. RUQ U/S significant for cholelithasis w/o PCF, gb wall thickening, CBD 9mm. In ED normal T.bili (.8), w/o leukocytosis now s/p laparoscopic cholecystectomy    - LFD  - Pain/nausea control   - SQH/SCD  - OOBA/IS  - Home meds as appropriate   - AM labs  
 29 y/o F PMH Asthma, Thalassemia Minor, PSH:  x4 last in , known symptomatic cholelithiasis, presents with 1xd RUQ pain with radiation to the back, afebrile. denies vomiting but endorses nausea + chills. Was supposed to undergo lap arely by the end of the month by Dr. crane. RUQ U/S significant for cholelithasis w/o PCF, gb wall thickening, CBD 9mm. In ED normal T.bili (.8), w/o leukocytosis     - NPO/IVF   - Pain + nausea control   - SQH/SCD  - AM labs  - Home meds as appropriate   - OR 3/10

## 2021-03-13 ENCOUNTER — LABORATORY RESULT (OUTPATIENT)
Age: 31
End: 2021-03-13

## 2021-03-16 ENCOUNTER — APPOINTMENT (OUTPATIENT)
Dept: PULMONOLOGY | Facility: CLINIC | Age: 31
End: 2021-03-16
Payer: MEDICAID

## 2021-03-16 VITALS
TEMPERATURE: 97.6 F | OXYGEN SATURATION: 98 % | HEART RATE: 85 BPM | WEIGHT: 138 LBS | DIASTOLIC BLOOD PRESSURE: 70 MMHG | SYSTOLIC BLOOD PRESSURE: 90 MMHG | BODY MASS INDEX: 26.95 KG/M2

## 2021-03-16 LAB — SURGICAL PATHOLOGY STUDY: SIGNIFICANT CHANGE UP

## 2021-03-16 PROCEDURE — 99213 OFFICE O/P EST LOW 20 MIN: CPT | Mod: 25

## 2021-03-16 PROCEDURE — 94010 BREATHING CAPACITY TEST: CPT

## 2021-03-16 PROCEDURE — 99072 ADDL SUPL MATRL&STAF TM PHE: CPT

## 2021-03-16 NOTE — HISTORY OF PRESENT ILLNESS
[TextBox_4] : 2/26/18: Asked to evaluate patient by Dr Moore for asthma. She was seen at [facility] a month ago on Flovent and was increased to Flovent 220mcg 2 puffs bid + Serevent bid. Misses 1-2x a week. On this regimen she needs her rescue less, 1-2x a week. Sx are dyspnea and wheeze. No more nocturnal awakenings. Has been hospitalized in the [facility] 3-4x a year, none in the last year. Maybe 1 course steroids last year. 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 Traveling to [facility] soon.\par \par 3/26/18: Using Breo daily and no longer needing rescue, no nocturnal awakenings, no limitations. On montelukast. Traveling to [facility] in about 3 weeks.\par \par 5/17/18: Went to the [facility] and got her 4 girls and is stressed. Running low on Ventolin. Needed neb last night. She returned a week ago or so - she can't remember and refers to her small daughter. Needing albuterol qod or so. Taking Breo every night, misses it twice a week. Still on montelukast. No prednisone since seeing me last.\par \par 7/5/18 [[place]]: Patient returned for a scheduled visit. She has not picked up her Arnuity but has not had symptoms since her last visit. She states that she has not used her rescue inhaler at all, and is fully compliant with her Breo Ellipta. She requested a new nebulizer though as her last one broke down and she would like to have one in case she experiences acute respiratory symptoms. \par  \par 1/31/20: Doing well on Breo, barely needs albuterol, no nocturnal symptoms, no activity limitation. Got back last time she ran out of Breo.\par \par 3/16/21: Had lap arely 6 days ago, is ok. Asthma has been ok but she gets symptomatic if she stops using it. She does have it now and is using it. Did not have Covid. No exacerbations. Uses albuterol less than weekly, nocturnal awakenings. Has not had Covid vax.

## 2021-03-16 NOTE — ASSESSMENT
[FreeTextEntry1] : Data reviewed:\par \par PA/lat CXR [place] 12/28/17: clear lungs\par \par Ivette 2/26/18: severe obstruction, FEV1 49% / FENO 93\par PFT 2/26/18: moderate obstruction, FEV1 57%, sig response to IBD, normal volumes and DLCO\par Ivette 3/26/18: mod obstruction, FEV1 64%\par Ivette 5/17/18: mod obstruction, FEV1 69% / FENO 79\par Kennard 7/5/18: mod obstruction, FEV1 69%\par Ivette 1/31/20: mild obstruction, FEV1 72%\par Ivette 3/16/21: mod-sev obstruction, FEV1 59%\par \par Impression:\par Moderate persistent asthma, well-controlled\par \par Plan:\par Drop in ivette today may be due to some postop discomfort; no change in symptoms.\par Continue Breo. Do not stop Breo.\par Recommend Covid vax; she is eligible given severity of asthma.\par Follow up at least annually.

## 2021-03-18 DIAGNOSIS — K80.20 CALCULUS OF GALLBLADDER WITHOUT CHOLECYSTITIS WITHOUT OBSTRUCTION: ICD-10-CM

## 2021-03-18 DIAGNOSIS — K80.00 CALCULUS OF GALLBLADDER WITH ACUTE CHOLECYSTITIS WITHOUT OBSTRUCTION: ICD-10-CM

## 2021-03-18 DIAGNOSIS — J45.909 UNSPECIFIED ASTHMA, UNCOMPLICATED: ICD-10-CM

## 2021-03-18 DIAGNOSIS — D56.3 THALASSEMIA MINOR: ICD-10-CM

## 2021-04-05 ENCOUNTER — RX RENEWAL (OUTPATIENT)
Age: 31
End: 2021-04-05

## 2021-06-15 ENCOUNTER — APPOINTMENT (OUTPATIENT)
Dept: ENDOCRINOLOGY | Facility: CLINIC | Age: 31
End: 2021-06-15

## 2021-07-27 VITALS
HEIGHT: 60 IN | DIASTOLIC BLOOD PRESSURE: 80 MMHG | TEMPERATURE: 99 F | OXYGEN SATURATION: 99 % | HEART RATE: 107 BPM | SYSTOLIC BLOOD PRESSURE: 133 MMHG | WEIGHT: 106.92 LBS | RESPIRATION RATE: 18 BRPM

## 2021-07-27 PROCEDURE — 76801 OB US < 14 WKS SINGLE FETUS: CPT | Mod: 26

## 2021-07-27 PROCEDURE — 99285 EMERGENCY DEPT VISIT HI MDM: CPT

## 2021-07-27 NOTE — ED ADULT NURSE NOTE - HISTORY OF COVID-19 VACCINATION
case management informed me that patient would not be able to afford brilinta due to lack of insurance  will switxh to plavix and e aurelianoibe to [facility]
No

## 2021-07-27 NOTE — ED PROVIDER NOTE - PROGRESS NOTE DETAILS
[place]: Hgb 10.8 (has hx anemia), k hemolyzed, hcg 843, other labs grossly wnl. US showing "1.  Early developing, probable  pregnancy. 2.  Gestational sac corresponds to an ultrasound age of 5 weeks 0 days. No embryo is visualized, which can be a normal finding in early pregnancy." Pt remains asymptomatic. GYN consulted. Updated pt. Klepfish: rediscussed w/ GYN, requesting admission for further care.

## 2021-07-27 NOTE — ED ADULT TRIAGE NOTE - OTHER COMPLAINTS
, possibly 8 weeks pregnant, seen by GYN and was told to come to ED to r/o ectopic, ultrasound done outpatient. no vag bleed, no cramping

## 2021-07-27 NOTE — ED PROVIDER NOTE - OBJECTIVE STATEMENT
28F PMH Asthma, Thalassemia Minor, hyperthyroidism (methimazole) PSxH cholecystectomy,  p/w ?pregnancy. Pt is , LMP 6/3, did have minimal bleeding  as well. Pt has mild intermittent nausea and missed her period so went to Dr. Lee today. Pt states that she had +urine preg, had blood test sent w/ unknown results. Also had US performed but was told they couldn't see anything. Pt states that she was referred to ED bec "they have better ultrasounds" and to "evaluate ectopic." Pt is currently completely asymptomatic.   Denies any abd pain, vaginal bleeding, SOB/CP, HA, f/c, diarrhea, urinary complaints, rashes, focal weakness/numbness, lightheaded.

## 2021-07-27 NOTE — ED ADULT NURSE NOTE - NSIMPLEMENTINTERV_GEN_ALL_ED
Implemented All Universal Safety Interventions:  [place] to call system. Call bell, personal items and telephone within reach. Instruct patient to call for assistance. Room bathroom lighting operational. Non-slip footwear when patient is off stretcher. Physically safe environment: no spills, clutter or unnecessary equipment. Stretcher in lowest position, wheels locked, appropriate side rails in place.

## 2021-07-27 NOTE — ED PROVIDER NOTE - CLINICAL SUMMARY MEDICAL DECISION MAKING FREE TEXT BOX
28F PMH Asthma, Thalassemia Minor, hyperthyroidism (methimazole) PSxH cholecystectomy,  p/w ?pregnancy. Pt is , LMP 6/3, did have minimal bleeding  as well. Pt has mild intermittent nausea and missed her period so went to Dr. Lee today. Pt states that she had +urine preg, had blood test sent w/ unknown results. Also had US performed but was told they couldn't see anything. Pt states that she was referred to ED bec "they have better ultrasounds" and to "evaluate ectopic." Pt is currently completely asymptomatic.   Mild triage tachycardia self resolved, other vitals wnl. Exam as above.  ddx: Eval IUP. Clinically not ruptured ectopic.   Labs, US, reassess.

## 2021-07-27 NOTE — ED ADULT NURSE NOTE - OBJECTIVE STATEMENT
Pt, who reportedly is ~8wks pregnant, presents to ER with no complaints, only to have US to R/O ectopic pregnance

## 2021-07-28 ENCOUNTER — ASOB RESULT (OUTPATIENT)
Age: 31
End: 2021-07-28

## 2021-07-28 ENCOUNTER — TRANSCRIPTION ENCOUNTER (OUTPATIENT)
Age: 31
End: 2021-07-28

## 2021-07-28 ENCOUNTER — INPATIENT (INPATIENT)
Facility: HOSPITAL | Age: 31
LOS: 0 days | Discharge: HOME | DRG: 833 | End: 2021-07-28
Attending: OBSTETRICS & GYNECOLOGY | Admitting: OBSTETRICS & GYNECOLOGY
Payer: MEDICAID

## 2021-07-28 ENCOUNTER — APPOINTMENT (OUTPATIENT)
Dept: ANTEPARTUM | Facility: CLINIC | Age: 31
End: 2021-07-28
Payer: MEDICAID

## 2021-07-28 VITALS
DIASTOLIC BLOOD PRESSURE: 63 MMHG | OXYGEN SATURATION: 98 % | TEMPERATURE: 98 F | RESPIRATION RATE: 17 BRPM | HEART RATE: 97 BPM | SYSTOLIC BLOOD PRESSURE: 97 MMHG

## 2021-07-28 DIAGNOSIS — Z98.891 HISTORY OF UTERINE SCAR FROM PREVIOUS SURGERY: Chronic | ICD-10-CM

## 2021-07-28 LAB
ALBUMIN SERPL ELPH-MCNC: 4.1 G/DL — SIGNIFICANT CHANGE UP (ref 3.3–5)
ALBUMIN SERPL ELPH-MCNC: 4.3 G/DL — SIGNIFICANT CHANGE UP (ref 3.3–5)
ALP SERPL-CCNC: 66 U/L — SIGNIFICANT CHANGE UP (ref 40–120)
ALP SERPL-CCNC: SIGNIFICANT CHANGE UP (ref 40–120)
ALT FLD-CCNC: 14 U/L — SIGNIFICANT CHANGE UP (ref 10–45)
ALT FLD-CCNC: SIGNIFICANT CHANGE UP (ref 10–45)
ANION GAP SERPL CALC-SCNC: 10 MMOL/L — SIGNIFICANT CHANGE UP (ref 5–17)
ANION GAP SERPL CALC-SCNC: 10 MMOL/L — SIGNIFICANT CHANGE UP (ref 5–17)
ANION GAP SERPL CALC-SCNC: 6 MMOL/L — SIGNIFICANT CHANGE UP (ref 5–17)
APTT BLD: 25.7 SEC — LOW (ref 27.5–35.5)
AST SERPL-CCNC: 14 U/L — SIGNIFICANT CHANGE UP (ref 10–40)
AST SERPL-CCNC: SIGNIFICANT CHANGE UP (ref 10–40)
BASOPHILS # BLD AUTO: 0.04 K/UL — SIGNIFICANT CHANGE UP (ref 0–0.2)
BASOPHILS NFR BLD AUTO: 0.6 % — SIGNIFICANT CHANGE UP (ref 0–2)
BILIRUB SERPL-MCNC: 0.3 MG/DL — SIGNIFICANT CHANGE UP (ref 0.2–1.2)
BILIRUB SERPL-MCNC: 0.5 MG/DL — SIGNIFICANT CHANGE UP (ref 0.2–1.2)
BLD GP AB SCN SERPL QL: NEGATIVE — SIGNIFICANT CHANGE UP
BUN SERPL-MCNC: 8 MG/DL — SIGNIFICANT CHANGE UP (ref 7–23)
BUN SERPL-MCNC: 8 MG/DL — SIGNIFICANT CHANGE UP (ref 7–23)
BUN SERPL-MCNC: 9 MG/DL — SIGNIFICANT CHANGE UP (ref 7–23)
CALCIUM SERPL-MCNC: 9 MG/DL — SIGNIFICANT CHANGE UP (ref 8.4–10.5)
CALCIUM SERPL-MCNC: 9 MG/DL — SIGNIFICANT CHANGE UP (ref 8.4–10.5)
CALCIUM SERPL-MCNC: 9.3 MG/DL — SIGNIFICANT CHANGE UP (ref 8.4–10.5)
CHLORIDE SERPL-SCNC: 103 MMOL/L — SIGNIFICANT CHANGE UP (ref 96–108)
CHLORIDE SERPL-SCNC: 105 MMOL/L — SIGNIFICANT CHANGE UP (ref 96–108)
CHLORIDE SERPL-SCNC: 105 MMOL/L — SIGNIFICANT CHANGE UP (ref 96–108)
CO2 SERPL-SCNC: 21 MMOL/L — LOW (ref 22–31)
CO2 SERPL-SCNC: 24 MMOL/L — SIGNIFICANT CHANGE UP (ref 22–31)
CO2 SERPL-SCNC: 26 MMOL/L — SIGNIFICANT CHANGE UP (ref 22–31)
CREAT SERPL-MCNC: 0.62 MG/DL — SIGNIFICANT CHANGE UP (ref 0.5–1.3)
CREAT SERPL-MCNC: 0.66 MG/DL — SIGNIFICANT CHANGE UP (ref 0.5–1.3)
CREAT SERPL-MCNC: 0.68 MG/DL — SIGNIFICANT CHANGE UP (ref 0.5–1.3)
EOSINOPHIL # BLD AUTO: 0.51 K/UL — HIGH (ref 0–0.5)
EOSINOPHIL NFR BLD AUTO: 7.7 % — HIGH (ref 0–6)
GLUCOSE SERPL-MCNC: 130 MG/DL — HIGH (ref 70–99)
GLUCOSE SERPL-MCNC: 94 MG/DL — SIGNIFICANT CHANGE UP (ref 70–99)
GLUCOSE SERPL-MCNC: 97 MG/DL — SIGNIFICANT CHANGE UP (ref 70–99)
HCG SERPL-ACNC: 843 MIU/ML — HIGH
HCT VFR BLD CALC: 35.2 % — SIGNIFICANT CHANGE UP (ref 34.5–45)
HGB BLD-MCNC: 10.8 G/DL — LOW (ref 11.5–15.5)
IMM GRANULOCYTES NFR BLD AUTO: 0.5 % — SIGNIFICANT CHANGE UP (ref 0–1.5)
INR BLD: 1.01 — SIGNIFICANT CHANGE UP (ref 0.88–1.16)
LYMPHOCYTES # BLD AUTO: 1.34 K/UL — SIGNIFICANT CHANGE UP (ref 1–3.3)
LYMPHOCYTES # BLD AUTO: 20.2 % — SIGNIFICANT CHANGE UP (ref 13–44)
MCHC RBC-ENTMCNC: 21.1 PG — LOW (ref 27–34)
MCHC RBC-ENTMCNC: 30.7 GM/DL — LOW (ref 32–36)
MCV RBC AUTO: 68.6 FL — LOW (ref 80–100)
MONOCYTES # BLD AUTO: 0.56 K/UL — SIGNIFICANT CHANGE UP (ref 0–0.9)
MONOCYTES NFR BLD AUTO: 8.4 % — SIGNIFICANT CHANGE UP (ref 2–14)
NEUTROPHILS # BLD AUTO: 4.15 K/UL — SIGNIFICANT CHANGE UP (ref 1.8–7.4)
NEUTROPHILS NFR BLD AUTO: 62.6 % — SIGNIFICANT CHANGE UP (ref 43–77)
NRBC # BLD: 0 /100 WBCS — SIGNIFICANT CHANGE UP (ref 0–0)
PLATELET # BLD AUTO: 383 K/UL — SIGNIFICANT CHANGE UP (ref 150–400)
POTASSIUM SERPL-MCNC: 3.5 MMOL/L — SIGNIFICANT CHANGE UP (ref 3.5–5.3)
POTASSIUM SERPL-MCNC: 3.6 MMOL/L — SIGNIFICANT CHANGE UP (ref 3.5–5.3)
POTASSIUM SERPL-MCNC: SIGNIFICANT CHANGE UP (ref 3.5–5.3)
POTASSIUM SERPL-SCNC: 3.5 MMOL/L — SIGNIFICANT CHANGE UP (ref 3.5–5.3)
POTASSIUM SERPL-SCNC: 3.6 MMOL/L — SIGNIFICANT CHANGE UP (ref 3.5–5.3)
POTASSIUM SERPL-SCNC: SIGNIFICANT CHANGE UP (ref 3.5–5.3)
PROT SERPL-MCNC: 7 G/DL — SIGNIFICANT CHANGE UP (ref 6–8.3)
PROT SERPL-MCNC: 7.7 G/DL — SIGNIFICANT CHANGE UP (ref 6–8.3)
PROTHROM AB SERPL-ACNC: 12.1 SEC — SIGNIFICANT CHANGE UP (ref 10.6–13.6)
RBC # BLD: 5.13 M/UL — SIGNIFICANT CHANGE UP (ref 3.8–5.2)
RBC # FLD: 15.3 % — HIGH (ref 10.3–14.5)
RH IG SCN BLD-IMP: POSITIVE — SIGNIFICANT CHANGE UP
SARS-COV-2 RNA SPEC QL NAA+PROBE: SIGNIFICANT CHANGE UP
SODIUM SERPL-SCNC: 133 MMOL/L — LOW (ref 135–145)
SODIUM SERPL-SCNC: 136 MMOL/L — SIGNIFICANT CHANGE UP (ref 135–145)
SODIUM SERPL-SCNC: 141 MMOL/L — SIGNIFICANT CHANGE UP (ref 135–145)
WBC # BLD: 6.63 K/UL — SIGNIFICANT CHANGE UP (ref 3.8–10.5)
WBC # FLD AUTO: 6.63 K/UL — SIGNIFICANT CHANGE UP (ref 3.8–10.5)

## 2021-07-28 PROCEDURE — 87635 SARS-COV-2 COVID-19 AMP PRB: CPT

## 2021-07-28 PROCEDURE — 85025 COMPLETE CBC W/AUTO DIFF WBC: CPT

## 2021-07-28 PROCEDURE — 36415 COLL VENOUS BLD VENIPUNCTURE: CPT

## 2021-07-28 PROCEDURE — 84702 CHORIONIC GONADOTROPIN TEST: CPT

## 2021-07-28 PROCEDURE — 76817 TRANSVAGINAL US OBSTETRIC: CPT

## 2021-07-28 PROCEDURE — 80048 BASIC METABOLIC PNL TOTAL CA: CPT

## 2021-07-28 PROCEDURE — 85610 PROTHROMBIN TIME: CPT

## 2021-07-28 PROCEDURE — 80053 COMPREHEN METABOLIC PANEL: CPT

## 2021-07-28 PROCEDURE — 86850 RBC ANTIBODY SCREEN: CPT

## 2021-07-28 PROCEDURE — 76801 OB US < 14 WKS SINGLE FETUS: CPT

## 2021-07-28 PROCEDURE — 86901 BLOOD TYPING SEROLOGIC RH(D): CPT

## 2021-07-28 PROCEDURE — 86900 BLOOD TYPING SEROLOGIC ABO: CPT

## 2021-07-28 PROCEDURE — 76830 TRANSVAGINAL US NON-OB: CPT | Mod: 26

## 2021-07-28 PROCEDURE — 99285 EMERGENCY DEPT VISIT HI MDM: CPT

## 2021-07-28 PROCEDURE — 85730 THROMBOPLASTIN TIME PARTIAL: CPT

## 2021-07-28 PROCEDURE — 76817 TRANSVAGINAL US OBSTETRIC: CPT | Mod: 26

## 2021-07-28 RX ORDER — FLUTICASONE FUROATE AND VILANTEROL TRIFENATATE 100; 25 UG/1; UG/1
1 POWDER RESPIRATORY (INHALATION)
Qty: 0 | Refills: 0 | DISCHARGE

## 2021-07-28 RX ORDER — ALBUTEROL 90 UG/1
2 AEROSOL, METERED ORAL
Qty: 0 | Refills: 0 | DISCHARGE

## 2021-07-28 RX ORDER — ALBUTEROL 90 UG/1
2 AEROSOL, METERED ORAL EVERY 6 HOURS
Refills: 0 | Status: DISCONTINUED | OUTPATIENT
Start: 2021-07-28 | End: 2021-07-28

## 2021-07-28 RX ORDER — ALBUTEROL 90 UG/1
2 AEROSOL, METERED ORAL
Qty: 0 | Refills: 0 | DISCHARGE
Start: 2021-07-28

## 2021-07-28 RX ORDER — SODIUM CHLORIDE 9 MG/ML
1000 INJECTION, SOLUTION INTRAVENOUS
Refills: 0 | Status: DISCONTINUED | OUTPATIENT
Start: 2021-07-28 | End: 2021-07-28

## 2021-07-28 RX ORDER — METHOTREXATE 2.5 MG/1
72 TABLET ORAL ONCE
Refills: 0 | Status: COMPLETED | OUTPATIENT
Start: 2021-07-28 | End: 2021-07-28

## 2021-07-28 RX ADMIN — METHOTREXATE 72 MILLIGRAM(S): 2.5 TABLET ORAL at 14:02

## 2021-07-28 RX ADMIN — SODIUM CHLORIDE 125 MILLILITER(S): 9 INJECTION, SOLUTION INTRAVENOUS at 07:00

## 2021-07-28 NOTE — CHART NOTE - NSCHARTNOTEFT_GEN_A_CORE
30y P4 with confirmed c/s scar ectopic pregnancy at 7+6wga, with confirmed gestational sac and yolk sac, no FH. b-hcg 843      Patient counseled on medical vs surgical treatment options for ectopic pregnancy.   - Risks of MTX including but not limited to GI complaints, possible rupture of ectopic pregnancy, possible need for re-dose of medication, and possible need for emergency surgery discussed.   - Risks of surgery discussed including infection, bleeding, damage to adjacent organs, scarring with salpingostomy, and possible removal of fallopian tube.   - Patient does not have contraindications to MTX including blood dyscrasias, active pulmonary disease, clinically significant hepatic /renal disease, or hematologic dysfunction.   - Patient was counseled on avoiding folic acid containing foods, prenatal vitamins, alcohol, breastfeeding, as well as intercourse and strenuous activity.  - Patient understands she cannot attempt to conceive for at least 3 months.   - Methotrexate 72 mg IM injected once into buttock. Patient tolerated well.   - All questions and concerns addressed   - Patient to follow up on 7/30     for repeat bHCG check and TVUS at [facility] with Dr. Patel

## 2021-07-28 NOTE — H&P ADULT - ASSESSMENT
31yo  at 7w6d by LMP (6/3) presents for r/o ectopic pregnancy, TVUS concerning for possible ectopic pregnancy in the CS scar.  - currently hemodynamically stable, no s/s that are concerning for uterine rupture or intra-abdominal bleeding  - admit to GYN   - NPO, IVF  - plan for maternal fetal medicine consult in the AM and repeat TVUS  - vitals per routine  - d/w Dr. Moore

## 2021-07-28 NOTE — DISCHARGE NOTE PROVIDER - NSDCMRMEDTOKEN_GEN_ALL_CORE_FT
Breo Ellipta 100 mcg-25 mcg/inh inhalation powder: 1 puff(s) inhaled once a day  Colace 100 mg oral capsule: 1 cap(s) orally 2 times a day as needed for constipation caused by percocet  oxycodone-acetaminophen 5 mg-325 mg oral tablet: 1 tab(s) orally every 6 hours as needed for severe pain MDD:4  Pepcid 20 mg oral tablet: 1 tab(s) orally 2 times a day  Ventolin HFA 90 mcg/inh inhalation aerosol: 2 puff(s) inhaled every 6 hours, As Needed   albuterol 90 mcg/inh inhalation aerosol: 2 puff(s) inhaled every 6 hours, As needed, Bronchospasm

## 2021-07-28 NOTE — DISCHARGE NOTE PROVIDER - CARE PROVIDER_API CALL
Supa Patel)  MaternalFetal Medicine; Obstetrics and Gynecology  [address] [place] [address]  Phone: [phone]  Fax: [phone]  Follow Up Time:

## 2021-07-28 NOTE — DISCHARGE NOTE PROVIDER - HOSPITAL COURSE
31yo  at 7+6 by LMP presented for suspected c/s ectopic. Asymptomatic with stable vitals, Hb 10.8. Pt received official TVUS through the ED and with Dr. Patel at [facility] unit, both of which confirmed c/s scar ectopic. Gestational sac and yolk sac identified, no FH. b-hcg 843. Administered 72mg of MTX, pt tolerated well. Appointment made at [facility] for repeat TVUS with Dr. Patel  at 1300. Pt given all MTX and return precautions.

## 2021-07-28 NOTE — H&P ADULT - HISTORY OF PRESENT ILLNESS
29yo  at 7w6d by LMP (6/3) presents for r/o ectopic pregnancy. Patient reports having a positive UPT today, and went to her OBGYN Dr. Mederos for this issues. TVUS did now show an IUP or EUP, so she sent her to the ED for an official ultrasound to r/o ectopic pregnancy. She denies any abd pain, vaginal bleeding, n/v, f/c lightheadedness/dizziness, SOB, palpitations.    Obhx:  MAB D+C, 2009 term pCS for "dry labor" in the [facility],  term rCS,  term rCS,  term rCS  GynHx: denies  PMH: graves disease (on methimazole 5mg qd), asthma (hospitalized in the last 10 years, never intubated. On fluticasone and vilanterol QHS, albuterol PRN), thalassemia minor  PSH: l/s cholecystectomy   meds: methimazole 5mg qd, fluticasone and vilanterol QHS, albuterol PRN  all: nkda  social: occasional cigarettes    Vital Signs Last 24 Hrs  T(C): 36.6 (2021 01:57), Max: 37 (2021 23:08)  T(F): 97.8 (2021 01:57), Max: 98.6 (2021 23:08)  HR: 89 (2021 01:57) (89 - 107)  BP: 113/88 (2021 01:57) (113/88 - 133/80)  BP(mean): --  RR: 16 (2021 01:57) (16 - 18)  SpO2: 99% (2021 01:57) (99% - 99%)    Physical Exam:  Gen: NAD, comfortable  GI: soft, nontender, nondistended + BS, no rebound no guarding  Ext: no edema, erythema, tenderness     LABS:                        10.8   6.63  )-----------( 383      ( 2021 23:54 )             35.2     07-28    136  |  105  |  8   ----------------------------<  130<H>  3.6   |  21<L>  |  0.62    Ca    9.0      2021 02:36    TPro  7.7  /  Alb  4.3  /  TBili  0.3  /  DBili  x   /  AST  See Note  /  ALT  See Note  /  AlkPhos  See Note      PT/INR - ( 2021 02:36 )   PT: 12.1 sec;   INR: 1.01          PTT - ( 2021 02:36 )  PTT:25.7 sec      EXAM:  US OB TRANSVAGINAL                          EXAM:  US OB LES THAN 14 WKS 1ST GEST                          PROCEDURE DATE:  2021          INTERPRETATION:  OBSTETRICAL ULTRASOUND - FIRST TRIMESTER dated 2021 11:44 PM    INDICATION:First trimester pregnancy. Rule out ectopic.  LMP: 6/3/2021    TECHNIQUE: Transabdominal views of the pelvis were obtained followed by transvaginal views for better visualization of the endometrial cavity.    PRIOR STUDIES: None    FINDINGS:  By dates, the estimated gestational age is 7 weeks 6 days.  An intrauterine gestation sac is seen, possibly implanted within a prior  scar in the lower uterine segment. The mean sac diameter is 0.4 cm corresponding to a gestational age of 5 weeks 0 days.  No embryo is visualized.  No subchorionic bleed is visible.    No intrauterine pregnancy or gestation sac is visualized.    The uterus is anteverted and measures 7 by 4 x 4 centimeters. No myometrial abnormalities are seen. The endometrium measures 1.3 cm in thickness. The cervix is closed with a length of 3.6 cm.    The right ovary is normal in size, measuring 3.0 x 2.0 x 1.8 cm. 1.7 x 1.5 x 1.4 cm right ovarian corpus luteum The left ovary is normal in size, measuring 2.4 x 1.0 x 1.7 cm. No left ovarian masses are seen. Doppler evaluation demonstrates flow to both ovaries with no evidence of torsion.      IMPRESSION:  1.  Early developing, probable  pregnancy.  2.  Gestational sac corresponds to an ultrasound age of 5 weeks 0days. No embryo is visualized, which can be a normal finding in early pregnancy.        --- End of Report ---

## 2021-07-28 NOTE — DISCHARGE NOTE NURSING/CASE MANAGEMENT/SOCIAL WORK - PATIENT PORTAL LINK FT
You can access the FollowMyHealth Patient Portal offered by [facility] by registering at the following website: [email or web address]. By joining [employer]’s FollowMyHealth portal, you will also be able to view your health information using other applications (apps) compatible with our system.

## 2021-07-30 ENCOUNTER — ASOB RESULT (OUTPATIENT)
Age: 31
End: 2021-07-30

## 2021-07-30 ENCOUNTER — APPOINTMENT (OUTPATIENT)
Dept: ANTEPARTUM | Facility: CLINIC | Age: 31
End: 2021-07-30
Payer: MEDICAID

## 2021-07-30 PROCEDURE — 76830 TRANSVAGINAL US NON-OB: CPT

## 2021-07-30 NOTE — CHART NOTE - NSCHARTNOTEFT_GEN_A_CORE
Attempted to call patient twice regarding her US appointment at [facility] today, 7/30, as well as to confirm her plan to return to the ED tomorrow, 7/31 for repeat b-hcg. Phone mailbox has not been set up. Will attempt to call again later today.

## 2021-07-30 NOTE — CHART NOTE - NSCHARTNOTEFT_GEN_A_CORE
Patient returned our call and confirmed that she will come to the [facility] ED tomorrow for her day 4 bHCG. Pt said she would stop by and request to see gynecology. Pt feeling well. Understands importance of follow up.     Danni Carr PA-C

## 2021-07-31 ENCOUNTER — EMERGENCY (EMERGENCY)
Facility: HOSPITAL | Age: 31
LOS: 1 days | Discharge: HOME | End: 2021-07-31
Attending: EMERGENCY MEDICINE | Admitting: EMERGENCY MEDICINE
Payer: MEDICAID

## 2021-07-31 VITALS
OXYGEN SATURATION: 100 % | HEART RATE: 90 BPM | TEMPERATURE: 98 F | SYSTOLIC BLOOD PRESSURE: 100 MMHG | DIASTOLIC BLOOD PRESSURE: 60 MMHG | RESPIRATION RATE: 18 BRPM

## 2021-07-31 VITALS
RESPIRATION RATE: 16 BRPM | WEIGHT: 139.99 LBS | TEMPERATURE: 98 F | SYSTOLIC BLOOD PRESSURE: 108 MMHG | DIASTOLIC BLOOD PRESSURE: 72 MMHG | OXYGEN SATURATION: 99 % | HEIGHT: 60 IN | HEART RATE: 102 BPM

## 2021-07-31 DIAGNOSIS — O99.511 DISEASES OF THE RESPIRATORY SYSTEM COMPLICATING PREGNANCY, FIRST TRIMESTER: ICD-10-CM

## 2021-07-31 DIAGNOSIS — J45.909 UNSPECIFIED ASTHMA, UNCOMPLICATED: ICD-10-CM

## 2021-07-31 DIAGNOSIS — O00.90 UNSPECIFIED ECTOPIC PREGNANCY WITHOUT INTRAUTERINE PREGNANCY: ICD-10-CM

## 2021-07-31 DIAGNOSIS — D56.3 THALASSEMIA MINOR: ICD-10-CM

## 2021-07-31 DIAGNOSIS — E05.90 THYROTOXICOSIS, UNSPECIFIED WITHOUT THYROTOXIC CRISIS OR STORM: ICD-10-CM

## 2021-07-31 DIAGNOSIS — Z98.891 HISTORY OF UTERINE SCAR FROM PREVIOUS SURGERY: Chronic | ICD-10-CM

## 2021-07-31 DIAGNOSIS — O09.291 SUPERVISION OF PREGNANCY WITH OTHER POOR REPRODUCTIVE OR OBSTETRIC HISTORY, FIRST TRIMESTER: ICD-10-CM

## 2021-07-31 DIAGNOSIS — O99.011 ANEMIA COMPLICATING PREGNANCY, FIRST TRIMESTER: ICD-10-CM

## 2021-07-31 DIAGNOSIS — O99.281 ENDOCRINE, NUTRITIONAL AND METABOLIC DISEASES COMPLICATING PREGNANCY, FIRST TRIMESTER: ICD-10-CM

## 2021-07-31 LAB
ALBUMIN SERPL ELPH-MCNC: 4.5 G/DL — SIGNIFICANT CHANGE UP (ref 3.3–5)
ALP SERPL-CCNC: 82 U/L — SIGNIFICANT CHANGE UP (ref 40–120)
ALT FLD-CCNC: 38 U/L — SIGNIFICANT CHANGE UP (ref 10–45)
ANION GAP SERPL CALC-SCNC: 7 MMOL/L — SIGNIFICANT CHANGE UP (ref 5–17)
ANISOCYTOSIS BLD QL: SLIGHT — SIGNIFICANT CHANGE UP
AST SERPL-CCNC: 23 U/L — SIGNIFICANT CHANGE UP (ref 10–40)
BASOPHILS # BLD AUTO: 0 K/UL — SIGNIFICANT CHANGE UP (ref 0–0.2)
BASOPHILS NFR BLD AUTO: 0 % — SIGNIFICANT CHANGE UP (ref 0–2)
BILIRUB SERPL-MCNC: 0.2 MG/DL — SIGNIFICANT CHANGE UP (ref 0.2–1.2)
BUN SERPL-MCNC: 10 MG/DL — SIGNIFICANT CHANGE UP (ref 7–23)
CALCIUM SERPL-MCNC: 9.3 MG/DL — SIGNIFICANT CHANGE UP (ref 8.4–10.5)
CHLORIDE SERPL-SCNC: 106 MMOL/L — SIGNIFICANT CHANGE UP (ref 96–108)
CO2 SERPL-SCNC: 26 MMOL/L — SIGNIFICANT CHANGE UP (ref 22–31)
CREAT SERPL-MCNC: 0.72 MG/DL — SIGNIFICANT CHANGE UP (ref 0.5–1.3)
DACRYOCYTES BLD QL SMEAR: SIGNIFICANT CHANGE UP
ELLIPTOCYTES BLD QL SMEAR: SLIGHT — SIGNIFICANT CHANGE UP
EOSINOPHIL # BLD AUTO: 0.46 K/UL — SIGNIFICANT CHANGE UP (ref 0–0.5)
EOSINOPHIL NFR BLD AUTO: 7.3 % — HIGH (ref 0–6)
GLUCOSE SERPL-MCNC: 93 MG/DL — SIGNIFICANT CHANGE UP (ref 70–99)
HCG SERPL-ACNC: 1019 MIU/ML — HIGH
HCT VFR BLD CALC: 36 % — SIGNIFICANT CHANGE UP (ref 34.5–45)
HGB BLD-MCNC: 11.1 G/DL — LOW (ref 11.5–15.5)
HYPOCHROMIA BLD QL: SIGNIFICANT CHANGE UP
LYMPHOCYTES # BLD AUTO: 1.26 K/UL — SIGNIFICANT CHANGE UP (ref 1–3.3)
LYMPHOCYTES # BLD AUTO: 20 % — SIGNIFICANT CHANGE UP (ref 13–44)
MANUAL SMEAR VERIFICATION: SIGNIFICANT CHANGE UP
MCHC RBC-ENTMCNC: 21.1 PG — LOW (ref 27–34)
MCHC RBC-ENTMCNC: 30.8 GM/DL — LOW (ref 32–36)
MCV RBC AUTO: 68.4 FL — LOW (ref 80–100)
MICROCYTES BLD QL: SIGNIFICANT CHANGE UP
MONOCYTES # BLD AUTO: 0.17 K/UL — SIGNIFICANT CHANGE UP (ref 0–0.9)
MONOCYTES NFR BLD AUTO: 2.7 % — SIGNIFICANT CHANGE UP (ref 2–14)
NEUTROPHILS # BLD AUTO: 3.99 K/UL — SIGNIFICANT CHANGE UP (ref 1.8–7.4)
NEUTROPHILS NFR BLD AUTO: 63.6 % — SIGNIFICANT CHANGE UP (ref 43–77)
OVALOCYTES BLD QL SMEAR: SLIGHT — SIGNIFICANT CHANGE UP
PLAT MORPH BLD: ABNORMAL
PLATELET # BLD AUTO: 354 K/UL — SIGNIFICANT CHANGE UP (ref 150–400)
POIKILOCYTOSIS BLD QL AUTO: SLIGHT — SIGNIFICANT CHANGE UP
POLYCHROMASIA BLD QL SMEAR: SLIGHT — SIGNIFICANT CHANGE UP
POTASSIUM SERPL-MCNC: 3.8 MMOL/L — SIGNIFICANT CHANGE UP (ref 3.5–5.3)
POTASSIUM SERPL-SCNC: 3.8 MMOL/L — SIGNIFICANT CHANGE UP (ref 3.5–5.3)
PROT SERPL-MCNC: 7.5 G/DL — SIGNIFICANT CHANGE UP (ref 6–8.3)
RBC # BLD: 5.26 M/UL — HIGH (ref 3.8–5.2)
RBC # FLD: 15.1 % — HIGH (ref 10.3–14.5)
RBC BLD AUTO: ABNORMAL
SMUDGE CELLS # BLD: PRESENT — SIGNIFICANT CHANGE UP
SODIUM SERPL-SCNC: 139 MMOL/L — SIGNIFICANT CHANGE UP (ref 135–145)
TARGETS BLD QL SMEAR: SLIGHT — SIGNIFICANT CHANGE UP
VARIANT LYMPHS # BLD: 6.4 % — HIGH (ref 0–6)
WBC # BLD: 6.28 K/UL — SIGNIFICANT CHANGE UP (ref 3.8–10.5)
WBC # FLD AUTO: 6.28 K/UL — SIGNIFICANT CHANGE UP (ref 3.8–10.5)

## 2021-07-31 PROCEDURE — 80053 COMPREHEN METABOLIC PANEL: CPT

## 2021-07-31 PROCEDURE — 85025 COMPLETE CBC W/AUTO DIFF WBC: CPT

## 2021-07-31 PROCEDURE — 99284 EMERGENCY DEPT VISIT MOD MDM: CPT

## 2021-07-31 PROCEDURE — 84702 CHORIONIC GONADOTROPIN TEST: CPT

## 2021-07-31 PROCEDURE — 36415 COLL VENOUS BLD VENIPUNCTURE: CPT

## 2021-07-31 NOTE — CONSULT NOTE ADULT - ASSESSMENT
31yo  at 8+2 by LMP s/p methotrexate  for confirmed c/s scar ectopic pregnancy presents for day 4 repeat b-hcg. Asymptomatic  -VSS  -Hb stable at 11.1 from 10.8  -b-hcg uptrended from 843 to 1019, which is expected on Day 4  -Pt has appointment for ultrasound at [facility] with M  and will return to the ED for Day 7 b-hcg Tuesday, 8/3  -Pt again given return precautions - to return for any severe pain, heavy vaginal bleeding, dizziness, lightheadedness  -All questions and concerns addressed    Discussed with Dr. Velasquez

## 2021-07-31 NOTE — CONSULT NOTE ADULT - SUBJECTIVE AND OBJECTIVE BOX
31yo  at 8+2 by LMP s/p methotrexate  for confirmed c/s scar ectopic pregnancy presents for day 4 repeat b-hcg. Pt reports that she feels well, denies n/v, abdominal pain, vaginal bleeding. She reports that she has been adhering to the MTX precaution guidelines. b-hcg on  was 843 (day 1).     Obhx:  MAB D+C,  term pCS for arrest of dilation in the [facility],  term rCS,  term rCS,  term rCS  GynHx: denies  PMH: graves disease (on methimazole 5mg qd), asthma (hospitalized in the last 10 years, never intubated. On fluticasone and vilanterol QHS, albuterol PRN), thalassemia minor  PSH: l/s cholecystectomy , c/s x 4  meds: methimazole 5mg qd, fluticasone and vilanterol QHS, albuterol PRN  all: nkda  social: occasional cigarettes    PHYSICAL EXAM:   Vital Signs Last 24 Hrs  T(C): 36.5 (2021 19:51), Max: 36.7 (2021 17:57)  T(F): 97.7 (2021 19:51), Max: 98 (2021 17:57)  HR: 90 (2021 19:51) (90 - 102)  BP: 100/60 (2021 19:51) (100/60 - 108/72)  BP(mean): --  RR: 18 (2021 19:51) (16 - 18)  SpO2: 100% (2021 19:51) (99% - 100%)    **************************  Constitutional: Alert & Oriented x3, No acute distress  Respiratory: Clear to ausculation bilaterally; no wheezing, rhonchi, or crackles  Cardiovascular: regular rate and rhythm, no murmurs, or gallops  Gastrointestinal: soft, non tender, positive bowel sounds, no rebound or guarding   Pelvic exam: deferred  Extremities: no calf tenderness or swelling    LABS:                        11.1   6.28  )-----------( 354      ( 2021 18:33 )             36.0         139  |  106  |  10  ----------------------------<  93  3.8   |  26  |  0.72    Ca    9.3      2021 18:33    TPro  7.5  /  Alb  4.5  /  TBili  0.2  /  DBili  x   /  AST  23  /  ALT  38  /  AlkPhos  82          HCG Quantitative, Serum: 1019 mIU/mL ( @ 18:33)

## 2021-07-31 NOTE — ED PROVIDER NOTE - PATIENT PORTAL LINK FT
You can access the FollowMyHealth Patient Portal offered by [facility] by registering at the following website: [email or web address]. By joining [employer]’s FollowMyHealth portal, you will also be able to view your health information using other applications (apps) compatible with our system.

## 2021-07-31 NOTE — ED PROVIDER NOTE - OBJECTIVE STATEMENT
29 yo F,  (4 live births and 1 prior miscarriage) with recebt dx of [id number] scar ectopivc pregamvyu, received Methotrexate 3 days ago presents today for beta cvheck. 29 yo F,  (4 live births and 1 prior miscarriage) PMH Asthma, Thalassemia Minor, hyperthyroidism (methimazole) PSxH cholecystectomy,  p/w ?pregnancy. Pt is , LMP 6/3, did have minimal bleeding  as well. Ptwith recebt dx of [id number] scar ectopivc pregamvyu, received Methotrexate 3 days ago presents today for beta cvheck.  uspected c/s ectopic. Asymptomatic with stable vitals, Hb 10.8. Pt received official TVUS through the ED and with Dr. Patel at [facility] unit, both of which confirmed c/s scar ectopic. Gestational sac and yolk sac identified, no FH. b-hcg 843. Administered 72mg of MTX, pt tolerated well. Appointment made at [facility] for repeat TVUS with Dr. Patel  at 1300. Pt given all MTX and return precautions. 31 yo F with PMH of Asthma, Thalassemia Minor, hyperthyroidism (methimazole) PSxH cholecystectomy, c-sections X 4,  (4 live births and 1 prior miscarriage), LMP 6/3, did have minimal bleeding  as well. Ptwith recebt dx of [id number] scar ectopivc pregamvyu, received Methotrexate 3 days ago presents today for beta cvheck.  uspected c/s ectopic. Asymptomatic with stable vitals, Hb 10.8. Pt received official TVUS through the ED and with Dr. Patel at [facility] unit, both of which confirmed c/s scar ectopic. Gestational sac and yolk sac identified, no FH. b-hcg 843. Administered 72mg of MTX, pt tolerated well. Appointment made at [facility] for repeat TVUS with Dr. Patel  at 1300. Pt given all MTX and return precautions. 31 yo F with PMH of Asthma, Thalassemia Minor, hyperthyroidism (methimazole) PSxH cholecystectomy, c-sections X 4,  (4 live births and 1 prior miscarriage), LMP 6/3 (did have minimal bleeding  as well) with recent dx of  scar ectopic pregnancy and received Methotrexate 3 days ago presents today for beta check. Pt is asymptomatic. Had an u/s at [facility] yesterday which per pt report shows stable ectopic pregnancy and was asked to come to ED today for beta check. Denies abd pain, recent vaginal bleeding, dizziness, LOC, urinary sxs, CP, SOB. No other complaints.

## 2021-07-31 NOTE — ED PROVIDER NOTE - CLINICAL SUMMARY MEDICAL DECISION MAKING FREE TEXT BOX
31 yo F with PMH of Asthma, Thalassemia Minor, hyperthyroidism (methimazole) PSxH cholecystectomy, c-sections X 4,  (4 live births and 1 prior miscarriage), LMP 6/3 (did have minimal bleeding  as well) with recent dx of  scar ectopic pregnancy and received Methotrexate 3 days ago presents today for beta check. Pt is asymptomatic. Had an u/s at [facility] yesterday which per pt report shows stable ectopic pregnancy and was asked to come to ED today for beta check. Denies abd pain, recent vaginal bleeding, dizziness, LOC, urinary sxs, CP, SOB. No other complaints.   ED course: Pt HD stable. labs noted amd quant brta 1019, up from ./ Gyn consulted and saw pt in ED/ {t to return to ER on Tuesday for beta-check and to get an u/s on Monday at [facility]. Pt understands plan. Strict return precautions given. 29 yo F with PMH of Asthma, Thalassemia Minor, hyperthyroidism (methimazole) PSxH cholecystectomy, c-sections X 4,  (4 live births and 1 prior miscarriage), LMP 6/3 (did have minimal bleeding  as well) with recent dx of  scar ectopic pregnancy and received Methotrexate 3 days ago presents today for beta check. Pt is asymptomatic. Had an u/s at [facility] yesterday which per pt report shows stable ectopic pregnancy and was asked to come to ED today for beta check. Denies abd pain, recent vaginal bleeding, dizziness, LOC, urinary sxs, CP, SOB. No other complaints.   ED course: Pt HD stable. Labs noted, Hgb stable, quant beta 1019 (up from 873 from 5 days ago). Gyn consulted and saw pt in ED. Pt to return to ED on Tuesday for beta-check and also to get an u/s on Monday at [facility]. Pt understands plan. Strict return precautions given. 29 yo F with PMH of Asthma, Thalassemia Minor, hyperthyroidism (methimazole) PSxH cholecystectomy, c-sections X 4,  (4 live births and 1 prior miscarriage), LMP 6/3 (did have minimal bleeding  as well) with recent dx of  scar ectopic pregnancy and received Methotrexate 3 days ago presents today for beta check. Pt is asymptomatic. Had an u/s at [facility] yesterday which per pt report shows stable ectopic pregnancy and was asked to come to ED today for beta check. Denies abd pain, recent vaginal bleeding, dizziness, LOC, urinary sxs, CP, SOB. No other complaints.   ED course: Pt HD stable. Labs noted, Hgb stable, quant beta 1019 (up from 843 from 4 days ago). Gyn consulted and saw pt in ED. Pt to return to ED on Tuesday for beta-check and also to get an u/s on Monday at [facility]. Pt understands plan. Strict return precautions given.

## 2021-08-01 ENCOUNTER — TRANSCRIPTION ENCOUNTER (OUTPATIENT)
Age: 31
End: 2021-08-01

## 2021-08-02 ENCOUNTER — APPOINTMENT (OUTPATIENT)
Dept: ANTEPARTUM | Facility: CLINIC | Age: 31
End: 2021-08-02
Payer: MEDICAID

## 2021-08-02 ENCOUNTER — ASOB RESULT (OUTPATIENT)
Age: 31
End: 2021-08-02

## 2021-08-02 ENCOUNTER — INPATIENT (INPATIENT)
Facility: HOSPITAL | Age: 31
LOS: 0 days | Discharge: HOME | DRG: 819 | End: 2021-08-03
Attending: PEDIATRICS | Admitting: PEDIATRICS
Payer: MEDICAID

## 2021-08-02 VITALS
TEMPERATURE: 98 F | OXYGEN SATURATION: 100 % | HEART RATE: 72 BPM | RESPIRATION RATE: 18 BRPM | DIASTOLIC BLOOD PRESSURE: 61 MMHG | SYSTOLIC BLOOD PRESSURE: 114 MMHG

## 2021-08-02 DIAGNOSIS — O26.899 OTHER SPECIFIED PREGNANCY RELATED CONDITIONS, UNSPECIFIED TRIMESTER: ICD-10-CM

## 2021-08-02 DIAGNOSIS — Z3A.00 WEEKS OF GESTATION OF PREGNANCY NOT SPECIFIED: ICD-10-CM

## 2021-08-02 DIAGNOSIS — Z98.891 HISTORY OF UTERINE SCAR FROM PREVIOUS SURGERY: Chronic | ICD-10-CM

## 2021-08-02 LAB
ANISOCYTOSIS BLD QL: SLIGHT — SIGNIFICANT CHANGE UP
BASO STIPL BLD QL SMEAR: PRESENT — SIGNIFICANT CHANGE UP
BASOPHILS # BLD AUTO: 0 K/UL — SIGNIFICANT CHANGE UP (ref 0–0.2)
BASOPHILS NFR BLD AUTO: 0 % — SIGNIFICANT CHANGE UP (ref 0–2)
BLD GP AB SCN SERPL QL: NEGATIVE — SIGNIFICANT CHANGE UP
DACRYOCYTES BLD QL SMEAR: SLIGHT — SIGNIFICANT CHANGE UP
EOSINOPHIL # BLD AUTO: 0.57 K/UL — HIGH (ref 0–0.5)
EOSINOPHIL NFR BLD AUTO: 6.9 % — HIGH (ref 0–6)
HCT VFR BLD CALC: 33.8 % — LOW (ref 34.5–45)
HGB BLD-MCNC: 10.3 G/DL — LOW (ref 11.5–15.5)
HYPOCHROMIA BLD QL: SIGNIFICANT CHANGE UP
LYMPHOCYTES # BLD AUTO: 1.51 K/UL — SIGNIFICANT CHANGE UP (ref 1–3.3)
LYMPHOCYTES # BLD AUTO: 18.3 % — SIGNIFICANT CHANGE UP (ref 13–44)
MANUAL SMEAR VERIFICATION: SIGNIFICANT CHANGE UP
MCHC RBC-ENTMCNC: 21.1 PG — LOW (ref 27–34)
MCHC RBC-ENTMCNC: 30.5 GM/DL — LOW (ref 32–36)
MCV RBC AUTO: 69.3 FL — LOW (ref 80–100)
MICROCYTES BLD QL: SLIGHT — SIGNIFICANT CHANGE UP
MONOCYTES # BLD AUTO: 0.22 K/UL — SIGNIFICANT CHANGE UP (ref 0–0.9)
MONOCYTES NFR BLD AUTO: 2.6 % — SIGNIFICANT CHANGE UP (ref 2–14)
NEUTROPHILS # BLD AUTO: 5.83 K/UL — SIGNIFICANT CHANGE UP (ref 1.8–7.4)
NEUTROPHILS NFR BLD AUTO: 69.6 % — SIGNIFICANT CHANGE UP (ref 43–77)
NEUTS BAND # BLD: 0.9 % — SIGNIFICANT CHANGE UP (ref 0–8)
OVALOCYTES BLD QL SMEAR: SLIGHT — SIGNIFICANT CHANGE UP
PLAT MORPH BLD: NORMAL — SIGNIFICANT CHANGE UP
PLATELET # BLD AUTO: 362 K/UL — SIGNIFICANT CHANGE UP (ref 150–400)
RBC # BLD: 4.88 M/UL — SIGNIFICANT CHANGE UP (ref 3.8–5.2)
RBC # FLD: 14.8 % — HIGH (ref 10.3–14.5)
RBC BLD AUTO: ABNORMAL
RH IG SCN BLD-IMP: POSITIVE — SIGNIFICANT CHANGE UP
SARS-COV-2 RNA SPEC QL NAA+PROBE: SIGNIFICANT CHANGE UP
TARGETS BLD QL SMEAR: SLIGHT — SIGNIFICANT CHANGE UP
VARIANT LYMPHS # BLD: 1.7 % — SIGNIFICANT CHANGE UP (ref 0–6)
WBC # BLD: 8.27 K/UL — SIGNIFICANT CHANGE UP (ref 3.8–10.5)
WBC # FLD AUTO: 8.27 K/UL — SIGNIFICANT CHANGE UP (ref 3.8–10.5)

## 2021-08-02 PROCEDURE — 59812 TREATMENT OF MISCARRIAGE: CPT

## 2021-08-02 PROCEDURE — 76816 OB US FOLLOW-UP PER FETUS: CPT

## 2021-08-02 RX ORDER — SODIUM CHLORIDE 9 MG/ML
1000 INJECTION, SOLUTION INTRAVENOUS
Refills: 0 | Status: DISCONTINUED | OUTPATIENT
Start: 2021-08-02 | End: 2021-08-03

## 2021-08-02 RX ORDER — SIMETHICONE 80 MG/1
80 TABLET, CHEWABLE ORAL EVERY 6 HOURS
Refills: 0 | Status: DISCONTINUED | OUTPATIENT
Start: 2021-08-02 | End: 2021-08-03

## 2021-08-02 RX ORDER — KETOROLAC TROMETHAMINE 30 MG/ML
30 SYRINGE (ML) INJECTION EVERY 6 HOURS
Refills: 0 | Status: COMPLETED | OUTPATIENT
Start: 2021-08-02 | End: 2021-08-03

## 2021-08-02 RX ORDER — ONDANSETRON 8 MG/1
8 TABLET, FILM COATED ORAL EVERY 8 HOURS
Refills: 0 | Status: DISCONTINUED | OUTPATIENT
Start: 2021-08-02 | End: 2021-08-03

## 2021-08-02 RX ORDER — OXYCODONE HYDROCHLORIDE 5 MG/1
5 TABLET ORAL EVERY 4 HOURS
Refills: 0 | Status: DISCONTINUED | OUTPATIENT
Start: 2021-08-02 | End: 2021-08-03

## 2021-08-02 RX ORDER — METOCLOPRAMIDE HCL 10 MG
10 TABLET ORAL EVERY 8 HOURS
Refills: 0 | Status: DISCONTINUED | OUTPATIENT
Start: 2021-08-02 | End: 2021-08-03

## 2021-08-02 RX ORDER — ACETAMINOPHEN 500 MG
1000 TABLET ORAL EVERY 6 HOURS
Refills: 0 | Status: DISCONTINUED | OUTPATIENT
Start: 2021-08-02 | End: 2021-08-03

## 2021-08-02 RX ORDER — HYDROMORPHONE HYDROCHLORIDE 2 MG/ML
0.2 INJECTION INTRAMUSCULAR; INTRAVENOUS; SUBCUTANEOUS
Refills: 0 | Status: DISCONTINUED | OUTPATIENT
Start: 2021-08-02 | End: 2021-08-03

## 2021-08-02 RX ORDER — OXYCODONE HYDROCHLORIDE 5 MG/1
10 TABLET ORAL EVERY 4 HOURS
Refills: 0 | Status: DISCONTINUED | OUTPATIENT
Start: 2021-08-02 | End: 2021-08-03

## 2021-08-02 RX ORDER — HYDROMORPHONE HYDROCHLORIDE 2 MG/ML
0.5 INJECTION INTRAMUSCULAR; INTRAVENOUS; SUBCUTANEOUS
Refills: 0 | Status: DISCONTINUED | OUTPATIENT
Start: 2021-08-02 | End: 2021-08-03

## 2021-08-02 RX ADMIN — Medication 30 MILLIGRAM(S): at 23:35

## 2021-08-02 RX ADMIN — OXYCODONE HYDROCHLORIDE 5 MILLIGRAM(S): 5 TABLET ORAL at 21:17

## 2021-08-02 RX ADMIN — OXYCODONE HYDROCHLORIDE 5 MILLIGRAM(S): 5 TABLET ORAL at 22:00

## 2021-08-02 NOTE — BRIEF OPERATIVE NOTE - OPERATION/FINDINGS
gestational sac with yolk sac visualized at the uterine scar with abdominal and transvaginal US. Cervix serially dilated to 8. Cook balloon inserted, uterine balloon inflated to 8cc, then the vaginal balloon was inserted into the uterus as well and inflated to 7-8cc's. Uterine balloon inflated to total of 15cc's, and the cook balloon was held to tension. Abd and transvaginal US showed proper placement of both balloons in the uterus, and the gestational sac was no longer visualized.

## 2021-08-02 NOTE — H&P ADULT - ASSESSMENT
31yo  at 8+3 by LMP s/p methotrexate  for confirmed c/s scar ectopic pregnancy presents from [facility] with newly dx FH on US today.   - VS  -CBC/T&S  -Placement of roberson cook balloon by Dr. Patel  -Repeat b-hcg 8/3 as new "day 1" beta   -Reg diet  -Heplock  -OOB as tolerated    Discussed with Dr. Patel and Dr. Bruner

## 2021-08-02 NOTE — PATIENT PROFILE OB - FUNCTIONAL SCREEN CURRENT LEVEL: BATHING, MLM
Chief Complaint:  IUD (Intrauterine device) check.    History of Present Illness:  Justino is a 20 year old female,      , who presents today for an IUD check.  She is status post placement of a paragard IUD on 21.  This was done for contraception.  She reports doing well since placement.  She's having her first period since placement now and it's a normal maybe even lighter period for her.  She thought the nexplanon may have been impacting her moods and she has noticed improvement since removal.    I have reviewed the patient's medications and allergies, past medical, surgical, social and family history, updating these as appropriate.  See Histories section of the electronic medical record for a display of this information.    PHYSICAL EXAM:    VITALS:  Blood pressure 110/74, height 5' 3\" (1.6 m), weight 73.5 kg, last menstrual period 2021., Body mass index is 28.7 kg/m².  General: Friendly, cooperative, and in no apparent distress.  Psychiatric: Alert and oriented x3.    Pelvic: On speculum exam, IUD strings are visible.      ASSESSMENT:    IUD appropriately placed    PLAN:    Advised returning for IUD or GYN concerns.   0 = independent

## 2021-08-02 NOTE — BRIEF OPERATIVE NOTE - NSICDXBRIEFPROCEDURE_GEN_ALL_CORE_FT
PROCEDURES:  Surgical treatment of ectopic pregnancy without salpingectomy 02-Aug-2021 20:04:16  Florencio Brand

## 2021-08-02 NOTE — H&P ADULT - HISTORY OF PRESENT ILLNESS
31yo  at 8+3 by LMP s/p methotrexate  for confirmed c/s scar ectopic pregnancy presents from [facility] with newly dx FH on US today. Prior to today's US, only gestational sac and yolk sac had been seen. As pt has now failed medical management of c/s scar ectopic, she will be admitted for insertion of roberson cook balloon placement to mechanically disrupt the pregnancy. b-hcg on  was 843 (day 1) and then uptrended to 1019 on Day 4 ().   Pt reports that she feels well, denies n/v, abdominal pain, vaginal bleeding.    Obhx:  MAB D+C, 2009 term pCS for arrest of dilation in the [facility],  term rCS,  term rCS, 2015 term rCS  GynHx: denies  PMH: graves disease (on methimazole 5mg qd), asthma (hospitalized in the last 10 years, never intubated. On fluticasone and vilanterol QHS, albuterol PRN), thalassemia minor  PSH: l/s cholecystectomy , c/s x 4  meds: methimazole 5mg qd, fluticasone and vilanterol QHS, albuterol PRN  all: NKDA  social: occasional cigarettes    PHYSICAL EXAM:

## 2021-08-02 NOTE — H&P ADULT - NSHPLABSRESULTS_GEN_ALL_CORE
CBC  T&S pending CBC  T&S pending    ----------------------------------------------------------------------   OBSTETRICS REPORT                  (Signed Final 2021 04:16 pm)  ----------------------------------------------------------------------  Patient Info     ID #:       [id number]                      :  [birth year] (30 yrs)(F)   Name:       WICHO ANTON                Visit Date: 2021 02:55 pm               FAINSAN  ----------------------------------------------------------------------  Performed By     Performed By:     Izzy Garcia RDMS   Attending:        Supa Patel M.D.   Referred By:      Labor Delivery   Location:         [facility]   Visit Type:       MFM  ----------------------------------------------------------------------  Indications     8 weeks gestation of pregnancy                 Z3A.08   Previous  delivery (C/S) x 4   Suspected  scar ectopic   s/p methotrexate  ----------------------------------------------------------------------  Vital Signs     Height:      5'0"  ----------------------------------------------------------------------  Fetal Evaluation     Num Of Fetuses:         0  ----------------------------------------------------------------------  Biometry     CRL:         2  mm     G.Age:   5w 5d                   TOM:   22    ----------------------------------------------------------------------  OB History     :    6         Term:   4         SAB:   1   Livin  ----------------------------------------------------------------------  Gestational Age     Best:          8w 4d    Det. By:  LMP  (21)    OTM:   03/10/22  ----------------------------------------------------------------------  Comments     Transvaginal ultrasound was performed.   Normal size anteverted uterus is noted.   Previously described suspected  scar ectopic   pregnancy visualized once again.   Gestational sac and yolk sac seen. Some views was   suspected a fetal pole with very slow cardiac activity.   The CRL measurement are consistent with 5w 5d.   Resolving corpus luteum in the right ovary. Normal   appearing left ovary.     Findings were discussed with the patient as well as the   treatment options for Cs scar pregnancy. Suggested   treatment with intra-uterine balloon. Risks benefits and   alternatives explained.  ----------------------------------------------------------------------  Diagnosis     s/p methotrexate   Suspected                                   Sent to admission   scar ectopic                                        for treatment of CS                                                       scar ectopic with                                                       cardiac activity  ----------------------------------------------------------------------  Recommendations     The results of today's exam were discussed with the   patient. Need for follow up was discussed with the   patient.  ----------------------------------------------------------------------                     Supa Patel  Electronically Signed Final Report   2021 04:16 pm

## 2021-08-02 NOTE — H&P ADULT - NSHPPHYSICALEXAM_GEN_ALL_CORE
Constitutional: Alert & Oriented x3, No acute distress  Respiratory: Clear to ausculation bilaterally; no wheezing, rhonchi, or crackles  Cardiovascular: regular rate and rhythm, no murmurs, or gallops  Gastrointestinal: soft, non tender, positive bowel sounds, no rebound or guarding   Pelvic exam: deferred  Extremities: no calf tenderness or swelling Vital Signs Last 24 Hrs  T(C): 36.8 (02 Aug 2021 16:46), Max: 36.8 (02 Aug 2021 16:46)  T(F): 98.2 (02 Aug 2021 16:46), Max: 98.2 (02 Aug 2021 16:46)  HR: 72 (02 Aug 2021 16:46) (72 - 72)  BP: 114/61 (02 Aug 2021 16:46) (114/61 - 114/61)  BP(mean): --  RR: 18 (02 Aug 2021 16:46) (18 - 18)  SpO2: 100% (02 Aug 2021 16:46) (100% - 100%)        Constitutional: Alert & Oriented x3, No acute distress  Respiratory: Clear to ausculation bilaterally; no wheezing, rhonchi, or crackles  Cardiovascular: regular rate and rhythm, no murmurs, or gallops  Gastrointestinal: soft, non tender, positive bowel sounds, no rebound or guarding   Pelvic exam: deferred  Extremities: no calf tenderness or swelling

## 2021-08-03 ENCOUNTER — TRANSCRIPTION ENCOUNTER (OUTPATIENT)
Age: 31
End: 2021-08-03

## 2021-08-03 ENCOUNTER — APPOINTMENT (OUTPATIENT)
Dept: ANTEPARTUM | Facility: CLINIC | Age: 31
End: 2021-08-03
Payer: MEDICAID

## 2021-08-03 ENCOUNTER — ASOB RESULT (OUTPATIENT)
Age: 31
End: 2021-08-03

## 2021-08-03 VITALS
DIASTOLIC BLOOD PRESSURE: 66 MMHG | TEMPERATURE: 98 F | SYSTOLIC BLOOD PRESSURE: 99 MMHG | RESPIRATION RATE: 18 BRPM | OXYGEN SATURATION: 98 % | HEART RATE: 88 BPM

## 2021-08-03 DIAGNOSIS — F17.210 NICOTINE DEPENDENCE, CIGARETTES, UNCOMPLICATED: ICD-10-CM

## 2021-08-03 DIAGNOSIS — Z3A.01 LESS THAN 8 WEEKS GESTATION OF PREGNANCY: ICD-10-CM

## 2021-08-03 DIAGNOSIS — O99.331 SMOKING (TOBACCO) COMPLICATING PREGNANCY, FIRST TRIMESTER: ICD-10-CM

## 2021-08-03 DIAGNOSIS — O99.011 ANEMIA COMPLICATING PREGNANCY, FIRST TRIMESTER: ICD-10-CM

## 2021-08-03 DIAGNOSIS — O34.219 MATERNAL CARE FOR UNSPECIFIED TYPE SCAR FROM PREVIOUS CESAREAN DELIVERY: ICD-10-CM

## 2021-08-03 DIAGNOSIS — O00.90 UNSPECIFIED ECTOPIC PREGNANCY WITHOUT INTRAUTERINE PREGNANCY: ICD-10-CM

## 2021-08-03 DIAGNOSIS — D56.3 THALASSEMIA MINOR: ICD-10-CM

## 2021-08-03 DIAGNOSIS — J45.909 UNSPECIFIED ASTHMA, UNCOMPLICATED: ICD-10-CM

## 2021-08-03 DIAGNOSIS — O99.281 ENDOCRINE, NUTRITIONAL AND METABOLIC DISEASES COMPLICATING PREGNANCY, FIRST TRIMESTER: ICD-10-CM

## 2021-08-03 DIAGNOSIS — O00.80 OTHER ECTOPIC PREGNANCY WITHOUT INTRAUTERINE PREGNANCY: ICD-10-CM

## 2021-08-03 DIAGNOSIS — Z20.822 CONTACT WITH AND (SUSPECTED) EXPOSURE TO COVID-19: ICD-10-CM

## 2021-08-03 DIAGNOSIS — E05.00 THYROTOXICOSIS WITH DIFFUSE GOITER WITHOUT THYROTOXIC CRISIS OR STORM: ICD-10-CM

## 2021-08-03 DIAGNOSIS — O99.511 DISEASES OF THE RESPIRATORY SYSTEM COMPLICATING PREGNANCY, FIRST TRIMESTER: ICD-10-CM

## 2021-08-03 LAB
BASOPHILS # BLD AUTO: 0.04 K/UL — SIGNIFICANT CHANGE UP (ref 0–0.2)
BASOPHILS NFR BLD AUTO: 0.4 % — SIGNIFICANT CHANGE UP (ref 0–2)
EOSINOPHIL # BLD AUTO: 0.46 K/UL — SIGNIFICANT CHANGE UP (ref 0–0.5)
EOSINOPHIL NFR BLD AUTO: 4.9 % — SIGNIFICANT CHANGE UP (ref 0–6)
HCG SERPL-ACNC: 296 MIU/ML — HIGH
HCT VFR BLD CALC: 32.1 % — LOW (ref 34.5–45)
HGB BLD-MCNC: 9.9 G/DL — LOW (ref 11.5–15.5)
IMM GRANULOCYTES NFR BLD AUTO: 0.5 % — SIGNIFICANT CHANGE UP (ref 0–1.5)
LYMPHOCYTES # BLD AUTO: 1.67 K/UL — SIGNIFICANT CHANGE UP (ref 1–3.3)
LYMPHOCYTES # BLD AUTO: 17.8 % — SIGNIFICANT CHANGE UP (ref 13–44)
MCHC RBC-ENTMCNC: 21.5 PG — LOW (ref 27–34)
MCHC RBC-ENTMCNC: 30.8 GM/DL — LOW (ref 32–36)
MCV RBC AUTO: 69.8 FL — LOW (ref 80–100)
MONOCYTES # BLD AUTO: 0.82 K/UL — SIGNIFICANT CHANGE UP (ref 0–0.9)
MONOCYTES NFR BLD AUTO: 8.7 % — SIGNIFICANT CHANGE UP (ref 2–14)
NEUTROPHILS # BLD AUTO: 6.35 K/UL — SIGNIFICANT CHANGE UP (ref 1.8–7.4)
NEUTROPHILS NFR BLD AUTO: 67.7 % — SIGNIFICANT CHANGE UP (ref 43–77)
NRBC # BLD: 0 /100 WBCS — SIGNIFICANT CHANGE UP (ref 0–0)
PLATELET # BLD AUTO: 317 K/UL — SIGNIFICANT CHANGE UP (ref 150–400)
RBC # BLD: 4.6 M/UL — SIGNIFICANT CHANGE UP (ref 3.8–5.2)
RBC # FLD: 14.9 % — HIGH (ref 10.3–14.5)
WBC # BLD: 9.39 K/UL — SIGNIFICANT CHANGE UP (ref 3.8–10.5)
WBC # FLD AUTO: 9.39 K/UL — SIGNIFICANT CHANGE UP (ref 3.8–10.5)

## 2021-08-03 PROCEDURE — 76857 US EXAM PELVIC LIMITED: CPT

## 2021-08-03 RX ORDER — ACETAMINOPHEN 500 MG
2 TABLET ORAL
Qty: 0 | Refills: 0 | DISCHARGE
Start: 2021-08-03

## 2021-08-03 RX ADMIN — Medication 30 MILLIGRAM(S): at 00:01

## 2021-08-03 RX ADMIN — Medication 30 MILLIGRAM(S): at 06:54

## 2021-08-03 NOTE — PROGRESS NOTE ADULT - SUBJECTIVE AND OBJECTIVE BOX
GYN Progress Note  POD#1     Patient seen and examined at bedside.  No acute events overnight. No acute complaints.  Pain well controlled, reports some mild cramping.   Patient is ambulating and tolerating regular diet.   Voiding spontaneously   Denies CP, SOB, N/V, fevers, and chills.    Vital Signs Last 24 Hours  T(C): 37 (08-03-21 @ 06:15), Max: 37 (08-03-21 @ 06:15)  HR: 84 (08-03-21 @ 06:15) (70 - 93)  BP: 101/59 (08-03-21 @ 06:15) (101/59 - 127/74)  RR: 16 (08-03-21 @ 06:15) (16 - 18)  SpO2: 100% (08-03-21 @ 06:15) (97% - 100%)    I&O's Summary      Physical Exam:  General: NAD  CV: RR, S1, S2  Lungs: CTA b/l, good air flow b/l   Abdomen: Soft, non tender to palpation, non distended, present bowel sounds in all 4 quadrants   : no bleeding on pad; both uterine balloons removed, scant amount of bleeding noted   Ext: warm and well perfused    Labs:                        10.3   8.27  )-----------( 362      ( 02 Aug 2021 17:54 )             33.8   baso 0.0    eos 6.9    imm gran x      lymph 18.3   mono 2.6    poly 69.6       MEDICATIONS  (STANDING):  acetaminophen   Tablet .. 1000 milliGRAM(s) Oral every 6 hours  ketorolac   Injectable 30 milliGRAM(s) IV Push every 6 hours  lactated ringers. 1000 milliLiter(s) (100 mL/Hr) IV Continuous <Continuous>  methimazole 5 milliGRAM(s) Oral every 24 hours    MEDICATIONS  (PRN):  HYDROmorphone  Injectable 0.5 milliGRAM(s) IV Push every 15 minutes PRN Severe Pain (7 - 10)  HYDROmorphone  Injectable 0.2 milliGRAM(s) IV Push every 15 minutes PRN Severe Pain (7 - 10)  metoclopramide Injectable 10 milliGRAM(s) IV Push every 8 hours PRN Nausea and/or Vomiting  ondansetron Injectable 8 milliGRAM(s) IV Push every 8 hours PRN Nausea and/or Vomiting  oxyCODONE    IR 5 milliGRAM(s) Oral every 4 hours PRN Moderate Pain (4 - 6)  oxyCODONE    IR 10 milliGRAM(s) Oral every 4 hours PRN Severe Pain (7 - 10)  simethicone 80 milliGRAM(s) Chew every 6 hours PRN Gas

## 2021-08-03 NOTE — DISCHARGE NOTE PROVIDER - PROVIDER TOKENS
PROVIDER:[TOKEN:[[id number]],FOLLOWUP:[2 weeks]],PROVIDER:[TOKEN:[[id number]],SCHEDULEDAPPT:[08/05/2021],SCHEDULEDAPPTTIME:[01:30 PM],ESTABLISHEDPATIENT:[T]]

## 2021-08-03 NOTE — DISCHARGE NOTE ANTEPARTUM - MEDICATION SUMMARY - MEDICATIONS TO TAKE
I will START or STAY ON the medications listed below when I get home from the hospital:    acetaminophen 500 mg oral tablet  -- 2 tab(s) by mouth every 6 hours  -- Indication: For pain    methIMAzole 5 mg oral tablet  -- orally once a day  -- Indication: For Hyperthyroidism    Ventolin HFA 90 mcg/inh inhalation aerosol  -- inhaled prn  -- Indication: For asthma    Breo Ellipta 100 mcg-25 mcg/inh inhalation powder  -- 1 puff(s) inhaled once a day  -- Indication: For asthma

## 2021-08-03 NOTE — DISCHARGE NOTE PROVIDER - CARE PROVIDERS DIRECT ADDRESSES
[email or web address]

## 2021-08-03 NOTE — DISCHARGE NOTE PROVIDER - NSDCMRMEDTOKEN_GEN_ALL_CORE_FT
acetaminophen 500 mg oral tablet: 2 tab(s) orally every 6 hours  Breo Ellipta 100 mcg-25 mcg/inh inhalation powder: 1 puff(s) inhaled once a day  methIMAzole 5 mg oral tablet: orally once a day  Ventolin HFA 90 mcg/inh inhalation aerosol: inhaled prn

## 2021-08-03 NOTE — DISCHARGE NOTE ANTEPARTUM - HOSPITAL COURSE
31yo  at 8+4 by LMP s/p methotrexate  for confirmed c/s scar ectopic pregnancy presents after failed medical management of c/s scar ectopic with a +FH noticed on an US yesterday.  Pt was admitted for management with insertion of roberson cook balloon placement to mechanically disrupt the pregnancy on  by Dr Patel.  Pt had balloon taken out this morning with no difficulty. Pt currently is asymptomatic.   Pt reports that she feels well, denies n/v, abdominal pain, vaginal bleeding.  VSS. Pt hemodynamically stable for d/c and given strict return precautions.  Pt has follow up with Dr Patel to confirm this management now worked.

## 2021-08-03 NOTE — DISCHARGE NOTE ANTEPARTUM - PROVIDER TOKENS
FREE:[LAST:[Amb[facility] Unit],PHONE:[phone]],FAX:[phone]   [phone]    -],ADDRESS:[[address] [place] [address]],FOLLOWUP:[2 weeks]]

## 2021-08-03 NOTE — DISCHARGE NOTE PROVIDER - NSDCFUADDAPPT_GEN_ALL_CORE_FT
Please go to [facility] on 8/5 at 1:30 and 8/10 at 10AM.  After you have your Ultrasound at [facility] on 8/10 please come into the ED for a repeat blood test.     If you would like to establish care with our OBGYN please call [phone] for an appointment located at [facility] [address] [place] [address]

## 2021-08-03 NOTE — DISCHARGE NOTE PROVIDER - NSDCQMERRANDS_GEN_ALL_CORE
OARRS verified. Last fill 10/26/17 #30  MME# 0.00. This has been sent to MD to sign and is pending MD signature. No

## 2021-08-03 NOTE — DISCHARGE NOTE PROVIDER - HOSPITAL COURSE
29yo  at 8+4 by LMP s/p methotrexate  for confirmed c/s scar ectopic pregnancy presents after failed medical management of c/s scar ectopic due to a fetal heart rate noticed on an US yesterday.  Pt was admitted for management with insertion of roberson cook balloon placement to mechanically disrupt the pregnancy on  by Dr Patel.  Pt had balloon taken out this morning with no difficulty. Pt currently is asymptomatic.   Pt reports that she feels well, denies n/v, abdominal pain, vaginal bleeding.  VSS. Pt hemodynamically stable for d/c and given strict return precautions.  Pt has follow up with Dr Patel to confirm this management now worked. 31yo  at 8+4 by LMP s/p methotrexate  for confirmed c/s scar ectopic pregnancy presents after failed medical management of c/s scar ectopic with a +FH noticed on an US yesterday.  Pt was admitted for management with insertion of roberson cook balloon placement to mechanically disrupt the pregnancy on  by Dr Patel.  Pt had balloon taken out this morning with no difficulty. Pt currently is asymptomatic.   Pt reports that she feels well, denies n/v, abdominal pain, vaginal bleeding.  VSS. Pt hemodynamically stable for d/c and given strict return precautions.  Pt has follow up with Dr Patel to confirm this management now worked.

## 2021-08-03 NOTE — DISCHARGE NOTE ANTEPARTUM - PATIENT PORTAL LINK FT
You can access the FollowMyHealth Patient Portal offered by [facility] by registering at the following website: [email or web address]. By joining [employer]’s FollowMyHealth portal, you will also be able to view your health information using other applications (apps) compatible with our system.

## 2021-08-03 NOTE — DISCHARGE NOTE ANTEPARTUM - CARE PLAN
No Principal Discharge DX:	Ectopic pregnancy  Goal:	safe and healthy discharge to home  Assessment and plan of treatment:	31yo  at 8+4 by LMP s/p methotrexate  for confirmed c/s scar ectopic pregnancy presents after failed medical management of c/s scar ectopic with a +FH noticed on an US yesterday.  Pt was admitted for management with insertion of roberson cook balloon placement to mechanically disrupt the pregnancy on  by Dr Patel.  Pt had balloon taken out this morning with no difficulty. Pt currently is asymptomatic.   Pt reports that she feels well, denies n/v, abdominal pain, vaginal bleeding.  VSS. Pt hemodynamically stable for d/c and given strict return precautions.  Pt has follow up with Dr Patel to confirm this management now worked.

## 2021-08-03 NOTE — DISCHARGE NOTE PROVIDER - NSDCCPTREATMENT_GEN_ALL_CORE_FT
PRINCIPAL PROCEDURE  Procedure: Surgical treatment of ectopic pregnancy without salpingectomy  Findings and Treatment:

## 2021-08-03 NOTE — DISCHARGE NOTE PROVIDER - NSDCFUADDINST_GEN_ALL_CORE_FT
Pelvic rest (nothing in vagina) unless otherwise instructed by physician. Go to nearest ED if fever >100.4 F, severe abdominal pain or heavy vaginal bleeding.     Take Tylenol 1000mg every 8 hours as needed for pain     Please f/u with [facility] on 8/5 at 1:30PM and 8/10 at 10AM. Please come to the ED on 8/10 for repeat bhCG blood test.

## 2021-08-03 NOTE — DISCHARGE NOTE ANTEPARTUM - CARE PROVIDER_API CALL
[facility],   [address] [address]  Phone: [phone]  Fax: (   )    -  Follow Up Time: 2 weeks

## 2021-08-03 NOTE — PROGRESS NOTE ADULT - ASSESSMENT
A/P: 30y POD#1  s/p placement of cervical roberson for disruption of  scar ectopic; balloons removed, patient stable,  doing well. Plan for official TVUS today to assess pregnancy.     Neuro: Pain well controlled, continue on PO pain medications  CV: hemodynamically stable, monitor vitals  Pulm: saturating well on room air, encourage oob/amb  GI: tolerating regular diet, SLIV  : voiding spontaneously  Heme: SCDs; f/u AM HCG   ID: afebrile  Dispo: discharge planning     Ok Sprague PGY3

## 2021-08-03 NOTE — DISCHARGE NOTE ANTEPARTUM - PLAN OF CARE
31yo  at 8+4 by LMP s/p methotrexate  for confirmed c/s scar ectopic pregnancy presents after failed medical management of c/s scar ectopic with a +FH noticed on an US yesterday.  Pt was admitted for management with insertion of roberson cook balloon placement to mechanically disrupt the pregnancy on  by Dr Patel.  Pt had balloon taken out this morning with no difficulty. Pt currently is asymptomatic.   Pt reports that she feels well, denies n/v, abdominal pain, vaginal bleeding.  VSS. Pt hemodynamically stable for d/c and given strict return precautions.  Pt has follow up with Dr Patel to confirm this management now worked. safe and healthy discharge to home

## 2021-08-03 NOTE — DISCHARGE NOTE PROVIDER - CARE PROVIDER_API CALL
lAex Bruner  OBSTETRICS AND GYNECOLOGY  [address] [place] [address]  Phone: [phone]  Fax: [phone]  Follow Up Time: 2 weeks    Supa Patel)  MaternalFetal Medicine; Obstetrics and Gynecology  [address] [place] [address]  Phone: [phone]  Fax: [phone]  Established Patient  Scheduled Appointment: 08/05/2021 01:30 PM

## 2021-08-05 ENCOUNTER — ASOB RESULT (OUTPATIENT)
Age: 31
End: 2021-08-05

## 2021-08-05 ENCOUNTER — APPOINTMENT (OUTPATIENT)
Dept: ANTEPARTUM | Facility: CLINIC | Age: 31
End: 2021-08-05
Payer: MEDICAID

## 2021-08-05 PROCEDURE — 76817 TRANSVAGINAL US OBSTETRIC: CPT

## 2021-08-10 ENCOUNTER — EMERGENCY (EMERGENCY)
Facility: HOSPITAL | Age: 31
LOS: 1 days | Discharge: HOME | End: 2021-08-10
Admitting: EMERGENCY MEDICINE
Payer: MEDICAID

## 2021-08-10 ENCOUNTER — ASOB RESULT (OUTPATIENT)
Age: 31
End: 2021-08-10

## 2021-08-10 ENCOUNTER — APPOINTMENT (OUTPATIENT)
Dept: ANTEPARTUM | Facility: CLINIC | Age: 31
End: 2021-08-10
Payer: MEDICAID

## 2021-08-10 VITALS
HEIGHT: 60 IN | SYSTOLIC BLOOD PRESSURE: 120 MMHG | TEMPERATURE: 98 F | WEIGHT: 139.99 LBS | DIASTOLIC BLOOD PRESSURE: 76 MMHG | RESPIRATION RATE: 17 BRPM | OXYGEN SATURATION: 100 % | HEART RATE: 73 BPM

## 2021-08-10 VITALS
HEART RATE: 81 BPM | RESPIRATION RATE: 16 BRPM | TEMPERATURE: 98 F | DIASTOLIC BLOOD PRESSURE: 79 MMHG | OXYGEN SATURATION: 100 % | SYSTOLIC BLOOD PRESSURE: 131 MMHG

## 2021-08-10 DIAGNOSIS — E05.00 THYROTOXICOSIS WITH DIFFUSE GOITER WITHOUT THYROTOXIC CRISIS OR STORM: ICD-10-CM

## 2021-08-10 DIAGNOSIS — Z3A.08 8 WEEKS GESTATION OF PREGNANCY: ICD-10-CM

## 2021-08-10 DIAGNOSIS — E05.90 THYROTOXICOSIS, UNSPECIFIED WITHOUT THYROTOXIC CRISIS OR STORM: ICD-10-CM

## 2021-08-10 DIAGNOSIS — J45.909 UNSPECIFIED ASTHMA, UNCOMPLICATED: ICD-10-CM

## 2021-08-10 DIAGNOSIS — Z98.891 HISTORY OF UTERINE SCAR FROM PREVIOUS SURGERY: Chronic | ICD-10-CM

## 2021-08-10 DIAGNOSIS — O99.511 DISEASES OF THE RESPIRATORY SYSTEM COMPLICATING PREGNANCY, FIRST TRIMESTER: ICD-10-CM

## 2021-08-10 DIAGNOSIS — O34.211 MATERNAL CARE FOR LOW TRANSVERSE SCAR FROM PREVIOUS CESAREAN DELIVERY: ICD-10-CM

## 2021-08-10 DIAGNOSIS — O99.281 ENDOCRINE, NUTRITIONAL AND METABOLIC DISEASES COMPLICATING PREGNANCY, FIRST TRIMESTER: ICD-10-CM

## 2021-08-10 DIAGNOSIS — Z90.49 ACQUIRED ABSENCE OF OTHER SPECIFIED PARTS OF DIGESTIVE TRACT: ICD-10-CM

## 2021-08-10 DIAGNOSIS — O99.011 ANEMIA COMPLICATING PREGNANCY, FIRST TRIMESTER: ICD-10-CM

## 2021-08-10 DIAGNOSIS — D56.3 THALASSEMIA MINOR: ICD-10-CM

## 2021-08-10 DIAGNOSIS — O00.90 UNSPECIFIED ECTOPIC PREGNANCY WITHOUT INTRAUTERINE PREGNANCY: ICD-10-CM

## 2021-08-10 DIAGNOSIS — O00.80 OTHER ECTOPIC PREGNANCY WITHOUT INTRAUTERINE PREGNANCY: ICD-10-CM

## 2021-08-10 DIAGNOSIS — O02.81 INAPPROPRIATE CHANGE IN QUANTITATIVE HUMAN CHORIONIC GONADOTROPIN (HCG) IN EARLY PREGNANCY: ICD-10-CM

## 2021-08-10 DIAGNOSIS — Z79.899 OTHER LONG TERM (CURRENT) DRUG THERAPY: ICD-10-CM

## 2021-08-10 LAB — HCG SERPL-ACNC: 3 MIU/ML — SIGNIFICANT CHANGE UP

## 2021-08-10 PROCEDURE — 99284 EMERGENCY DEPT VISIT MOD MDM: CPT

## 2021-08-10 PROCEDURE — 76817 TRANSVAGINAL US OBSTETRIC: CPT

## 2021-08-10 PROCEDURE — 84702 CHORIONIC GONADOTROPIN TEST: CPT

## 2021-08-10 PROCEDURE — 36415 COLL VENOUS BLD VENIPUNCTURE: CPT

## 2021-08-10 RX ORDER — ACETAMINOPHEN 500 MG
650 TABLET ORAL ONCE
Refills: 0 | Status: COMPLETED | OUTPATIENT
Start: 2021-08-10 | End: 2021-08-10

## 2021-08-10 RX ADMIN — Medication 650 MILLIGRAM(S): at 14:40

## 2021-08-10 NOTE — ED PROVIDER NOTE - OBJECTIVE STATEMENT
31 yo F with PMH of Asthma, Thalassemia Minor, hyperthyroidism (methimazole) PSxH cholecystectomy, c-sections X 4,  (4 live births and 1 prior miscarriage), LMP 6/3 with recent dx of  scar ectopic pregnancy and received Methotrexate 13 days ago and s/p cervical cook balloon on  presents today for beta check.  Pt reports mild spotting since procedure but otherwise feeling well.  Told to come today to have beta checked.  Denies f/c, headache, dizziness, fainting, chest pain, sob, abd pain, nvd, urinary sxs, trauma

## 2021-08-10 NOTE — CONSULT NOTE ADULT - ASSESSMENT
31yo  @9w5d by LMP s/p methotrexate  @8w2d for confirmed c/s scar ectopic pregnancy and subsequent disruption with cervical cook balloon on  presets for repeat b-hcg which resulted at <5. This is a negative pregnancy test. No further serial beta-hcg requires. Patient is asymptomatic and hemodynamically stable.    - Beta hCG <5  - Patient is hemodynamically stable. VS WNL.  - Hgb stable from prior. Vaginal bleeding minimal. No concern for hemorrhage.  - No further serial beta-hCG measurements are necessary  - Return precautions: Patient should return to ED for severe pain, heavy vaginal bleeding, dizziness, lightheadedness  - Patient will continue to follow with her outpatient obgyn: Dr. Asmita Schneider at [facility]      Florencia Gibbons MD PGY2  APRIL Jackson MD PGY4  APRIL Larsen MD   31yo  now s/p methotrexate  @8w2d for confirmed c/s scar ectopic pregnancy and subsequent disruption with cervical cook balloon on  presets for repeat b-hcg which resulted at <5. This is a negative pregnancy test. No further serial beta-hcg required. Patient is asymptomatic and hemodynamically stable.    - Beta hCG <5  - Patient is hemodynamically stable. VS WNL.  - Hgb stable from prior. Vaginal bleeding minimal. No concern for hemorrhage.  - No further serial beta-hCG measurements are necessary  - Return precautions: Patient should return to ED for severe pain, heavy vaginal bleeding, dizziness, lightheadedness  - Patient will continue to follow with her outpatient obgyn: Dr. Asmita Schneider at [facility]      Florencia Gibbons MD PGY2  APRIL Jackson MD PGY4  APRIL Larsen MD   29yo  now s/p methotrexate  @8w2d for confirmed c/s scar ectopic pregnancy and subsequent disruption with cervical cook balloon on  presets for repeat b-hcg which resulted at <5. This is a negative pregnancy test. No further serial beta-hcg required. Patient is asymptomatic and hemodynamically stable.    - Beta hCG <5  - Patient is hemodynamically stable. VS WNL.  - Hgb stable from prior. Vaginal bleeding minimal. No concern for hemorrhage.  - No further serial beta-hCG measurements are necessary  - Return precautions: Patient should return to ED for severe pain, heavy vaginal bleeding, dizziness, lightheadedness  - Patient has an appointment with Dr. Patel at [facility] for a repeat sonogram on  @1400.  - Patient will continue to follow with her outpatient obgyn: Dr. Asmita Schneider at [facility].      Florencia Gibbons MD PGY2  APRIL Jackson MD PGY4  APRIL Larsen MD

## 2021-08-10 NOTE — ED PROVIDER NOTE - PATIENT PORTAL LINK FT
You can access the FollowMyHealth Patient Portal offered by [facility] by registering at the following website: [email or web address]. By joining [employer]’s FollowMyHealth portal, you will also be able to view your health information using other applications (apps) compatible with our system.

## 2021-08-10 NOTE — ED PROVIDER NOTE - NSFOLLOWUPINSTRUCTIONS_ED_ALL_ED_FT
Please follow up with your OB doctor as instructed.  Return to ED for any fever, abdominal pain, heavy vaginal  bleeding, dizziness, fainting or other concerns Please follow up with your OB doctor as instructed on 8/17.  Return to ED for any fever, abdominal pain, heavy vaginal  bleeding, dizziness, fainting or other concerns

## 2021-08-10 NOTE — ED PROVIDER NOTE - PHYSICAL EXAMINATION
Vitals reviewed  Gen: well appearing, nad, speaking in full sentences  Skin: wwp, no rash/lesions  HEENT: ncat, eomi, mmm  CV: rrr, no audible m/r/g  Resp: symmetrical expansion, ctab, no w/r/r  Abd: nondistended, soft, nontender, no rebound/guarding  Pelvic deferred to GYN  Ext: FROM throughout, no peripheral edema  Neuro: alert/oriented, no focal deficits, steady gait

## 2021-08-10 NOTE — ED ADULT TRIAGE NOTE - CHIEF COMPLAINT QUOTE
Pt to ED for HCG check. Pt states she had an ectopic pregnancy and was given Methotrexate. Denies any current symptoms.

## 2021-08-10 NOTE — CONSULT NOTE ADULT - SUBJECTIVE AND OBJECTIVE BOX
31yo  @9w5d by LMP s/p methotrexate  @8w2d for confirmed c/s scar ectopic pregnancy and subsequent disruption with cervical cook balloon on  presets for repeat b-hcg. Pt reports that she feels well. She denies fevers, chills, chest pain, palpitations, SOB, nausea, vomiting. She endorses vaginal bleeding that is minimal.      Ob hx:  MAB D+C,  term pCS for arrest of dilation in the [facility],  term rCS,  term rCS,  term rCS  Gyn hx: Denies  PMH: graves disease (on methimazole 5mg qd), asthma (hospitalized in the last 10 years, never intubated. On fluticasone and vilanterol QHS, albuterol PRN), beta thalassemia minor  Meds: Methimazole 5mg qd, fluticasone and vilanterol QHS, albuterol PRN  PSH:  l/s cholecystectomy, C/S x 4  Allergies: NKDA  Social: Occasional cigarette      Vital Signs Last 24 Hrs  T(C): 36.7 (10 Aug 2021 12:35), Max: 36.7 (10 Aug 2021 12:35)  T(F): 98 (10 Aug 2021 12:35), Max: 98 (10 Aug 2021 12:35)  HR: 73 (10 Aug 2021 12:35) (73 - 73)  BP: 120/76 (10 Aug 2021 12:35) (120/76 - 120/76)  BP(mean): --  RR: 17 (10 Aug 2021 12:35) (17 - 17)  SpO2: 100% (10 Aug 2021 12:35) (100% - 100%)      Physical Exam  Gen: Well-appearing. NAD.  Resp: Breathing comfortably on RA. Lungs CTAB.  CV: RRR, no murmurs.  Abd: Soft, non tender, non-distended, no rebound or guarding, +BS  Pelvic exam:       Spec: Blood-tinged discharge from cervical os and old blood. No active bleeding. Multiparous os.  Ext: No calf tenderness.      LABS:              HCG Quantitative, Serum: 3 mIU/mL (08-10 @ 13:44)      RADIOLOGY & ADDITIONAL STUDIES:      TVUS @[facility] by Dr. Patel 8/10:   Transvaginal ultrasound was performed.   Normal size anteverted uterus is noted.   The  scar pregnancy is no longer seen.   Heterogenous intra-cavitary clot is noted measuring 3.4   x 2.2 x 0.7 cm. The  scar niche does not   demonstrate marked vascularity.   Sonographic surveillance and beta hCG surveillance   suggested until beta is zero.   Normal appearing ovaries. 31yo  s/p methotrexate  @8w2d for confirmed c/s scar ectopic pregnancy and subsequent disruption with cervical cook balloon on  presets for repeat b-hcg. Pt reports that she feels well. She denies fevers, chills, chest pain, palpitations, SOB, nausea, vomiting. She endorses vaginal bleeding that is minimal.      Ob hx:  MAB D+C,  term pCS for arrest of dilation in the [facility],  term rCS,  term rCS,  term rCS  Gyn hx: Denies  PMH: graves disease (on methimazole 5mg qd), asthma (hospitalized in the last 10 years, never intubated. On fluticasone and vilanterol QHS, albuterol PRN), beta thalassemia minor  Meds: Methimazole 5mg qd, fluticasone and vilanterol QHS, albuterol PRN  PSH:  l/s cholecystectomy, C/S x 4  Allergies: NKDA  Social: Occasional cigarette      Vital Signs Last 24 Hrs  T(C): 36.7 (10 Aug 2021 12:35), Max: 36.7 (10 Aug 2021 12:35)  T(F): 98 (10 Aug 2021 12:35), Max: 98 (10 Aug 2021 12:35)  HR: 73 (10 Aug 2021 12:35) (73 - 73)  BP: 120/76 (10 Aug 2021 12:35) (120/76 - 120/76)  BP(mean): --  RR: 17 (10 Aug 2021 12:35) (17 - 17)  SpO2: 100% (10 Aug 2021 12:35) (100% - 100%)      Physical Exam  Gen: Well-appearing. NAD.  Resp: Breathing comfortably on RA. Lungs CTAB.  CV: RRR, no murmurs.  Abd: Soft, non tender, non-distended, no rebound or guarding, +BS  Pelvic exam:       Spec: Blood-tinged discharge from cervical os and old blood. No active bleeding. Multiparous os.  Ext: No calf tenderness.      LABS:              HCG Quantitative, Serum: 3 mIU/mL (08-10 @ 13:44)      RADIOLOGY & ADDITIONAL STUDIES:      TVUS @[facility] by Dr. Patel 8/10:   Transvaginal ultrasound was performed.   Normal size anteverted uterus is noted.   The  scar pregnancy is no longer seen.   Heterogenous intra-cavitary clot is noted measuring 3.4   x 2.2 x 0.7 cm. The  scar niche does not   demonstrate marked vascularity.   Sonographic surveillance and beta hCG surveillance   suggested until beta is zero.   Normal appearing ovaries. 29yo  s/p methotrexate  @8w2d for confirmed c/s scar ectopic pregnancy and subsequent disruption with cervical cook balloon on  presets for repeat b-hcg. Pt reports that she feels well. She denies fevers, chills, chest pain, palpitations, SOB, nausea, vomiting, abdominal pain. She endorses vaginal bleeding that is minimal.      Ob hx:  MAB D+C,  term pCS for arrest of dilation in the [facility],  term rCS,  term rCS,  term rCS  Gyn hx: Denies  PMH: graves disease (on methimazole 5mg qd), asthma (hospitalized in the last 10 years, never intubated. On fluticasone and vilanterol QHS, albuterol PRN), beta thalassemia minor  Meds: Methimazole 5mg qd, fluticasone and vilanterol QHS, albuterol PRN  PSH:  l/s cholecystectomy, C/S x 4  Allergies: NKDA  Social: Occasional cigarette      Vital Signs Last 24 Hrs  T(C): 36.7 (10 Aug 2021 12:35), Max: 36.7 (10 Aug 2021 12:35)  T(F): 98 (10 Aug 2021 12:35), Max: 98 (10 Aug 2021 12:35)  HR: 73 (10 Aug 2021 12:35) (73 - 73)  BP: 120/76 (10 Aug 2021 12:35) (120/76 - 120/76)  BP(mean): --  RR: 17 (10 Aug 2021 12:35) (17 - 17)  SpO2: 100% (10 Aug 2021 12:35) (100% - 100%)      Physical Exam  Gen: Well-appearing. NAD.  Resp: Breathing comfortably on RA. Lungs CTAB.  CV: RRR, no murmurs.  Abd: Soft, non tender, non-distended, no rebound or guarding, +BS  Pelvic exam:       Spec: Blood-tinged discharge from cervical os and old blood. No active bleeding. Multiparous os.  Ext: No calf tenderness.      LABS:              HCG Quantitative, Serum: 3 mIU/mL (08-10 @ 13:44)      RADIOLOGY & ADDITIONAL STUDIES:      TVUS @[facility] by Dr. Patel 8/10:   Transvaginal ultrasound was performed.   Normal size anteverted uterus is noted.   The  scar pregnancy is no longer seen.   Heterogenous intra-cavitary clot is noted measuring 3.4   x 2.2 x 0.7 cm. The  scar niche does not   demonstrate marked vascularity.   Sonographic surveillance and beta hCG surveillance   suggested until beta is zero.   Normal appearing ovaries.

## 2021-08-10 NOTE — ED PROVIDER NOTE - CLINICAL SUMMARY MEDICAL DECISION MAKING FREE TEXT BOX
31 yo F with PMH of Asthma, Thalassemia Minor, hyperthyroidism (methimazole) PSxH cholecystectomy, c-sections X 4,  (4 live births and 1 prior miscarriage), LMP 6/3 with recent dx of  scar ectopic pregnancy and received Methotrexate 13 days ago and s/p cervical cook balloon on  presents today for beta check.  will send hcg and c/s GYN

## 2021-08-17 ENCOUNTER — APPOINTMENT (OUTPATIENT)
Dept: ANTEPARTUM | Facility: CLINIC | Age: 31
End: 2021-08-17
Payer: MEDICAID

## 2021-08-17 ENCOUNTER — ASOB RESULT (OUTPATIENT)
Age: 31
End: 2021-08-17

## 2021-08-17 PROCEDURE — 76816 OB US FOLLOW-UP PER FETUS: CPT

## 2021-09-10 ENCOUNTER — EMERGENCY (EMERGENCY)
Facility: HOSPITAL | Age: 31
LOS: 1 days | Discharge: HOME | End: 2021-09-10
Attending: EMERGENCY MEDICINE | Admitting: EMERGENCY MEDICINE
Payer: MEDICAID

## 2021-09-10 VITALS
OXYGEN SATURATION: 100 % | WEIGHT: 139.99 LBS | HEART RATE: 95 BPM | DIASTOLIC BLOOD PRESSURE: 77 MMHG | TEMPERATURE: 99 F | HEIGHT: 60 IN | RESPIRATION RATE: 18 BRPM | SYSTOLIC BLOOD PRESSURE: 119 MMHG

## 2021-09-10 VITALS
OXYGEN SATURATION: 100 % | DIASTOLIC BLOOD PRESSURE: 70 MMHG | HEART RATE: 87 BPM | SYSTOLIC BLOOD PRESSURE: 120 MMHG | RESPIRATION RATE: 18 BRPM | TEMPERATURE: 99 F

## 2021-09-10 DIAGNOSIS — D56.9 THALASSEMIA, UNSPECIFIED: ICD-10-CM

## 2021-09-10 DIAGNOSIS — U07.1 COVID-19: ICD-10-CM

## 2021-09-10 DIAGNOSIS — F17.200 NICOTINE DEPENDENCE, UNSPECIFIED, UNCOMPLICATED: ICD-10-CM

## 2021-09-10 DIAGNOSIS — E05.00 THYROTOXICOSIS WITH DIFFUSE GOITER WITHOUT THYROTOXIC CRISIS OR STORM: ICD-10-CM

## 2021-09-10 DIAGNOSIS — Z98.891 HISTORY OF UTERINE SCAR FROM PREVIOUS SURGERY: Chronic | ICD-10-CM

## 2021-09-10 DIAGNOSIS — Z90.49 ACQUIRED ABSENCE OF OTHER SPECIFIED PARTS OF DIGESTIVE TRACT: ICD-10-CM

## 2021-09-10 DIAGNOSIS — R50.9 FEVER, UNSPECIFIED: ICD-10-CM

## 2021-09-10 DIAGNOSIS — J45.909 UNSPECIFIED ASTHMA, UNCOMPLICATED: ICD-10-CM

## 2021-09-10 DIAGNOSIS — Z90.49 ACQUIRED ABSENCE OF OTHER SPECIFIED PARTS OF DIGESTIVE TRACT: Chronic | ICD-10-CM

## 2021-09-10 LAB — SARS-COV-2 RNA SPEC QL NAA+PROBE: DETECTED

## 2021-09-10 PROCEDURE — U0005: CPT

## 2021-09-10 PROCEDURE — 99283 EMERGENCY DEPT VISIT LOW MDM: CPT

## 2021-09-10 PROCEDURE — U0003: CPT

## 2021-09-10 PROCEDURE — 99284 EMERGENCY DEPT VISIT MOD MDM: CPT

## 2021-09-10 RX ORDER — ACETAMINOPHEN 500 MG
650 TABLET ORAL ONCE
Refills: 0 | Status: COMPLETED | OUTPATIENT
Start: 2021-09-10 | End: 2021-09-10

## 2021-09-10 RX ADMIN — Medication 650 MILLIGRAM(S): at 02:50

## 2021-09-10 RX ADMIN — Medication 650 MILLIGRAM(S): at 01:50

## 2021-09-10 NOTE — ED ADULT NURSE NOTE - OBJECTIVE STATEMENT
Pt presented to the ED with complaints of fever and headache. As per pt, this started yesterday afternoon, pt took an Advil with no relief, pt states that she returned this past Tuesday from [place] and would like to be tested for covid. Pt is alert and oriented, ambulatory, denies chest pain, SOB, palpitations, nausea, vomiting, lightheadedness, or dizziness.

## 2021-09-10 NOTE — ED PROVIDER NOTE - ENMT, MLM
Airway patent, Nasal mucosa clear. Mouth with normal mucosa. Throat has no vesicles, no oropharyngeal exudates and uvula is midline.  no intraoral swelling

## 2021-09-10 NOTE — ED PROVIDER NOTE - NSFOLLOWUPINSTRUCTIONS_ED_ALL_ED_FT
COVID-19 (Coronavirus Disease 2019)    WHAT YOU NEED TO KNOW:    Coronavirus disease 2019 (COVID-19) is the disease caused by a coronavirus first discovered in 2019. Coronaviruses generally cause upper respiratory (nose, throat, and lung) infections, such as a cold. The new virus spreads quickly and easily. The virus can be spread starting 2 days before symptoms even begin. The virus has also changed into several new forms (called variants) since it was discovered. The variants may be more contagious (easily spread) than the original form. Some may also cause more severe illness than others. It is important to follow local, national, and worldwide measures to protect yourself and others from infection.    DISCHARGE INSTRUCTIONS:    If you think you or someone you know may be infected: Do the following to protect others:   •If emergency care is needed, tell the  about the possible infection, or call ahead and tell the emergency department.    •Call a healthcare provider for instructions if symptoms are mild. Anyone who may be infected should not arrive without calling first. The provider will need to protect staff members and other patients.    •The person who may be infected needs to wear a face covering while getting medical care. This will help lower the risk of infecting others. Coverings are not used for anyone who is younger than 2 years, has breathing problems, or cannot remove it. The provider can give you instructions for anyone who cannot wear a covering.    Call your local emergency number (911 in the [place]) or an emergency department if:   •You have trouble breathing or shortness of breath at rest.    •You have chest pain or pressure that lasts longer than 5 minutes.    •You become confused or hard to wake.    •Your lips or face are blue.    •You have a fever of 104°F (40°C) or higher.    Call your doctor if:   •You do not have symptoms of COVID-19 but had close physical contact within 14 days with someone who tested positive.    •You have questions or concerns about your condition or care.    Medicines: You may need any of the following for mild symptoms:   •Decongestants help reduce nasal congestion and help you breathe more easily. If you take decongestant pills, they may make you feel restless or cause problems with your sleep. Do not use decongestant sprays for more than a few days.    •Cough suppressants help reduce coughing. Ask your healthcare provider which type of cough medicine is best for you.    •Acetaminophen decreases pain and fever. It is available without a doctor's order. Ask how much to take and how often to take it. Follow directions. Read the labels of all other medicines you are using to see if they also contain acetaminophen, or ask your doctor or pharmacist. Acetaminophen can cause liver damage if not taken correctly. Do not use more than 4 grams (4,000 milligrams) total of acetaminophen in one day.     •NSAIDs, such as ibuprofen, help decrease swelling, pain, and fever. NSAIDs can cause stomach bleeding or kidney problems in certain people. If you take blood thinner medicine, always ask your healthcare provider if NSAIDs are safe for you. Always read the medicine label and follow directions.    •Take your medicine as directed. Contact your healthcare provider if you think your medicine is not helping or if you have side effects. Tell him or her if you are allergic to any medicine. Keep a list of the medicines, vitamins, and herbs you take. Include the amounts, and when and why you take them. Bring the list or the pill bottles to follow-up visits. Carry your medicine list with you in case of an emergency.    How the 2019 coronavirus spreads: The following are ways the virus is thought to spread, but more information may be coming:   •Droplets are the main way all coronaviruses spread. The virus travels in droplets that form when a person talks, coughs, or sneezes. The droplets can also float in the air for minutes or hours. Infection happens when you breathe in the droplets or get them in your eyes or nose. Close personal contact with an infected person increases your risk for infection. This means being within 6 feet (2 meters) of the person for at least 15 minutes over 24 hours.    •Person-to-person contact can spread the virus. For example, a person with the virus on his or her hands can spread it by shaking hands with someone.    •The virus can stay on objects and surfaces for a short time. You may become infected by touching the object or surface and then touching your eyes or mouth.    •An infected animal may be able to infect a person who touches it. This may happen at live markets or on a farm.    Help lower the risk for COVID-19: The best way to prevent infection is to avoid anyone who is infected, but this can be hard to do. An infected person can spread the virus before signs or symptoms begin, or even if signs or symptoms never develop. The following can help lower the risk for infection:     Prevent COVID-19 Infection     •Wash your hands often throughout the day. Use soap and water. Rub your soapy hands together, lacing your fingers, for at least 20 seconds. Rinse with warm, running water. Dry your hands with a clean towel or paper towel. Use hand  that contains alcohol if soap and water are not available. Teach children how to wash their hands and use hand .    •Cover sneezes and coughs. Turn your face away and cover your mouth and nose with a tissue. Throw the tissue away. Use the bend of your arm if a tissue is not available. Then wash your hands well with soap and water or use hand . Teach children how to cover a cough or sneeze.    •Wear a face covering (mask) around anyone who does not live in your home. Use a cloth covering with at least 2 layers. You can also create layers by putting a cloth covering over a disposable non-medical mask. Cover your mouth and your nose. The covering should fit snugly against the bridge of your nose. Securely fasten it under your chin and on the sides of your face. Do not wear a plastic face shield instead of a covering. Continue social distancing and washing your hands often. A face covering is not a substitute for social distancing safety measures.    •Follow worldwide, national, and local social distancing guidelines. Keep at least 6 feet (2 meters) between you and others. Also keep this distance from anyone who comes to your home, such as someone making a delivery. Wear a face covering while you are around others. You will need to wear a covering in restaurants, stores, and other public buildings. You will also need a covering on mass transit, such as a bus, subway, or airplane. Remember to use a covering made from thick material or wear 2 coverings together.    •Make a habit of not touching your face. If you get the virus on your hands, you can transfer it to your eyes, nose, or mouth and become infected. You can also transfer it to objects, surfaces, or people. Do not touch your eyes, nose, or mouth without washing your hands first.    •Clean and disinfect high-touch surfaces and objects often. Use disinfecting wipes, or make a solution of 4 teaspoons of bleach in 1 quart (4 cups) of water. Clean and disinfect even if you think no one living in or coming to your home is infected with the virus.    •Ask about vaccines you may need. Get a COVID-19 vaccine when it is available to you. The current vaccines are given as a shot in 1 or 2 doses. Get the influenza (flu) vaccine as soon as recommended each year, usually starting in September or October. Get the pneumonia vaccine if recommended.    Follow social distancing guidelines: National and local social distancing rules vary. Rules may change over time as restrictions are lifted. Restrictions may return if an outbreak happens where you live. It is important to know and follow all current social distancing rules in your area. The following are general guidelines:  •Stay home if you are sick or think you may have COVID-19. It is important to stay home if you are waiting for a testing appointment or for test results. Even if you do not have symptoms, you can pass the virus to others.    •Limit trips out of your home. Have food, medicines, and other supplies delivered and left at your door or other area, if possible. Plan trips out of your home so you make the fewest stops possible to limit close personal contact. Keep track of places you go. This will help contact tracers notify others if you become infected.    •Avoid close physical contact with anyone who does not live in your home. Do not shake hands with, hug, or kiss a person as a greeting. If you must use public transportation (such as a bus or subway), try to sit or stand away from others. Only allow necessary people into your home. Wear your face covering, and remind others to wear a face covering. Remind them to wash their hands when they arrive and before they leave. Do not let someone into your home or go to someone's home just to visit. Even if you both do not feel sick, the virus can pass from one of you to the other.    •Avoid in-person gatherings and crowds. Gatherings or crowds of 10 or more individuals can cause the virus to spread. Avoid places such as diamond, beaches, sporting events, and tourist attractions. For events such as parties, holiday meals, [employer]ond[employer] services, and conferences, attend virtually (on a computer), if possible.    •Ask your healthcare provider for other ways to have appointments. Some providers offer phone, video, or other types of appointments. You may also be able to get prescriptions for a few months of your medicines at a time.    •Stay safe if you must go out to work. Keep physical distance between you and other workers as much as possible. Follow your employer's rules so everyone stays safe.    If you have COVID-19 and are recovering at home, healthcare providers will give you specific instructions to follow. The following are general guidelines to remind you how to keep others safe until you are well:   •Wash your hands often. Use soap and water as much as possible. Use hand  that contains alcohol if soap and water are not available. Dry your hands with a clean towel or paper towel. Do not share towels with anyone. If you use paper towels, throw them away in a lined trash can kept in your room or area. Use a covered trash can, if possible.    •Do not go out of your home unless it is necessary. Ask someone who is not infected to go out for groceries or supplies, or have them delivered. Do not go to your healthcare provider's office without an appointment.    •Only have close physical contact with a person giving direct care, or a baby or child you must care for. Family members and friends should not visit you. If possible, stay in a separate area or room of your home if you live with others. No one should go into the area or room except to give you care. You can visit with others by phone, video chat, e-mail, or similar systems.    •Wear a face covering while others are near you. This can help prevent droplets from spreading the virus when you talk, sneeze, or cough. Put the covering on before anyone comes into your room or area. Remind the person to cover his or her nose and mouth before coming in to provide care for you.    •Do not share items. Do not share dishes, towels, or other items with anyone. Items need to be washed after you use them.    •Protect your baby. Some newborns have tested positive for the virus. It is not known if they became infected before or after birth. The highest risk is when a  has close contact with an infected person. If you are pregnant or breastfeeding, talk to your healthcare provider or obstetrician about any concerns you have. He or she will tell you when to bring your baby in for check-ups and vaccines. He or she will also tell you what to do if you think your baby was infected with the coronavirus. Wash your hands and put on a clean face covering before you breastfeed or care for your baby.    •Do not handle live animals unless it is necessary. Some animals, including pets, have been infected with the new coronavirus. Ask someone who is not infected to take care of your pet until you are well. If you must care for a pet, wear a face covering. Wash your hands before and after you give care. Talk to your healthcare provider about how to keep a service animal safe, if needed.    •Follow directions from your healthcare provider for being around others after you recover. It is not known if or for how long a recovered person can pass the virus to others. Your provider may give you instructions, such as continuing social distancing and wearing a face covering. He or she will tell you when it is okay to be around others again. This may be 10 to 20 days after symptoms started or you had a positive test. Most symptoms will also need to be gone. Your provider will give you more information.    Follow up with your doctor as directed: Write down your questions so you remember to ask them during your visits.    For more information:   •Centers for Disease Control and Prevention  [address] [place] [address]  Phone: 1-174.989.5551  Web Address: http://www.cdc.gov

## 2021-09-10 NOTE — ED PROVIDER NOTE - OBJECTIVE STATEMENT
30F hx asthma, thalassemia, graves disease, c/o feeling unwell today. pt states having fever tonight.  no cough, no n/v/d. no abd pain. no chest pain. no SOB. states feeling congested.  pt states daughter sick with cough. pt recently returned from [place].  has not had covid vaccine.

## 2021-09-10 NOTE — ED PROVIDER NOTE - PROGRESS NOTE DETAILS
covid+, recommend quarantine. pt offered MAB, however declined at this time  I have discussed the discharge plan with the patient. The patient agrees with the plan, as discussed.  The patient understands Emergency Department diagnosis is a preliminary diagnosis often based on limited information and that the patient must adhere to the follow-up plan as discussed.  The patient understands that if the symptoms worsen the patient may return to the Emergency Department at any time for further evaluation and treatment.

## 2021-09-10 NOTE — ED PROVIDER NOTE - CLINICAL SUMMARY MEDICAL DECISION MAKING FREE TEXT BOX
fever, congestion, no SOB, no resp distress, nontoxic, no abd pain, no chest pain. likely viral in origin  -covid swab  -tylenol

## 2021-09-10 NOTE — ED ADULT NURSE NOTE - NS PRO PASSIVE SMOKE EXP
Patient with ESRD with MWF HD, unsure of last dialysis as patient is not answering questions  Unlikely to have gotten Friday dialysis as he was in ED; may need Saturday dialysis    Currently hypertensive on admission    Plan:  - Consult nephro; appreciate recs     Unknown

## 2021-09-10 NOTE — ED PROVIDER NOTE - PATIENT PORTAL LINK FT
You can access the FollowMyHealth Patient Portal offered by [facility] by registering at the following website: [email or web address]. By joining [employer]’s FollowMyHealth portal, you will also be able to view your health information using other applications (apps) compatible with our system.

## 2021-12-27 NOTE — ED ADULT TRIAGE NOTE - MEANS OF ARRIVAL
·	Right Foot Wound - Adaptic to Open Area - Abd pad - Kerlix Wrap - change daily  ·	Measure wound size minimum 1xweek - Wednesdays  ·	Encourage patient to keep RLE elevated when sitting in chair   ·	ID consult 
·	Right Foot Wound - cleanse with normal saline, leave moist wound bed, - Santyl, apply 2mm thick layer to entire wound bed, Adaptic,  - Abd pad - Kerlix Wrap - change daily  ·	Measure wound size minimum 1xweek - Wednesdays  ·	Encourage patient to keep RLE elevated when sitting in chair   ·	ID consult r/t cellulitis 
ambulatory

## 2022-01-01 NOTE — DISCHARGE NOTE PROVIDER - NSDCHOSPICE_GEN_A_CORE
No Brief Hospital Summary: 26 week  transferred fro [facility] after found to have esophageal perforation. Intubated on CMV initially but then developed pneumonia with antibiotic therapy for ____ days. NPO since birth as per surgery request.         Circumcision:  Hip US rec:    Neurodevelop eval?	  CPR class done?  	  PVS at DC?  Vit D at DC?	  FE at DC?    G6PD screen sent on  ____ . Result ______ . 	    PMD:          Name:  ______________ _             Contact information:  ______________ _  Pharmacy: Name:  ______________ _              Contact information:  ______________ _    Follow-up appointments (list):      [ _ ] Discharge time spent >30 min    [ _ ] Car Seat Challenge lasting 90 min was performed. Today I have reviewed and interpreted the nurses’ records of pulse oximetry, heart rate and respiratory rate and observations during testing period. Car Seat Challenge  passed. The patient is cleared to begin using rear-facing car seat upon discharge. Parents were counseled on rear-facing car seat use.

## 2022-02-17 RX ORDER — METHIMAZOLE 10 MG/1
0 TABLET ORAL
Qty: 0 | Refills: 0 | DISCHARGE

## 2022-02-17 RX ORDER — ALBUTEROL 90 UG/1
0 AEROSOL, METERED ORAL
Qty: 0 | Refills: 0 | DISCHARGE

## 2022-02-17 RX ORDER — FLUTICASONE FUROATE AND VILANTEROL TRIFENATATE 100; 25 UG/1; UG/1
1 POWDER RESPIRATORY (INHALATION)
Qty: 0 | Refills: 0 | DISCHARGE

## 2022-03-22 ENCOUNTER — APPOINTMENT (OUTPATIENT)
Dept: PULMONOLOGY | Facility: CLINIC | Age: 32
End: 2022-03-22

## 2022-05-26 ENCOUNTER — RX RENEWAL (OUTPATIENT)
Age: 32
End: 2022-05-26

## 2022-05-26 PROBLEM — E05.00 THYROTOXICOSIS WITH DIFFUSE GOITER WITHOUT THYROTOXIC CRISIS OR STORM: Chronic | Status: ACTIVE | Noted: 2021-08-02

## 2022-06-24 ENCOUNTER — LABORATORY RESULT (OUTPATIENT)
Age: 32
End: 2022-06-24

## 2022-06-24 ENCOUNTER — APPOINTMENT (OUTPATIENT)
Dept: PULMONOLOGY | Facility: CLINIC | Age: 32
End: 2022-06-24
Payer: MEDICAID

## 2022-06-24 VITALS
HEART RATE: 84 BPM | WEIGHT: 151 LBS | HEIGHT: 60 IN | DIASTOLIC BLOOD PRESSURE: 81 MMHG | SYSTOLIC BLOOD PRESSURE: 123 MMHG | BODY MASS INDEX: 29.64 KG/M2 | TEMPERATURE: 98.8 F | OXYGEN SATURATION: 98 %

## 2022-06-24 PROCEDURE — 95012 NITRIC OXIDE EXP GAS DETER: CPT

## 2022-06-24 PROCEDURE — 99214 OFFICE O/P EST MOD 30 MIN: CPT | Mod: 25

## 2022-06-24 PROCEDURE — 94060 EVALUATION OF WHEEZING: CPT

## 2022-06-24 RX ORDER — ALBUTEROL SULFATE 0.63 MG/3ML
0.63 SOLUTION RESPIRATORY (INHALATION)
Qty: 75 | Refills: 0 | Status: ACTIVE | COMMUNITY
Start: 2022-03-23

## 2022-06-24 RX ORDER — NEOMYCIN AND POLYMYXIN B SULFATES AND HYDROCORTISONE OTIC 10; 3.5; 1 MG/ML; MG/ML; [USP'U]/ML
3.5-10000-1 SUSPENSION AURICULAR (OTIC)
Qty: 10 | Refills: 0 | Status: ACTIVE | COMMUNITY
Start: 2022-02-28

## 2022-06-24 RX ORDER — ERGOCALCIFEROL 1.25 MG/1
1.25 MG CAPSULE, LIQUID FILLED ORAL
Qty: 4 | Refills: 0 | Status: ACTIVE | COMMUNITY
Start: 2022-01-08

## 2022-06-24 RX ORDER — METHIMAZOLE 5 MG/1
5 TABLET ORAL
Qty: 30 | Refills: 0 | Status: ACTIVE | COMMUNITY
Start: 2022-05-26

## 2022-06-24 RX ORDER — NORETHINDRONE ACETATE AND ETHINYL ESTRADIOL 1; 20 MG/1; UG/1
1-20 TABLET ORAL
Qty: 63 | Refills: 0 | Status: ACTIVE | COMMUNITY
Start: 2021-10-19

## 2022-06-24 NOTE — ASSESSMENT
[FreeTextEntry1] : Data reviewed:\par \par PA/lat CXR [place] 12/28/17: clear lungs\par \par [place] 2/26/18: severe obstruction, FEV1 49% / FENO 93\par P[place] 2/26/18: moderate obstruction, FEV1 57%, sig response to IBD, normal volumes and DLCO\par [place] 3/26/18: mod obstruction, FEV1 64%\par [place] 5/17/18: mod obstruction, FEV1 69% / FENO 79\par [place] 7/5/18: mod obstruction, FEV1 69%\par [place] 1/31/20: mild obstruction, FEV1 72%\par [place] 3/16/21: mod-sev obstruction, FEV1 59%\par [place] 6/24/22: mod obstruction, FEV1 66%, no sig BD response / FENO 116\par \par Impression:\par Moderate persistent asthma, not well-controlled\par \par Plan:\par She has probably been harmed by her insurance dropping Breo as she is now on medium dose ICS and she needs high dose. I did give her a Trelegy 200 sample to work with but we will pursue PA for Breo. Symbicort is inadequate for her.\par She should consider biologics given her rate of exacerbations. 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 Labs sent for phenotyping and will speak next week.

## 2022-06-24 NOTE — HISTORY OF PRESENT ILLNESS
[TextBox_4] : 2/26/18: Asked to evaluate patient by Dr Moore for asthma. She was seen at [facility] a month ago on Flovent and was increased to Flovent 220mcg 2 puffs bid + Serevent bid. Misses 1-2x a week. On this regimen she needs her rescue less, 1-2x a week. Sx are dyspnea and wheeze. No more nocturnal awakenings. Has been hospitalized in the [facility] 3-4x a year, none in the last year. Maybe 1 course steroids last year. 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 Traveling to [facility] soon.\par \par 3/26/18: Using Breo daily and no longer needing rescue, no nocturnal awakenings, no limitations. On montelukast. Traveling to [facility] in about 3 weeks.\par \par 5/17/18: Went to the [facility] and got her 4 girls and is stressed. Running low on Ventolin. Needed neb last night. She returned a week ago or so - she can't remember and refers to her small daughter. Needing albuterol qod or so. Taking Breo every night, misses it twice a week. Still on montelukast. No prednisone since seeing me last.\par \par 7/5/18 [[place]]: Patient returned for a scheduled visit. She has not picked up her Arnuity but has not had symptoms since her last visit. She states that she has not used her rescue inhaler at all, and is fully compliant with her Breo Ellipta. She requested a new nebulizer though as her last one broke down and she would like to have one in case she experiences acute respiratory symptoms. \par  \par 1/31/20: Doing well on Breo, barely needs albuterol, no nocturnal symptoms, no activity limitation. Got back last time she ran out of Breo.\par \par 3/16/21: Had lap arely 6 days ago, is ok. Asthma has been ok but she gets symptomatic if she stops using it. She does have it now and is using it. Did not have Covid. No exacerbations. Uses albuterol less than weekly, nocturnal awakenings. Has not had Covid vax.\par \par 6/24/22: Breo is no longer covered so her primary gave her Symbicort which she's on bid and is working ok. Didn't like Trelegy (but what was the dose?). Using albuterol about biwk. Got Covid Sept 2021 and Jan 2022 and had not been vaccinated. 2-3 interval exacerbations treated w steroids at [facility]. Has inc chest congestion. [ESS] : 0

## 2022-06-28 ENCOUNTER — NON-APPOINTMENT (OUTPATIENT)
Age: 32
End: 2022-06-28

## 2022-06-28 LAB
A ALTERNATA IGE QN: <0.1 KUA/L
A FUMIGATUS IGE QN: <0.1 KUA/L
C ALBICANS IGE QN: <0.1 KUA/L
C HERBARUM IGE QN: <0.1 KUA/L
CAT DANDER IGE QN: 0.81 KUA/L
COMMON RAGWEED IGE QN: <0.1 KUA/L
D FARINAE IGE QN: 10.1 KUA/L
D PTERONYSS IGE QN: 11.1 KUA/L
DEPRECATED A ALTERNATA IGE RAST QL: 0
DEPRECATED A FUMIGATUS IGE RAST QL: 0
DEPRECATED C ALBICANS IGE RAST QL: 0
DEPRECATED C HERBARUM IGE RAST QL: 0
DEPRECATED CAT DANDER IGE RAST QL: 2
DEPRECATED COMMON RAGWEED IGE RAST QL: 0
DEPRECATED D FARINAE IGE RAST QL: 3
DEPRECATED D PTERONYSS IGE RAST QL: 3
DEPRECATED DOG DANDER IGE RAST QL: 5
DEPRECATED M RACEMOSUS IGE RAST QL: 0
DEPRECATED ROACH IGE RAST QL: 2
DEPRECATED TIMOTHY IGE RAST QL: 0
DEPRECATED WHITE OAK IGE RAST QL: 2
DOG DANDER IGE QN: 83.6 KUA/L
M RACEMOSUS IGE QN: <0.1 KUA/L
ROACH IGE QN: 1.54 KUA/L
TIMOTHY IGE QN: <0.1 KUA/L
TOTAL IGE SMQN RAST: 563 KU/L
WHITE OAK IGE QN: 0.97 KUA/L

## 2022-10-25 ENCOUNTER — NON-APPOINTMENT (OUTPATIENT)
Age: 32
End: 2022-10-25

## 2022-10-25 ENCOUNTER — APPOINTMENT (OUTPATIENT)
Dept: ENDOCRINOLOGY | Facility: CLINIC | Age: 32
End: 2022-10-25

## 2022-10-25 VITALS
HEIGHT: 60 IN | HEART RATE: 100 BPM | DIASTOLIC BLOOD PRESSURE: 83 MMHG | WEIGHT: 144 LBS | SYSTOLIC BLOOD PRESSURE: 124 MMHG | OXYGEN SATURATION: 96 % | TEMPERATURE: 97.2 F | BODY MASS INDEX: 28.27 KG/M2

## 2022-10-25 DIAGNOSIS — E05.90 THYROTOXICOSIS, UNSPECIFIED W/OUT THYROTOXIC CRISIS OR STORM: ICD-10-CM

## 2022-10-25 PROCEDURE — 99204 OFFICE O/P NEW MOD 45 MIN: CPT

## 2022-10-25 RX ORDER — SOFT LENS DISINFECTANT
SOLUTION, NON-ORAL MISCELLANEOUS
Qty: 1 | Refills: 0 | Status: DISCONTINUED | COMMUNITY
Start: 2018-06-18 | End: 2022-10-25

## 2022-10-25 RX ORDER — CHLORHEXIDINE GLUCONATE, 0.12% ORAL RINSE 1.2 MG/ML
0.12 SOLUTION DENTAL
Qty: 473 | Refills: 0 | Status: DISCONTINUED | COMMUNITY
Start: 2018-01-24 | End: 2022-10-25

## 2022-10-25 RX ORDER — FAMOTIDINE 40 MG/1
40 TABLET, FILM COATED ORAL
Qty: 14 | Refills: 0 | Status: DISCONTINUED | COMMUNITY
Start: 2022-05-01 | End: 2022-10-25

## 2022-10-25 RX ORDER — BENZONATATE 100 MG/1
100 CAPSULE ORAL
Qty: 20 | Refills: 0 | Status: DISCONTINUED | COMMUNITY
Start: 2022-04-16 | End: 2022-10-25

## 2022-10-25 RX ORDER — OMEPRAZOLE 40 MG/1
40 CAPSULE, DELAYED RELEASE ORAL
Qty: 14 | Refills: 0 | Status: DISCONTINUED | COMMUNITY
Start: 2022-05-01 | End: 2022-10-25

## 2022-10-25 RX ORDER — PREDNISONE 20 MG/1
20 TABLET ORAL
Qty: 20 | Refills: 0 | Status: DISCONTINUED | COMMUNITY
Start: 2017-12-28 | End: 2022-10-25

## 2022-10-25 RX ORDER — ATENOLOL 25 MG/1
25 TABLET ORAL
Qty: 90 | Refills: 0 | Status: DISCONTINUED | COMMUNITY
Start: 2022-05-26 | End: 2022-10-25

## 2022-10-25 RX ORDER — METHYLPREDNISOLONE 4 MG/1
4 TABLET ORAL
Qty: 21 | Refills: 0 | Status: DISCONTINUED | COMMUNITY
Start: 2022-03-22 | End: 2022-10-25

## 2022-10-25 RX ORDER — PENICILLIN V POTASSIUM 500 MG/1
500 TABLET, FILM COATED ORAL
Qty: 28 | Refills: 0 | Status: DISCONTINUED | COMMUNITY
Start: 2022-05-17 | End: 2022-10-25

## 2022-10-25 RX ORDER — PREDNISONE 20 MG/1
20 TABLET ORAL DAILY
Qty: 14 | Refills: 0 | Status: DISCONTINUED | COMMUNITY
Start: 2018-03-26 | End: 2022-10-25

## 2022-10-25 RX ORDER — AMOXICILLIN AND CLAVULANATE POTASSIUM 875; 125 MG/1; MG/1
875-125 TABLET, COATED ORAL
Qty: 14 | Refills: 0 | Status: DISCONTINUED | COMMUNITY
Start: 2022-03-01 | End: 2022-10-25

## 2022-10-25 NOTE — HISTORY OF PRESENT ILLNESS
[FreeTextEntry1] : Patient is a 32 yo woman here for endocrine consultation of hyperthyroidism.\par \par Diagnosed with hyperthyroidism in 2020 by PCP.  States she had palpitations and would get dizzy and irritated.  Patient was confused as to why she would get the palpitations and felt it wasn't from the asthma. On blood work, she was found to have hyperthyroidism.  Patient saw an endocrinologist at [facility] who started methimazole.  She took methimazole for two months but broke out into hives.  Has been off of thyroid medicines for one year.  Feels fine. Gets palpitations from time to time but could be due to asthma medicines.  May have some frequent stools but no diarrhea.  Some irritation remains but may be due to having four kids.  Has some heat intolerance.\par Sister: paternal half sister with hyperthyroidism.  Does not know the details\par No exposures to lithium/amiodarone/radiation\par Has not had any recent thyroid labs\par Reports she has bad asthma on maintenance inhalers

## 2022-10-25 NOTE — PHYSICAL EXAM
[Alert] : alert [Well Nourished] : well nourished [No Acute Distress] : no acute distress [EOMI] : extra ocular movement intact [Thyroid Not Enlarged] : the thyroid was not enlarged [No Respiratory Distress] : no respiratory distress [No Accessory Muscle Use] : no accessory muscle use [Clear to Auscultation] : lungs were clear to auscultation bilaterally [Normal S1, S2] : normal S1 and S2 [Normal Rate] : heart rate was normal [Normal Bowel Sounds] : normal bowel sounds [Not Tender] : non-tender [Soft] : abdomen soft [Normal Affect] : the affect was normal [Normal Insight/Judgement] : insight and judgment were intact [Normal Mood] : the mood was normal [de-identified] : +tremors

## 2022-10-25 NOTE — CONSULT LETTER
[Dear  ___] : Dear  [unfilled], [Consult Letter:] : I had the pleasure of evaluating your patient, [unfilled]. [Please see my note below.] : Please see my note below. [Consult Closing:] : Thank you very much for allowing me to participate in the care of this patient.  If you have any questions, please do not hesitate to contact me. [Sincerely,] : Sincerely, [FreeTextEntry3] : Mahsa Bourgeois MD

## 2022-10-25 NOTE — REVIEW OF SYSTEMS
[Palpitations] : palpitations [As Noted in HPI] : as noted in HPI [Diarrhea] : diarrhea [Fatigue] : no fatigue [Decreased Appetite] : appetite not decreased [Visual Field Defect] : no visual field defect [Dysphagia] : no dysphagia [Dysphonia] : no dysphonia [Chest Pain] : no chest pain [Slow Heart Rate] : heart rate is not slow [Fast Heart Rate] : heart rate is not fast [Shortness Of Breath] : no shortness of breath [Nausea] : no nausea [Constipation] : no constipation [Vomiting] : no vomiting [Headaches] : no headaches [Dizziness] : no dizziness [Tremors] : tremors [Pain/Numbness of Digits] : no pain/numbness of digits [Cold Intolerance] : no cold intolerance [Heat Intolerance] : heat intolerance

## 2022-10-25 NOTE — ASSESSMENT
[Methimazole Therapy/PTU Therapy] : Risks and benefits of methimazole therapy/PTU therapy were discussed with the patient, including rash, liver dysfunction, and agranulocytosis. Patient was instructed to call the office for flu-like symptoms (e.g. fever and sore-throat) [FreeTextEntry1] : Patient is a 30 yo woman here for endcorine consultation of hyperthyroidism\par \par 1.  Hyperthyroidism\par -differential diagnosis includes Grave's Disease, toxic nodule and thyroiditis to name a few\par -check TSH receptor antibody levels, TSH, total T3 and free T4 levels\par -heart rate goal of 70-80.  Patient's HR is about 100 and she states she took her asthma before walking here. On exam she also has mild arm tremors which she reports having for a long time\par -educated to avoid contrast dye, homeopathic/alternative medicines and amiodarone\par -discussed treatment options based on diagnosis.  If Grave's Disease/autoimmune hyperthyroidism is confirmed, management includes oral thionamides, MOSES and/or surgery.  Patient developed rash from methimazole so if she remains chemically hyperthyroid, we will have to consider a trial of PTU.  Discussed rash might still be possible with PTU\par -side effects of methimazole include agranulocytosis and elevation in liver enzymes.  If fever/sore throat/jaundice develop, patient to contact doctor\par \par Follow up depending on blood work\par

## 2022-10-26 ENCOUNTER — NON-APPOINTMENT (OUTPATIENT)
Age: 32
End: 2022-10-26

## 2022-10-26 LAB
T3 SERPL-MCNC: 178 NG/DL
T4 FREE SERPL-MCNC: 1 NG/DL
THYROGLOB AB SERPL-ACNC: <20 IU/ML
THYROPEROXIDASE AB SERPL IA-ACNC: 79.8 IU/ML
TSH SERPL-ACNC: 0.98 UIU/ML

## 2022-10-28 LAB
TSH RECEPTOR AB: 2.13 IU/L
TSI ACT/NOR SER: 1.3 IU/L

## 2023-01-23 ENCOUNTER — APPOINTMENT (OUTPATIENT)
Dept: PULMONOLOGY | Facility: CLINIC | Age: 33
End: 2023-01-23
Payer: MEDICAID

## 2023-01-23 VITALS
DIASTOLIC BLOOD PRESSURE: 70 MMHG | HEART RATE: 95 BPM | BODY MASS INDEX: 28.27 KG/M2 | WEIGHT: 144 LBS | HEIGHT: 60 IN | SYSTOLIC BLOOD PRESSURE: 94 MMHG | OXYGEN SATURATION: 98 % | TEMPERATURE: 97.6 F

## 2023-01-23 PROCEDURE — G0008: CPT

## 2023-01-23 PROCEDURE — 99214 OFFICE O/P EST MOD 30 MIN: CPT | Mod: 25

## 2023-01-23 PROCEDURE — 90686 IIV4 VACC NO PRSV 0.5 ML IM: CPT

## 2023-01-23 PROCEDURE — 94010 BREATHING CAPACITY TEST: CPT

## 2023-01-23 PROCEDURE — 95012 NITRIC OXIDE EXP GAS DETER: CPT

## 2023-01-23 NOTE — HISTORY OF PRESENT ILLNESS
[TextBox_4] : My patient since 2018 w asthma and 4 young children.\par \par 6/24/22: Breo is no longer covered so her primary gave her Symbicort which she's on bid and is working ok. Didn't like Trelegy (but what was the dose?). Using albuterol about biwk. Got Covid Sept 2021 and Jan 2022 and had not been vaccinated. 2-3 interval exacerbations treated w steroids at [facility]. Has inc chest congestion.\par \par 1/23/23: She is continuing the Symbicort at 2 puffs bid. She does not think she's had any exacerbations since seeing me last. She was out sick with RSV about a month ago. Kids are 13, 11, 8 and 7 and she took them to [place]. She feels ok right now. She's using albuterol 1-2x a month and no nocturnal awakenings. 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

## 2023-01-23 NOTE — ASSESSMENT
[FreeTextEntry1] : Data reviewed:\par \par IgE 563/ul (6/2022) w pos RAST\par \par PA/lat CXR [facility] 12/28/17: clear lungs\par \par [place] 2/26/18: severe obstruction, FEV1 49% / FENO 93\par PFT 2/26/18: moderate obstruction, FEV1 57%, sig response to IBD, normal volumes and DLCO\par [place] 3/26/18: mod obstruction, FEV1 64%\par [place] 5/17/18: mod obstruction, FEV1 69% / FENO 79\par [place] 7/5/18: mod obstruction, FEV1 69%\par [place] 1/31/20: mild obstruction, FEV1 72%\par [place] 3/16/21: mod-sev obstruction, FEV1 59%\par [place] 6/24/22: mod obstruction, FEV1 66%, no sig BD response / FENO 116\par [place] 1/23/23: mild obs, FEV1 73% / FENO 101\par \par Impression:\par Moderate persistent asthma, not well-controlled\par \par Plan:\par She remains a victim of random and inadequate insurance coverage. She needs a higher dose of ICS. Now that Breo is available as generic she may have coverage so we will try stepping up to Breo 200-25. Follow in 6 mos. Flu vaccine given.

## 2023-06-05 RX ORDER — ALBUTEROL SULFATE 90 UG/1
108 (90 BASE) INHALANT RESPIRATORY (INHALATION) EVERY 4 HOURS
Qty: 1 | Refills: 5 | Status: ACTIVE | COMMUNITY
Start: 2021-04-05 | End: 1900-01-01

## 2023-06-09 NOTE — ED PROVIDER NOTE - NS_EDPROVIDERDISPOUSERTYPE_ED_A_ED
Attending Attestation (For Attendings USE Only)... Bi-Rhombic Flap Text: The defect edges were debeveled with a #15 scalpel blade.  Given the location of the defect and the proximity to free margins a bi-rhombic flap was deemed most appropriate.  Using a sterile surgical marker, an appropriate rhombic flap was drawn incorporating the defect. The area thus outlined was incised deep to adipose tissue with a #15 scalpel blade.  The skin margins were undermined to an appropriate distance in all directions utilizing iris scissors.

## 2023-07-17 ENCOUNTER — APPOINTMENT (OUTPATIENT)
Dept: PULMONOLOGY | Facility: CLINIC | Age: 33
End: 2023-07-17
Payer: MEDICAID

## 2023-07-17 VITALS
DIASTOLIC BLOOD PRESSURE: 60 MMHG | HEIGHT: 60 IN | HEART RATE: 94 BPM | OXYGEN SATURATION: 99 % | TEMPERATURE: 98 F | SYSTOLIC BLOOD PRESSURE: 120 MMHG

## 2023-07-17 PROCEDURE — 99214 OFFICE O/P EST MOD 30 MIN: CPT | Mod: GC

## 2023-07-17 NOTE — REVIEW OF SYSTEMS
[Fever] : no fever [Fatigue] : no fatigue [Chills] : no chills [Poor Appetite] : no poor appetite [Sore Throat] : no sore throat [Sinus Problems] : no sinus problems [Cough] : no cough [Sputum] : no sputum

## 2023-07-17 NOTE — ASSESSMENT
[FreeTextEntry1] : Data reviewed:\par \par IgE 563/ul (6/2022) w pos RAST\par \par PA/lat CXR [facility] 12/28/17: clear lungs\par \par [place] 2/26/18: severe obstruction, FEV1 49% / FENO 93\par [place] 2/26/18: moderate obstruction, FEV1 57%, sig response to IBD, normal volumes and DLCO\par [place] 3/26/18: mod obstruction, FEV1 64%\par [place] 5/17/18: mod obstruction, FEV1 69% / FENO 79\par [place] 7/5/18: mod obstruction, FEV1 69%\par [place] 1/31/20: mild obstruction, FEV1 72%\par [place] 3/16/21: mod-sev obstruction, FEV1 59%\par [place] 6/24/22: mod obstruction, FEV1 66%, no sig BD response / FENO 116\par [place] 1/23/23: mild obs, FEV1 73% / FENO 101\par \par Impression:\par Moderate persistent asthma, not well-controlled\par \par However has not failed triple therapy. If asthma uncontrolled at next visit in 2 months, plan to obtain labs (eosinophils), and discuss starting biologics\par \par Plan:\par - discontinue symbicort, start trelegy 200/62.5/25 once daily. Patient given 2 months of samples supply while awaiting for insurance approval\par - c/w albuterol prn\par - spirometry prior to next visit \par --\par Attending Addendum\par \par Seen and examined by me and agree w above. Chronic insurance problems limit our care. Samples of Trelegy 200 given and will see her back in 2 mos. If controlled then we just need to find a way to keep her on high dose triple tx. If uncontrolled we can move to biologics.\par

## 2023-07-17 NOTE — HISTORY OF PRESENT ILLNESS
[TextBox_4] : My patient since 2018 w asthma and 4 young children.\par \par 6/24/22: Breo is no longer covered so her primary gave her Symbicort which she's on bid and is working ok. Didn't like Trelegy (but what was the dose?). Using albuterol about biwk. Got Covid Sept 2021 and Jan 2022 and had not been vaccinated. 2-3 interval exacerbations treated w steroids at [facility]. Has inc chest congestion.\par \par 1/23/23: She is continuing the Symbicort at 2 puffs bid. She does not think she's had any exacerbations since seeing me last. She was out sick with RSV about a month ago. Kids are 13, 11, 8 and 7 and she took them to [place]. She feels ok right now. She's using albuterol 1-2x a month and no nocturnal awakenings. 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 She continues to use symbicort twice daily and albuterol prn. Her asthma is not controlled. She has nighttime symptoms 3 times a week, using rescue albuterol inhaler 3 times per week, however she does not feel limited in her activity. She says she previously had better control using trelegy. No limitation on physical activity/activities of daily living. No cough, or increased coryzal symptoms. She thinks the air quality worsening may be related to her worsening control.  [ESS] : 6

## 2023-07-17 NOTE — PHYSICAL EXAM
[No Acute Distress] : no acute distress [Normal Appearance] : normal appearance [Normal Rate/Rhythm] : normal rate/rhythm [Normal S1, S2] : normal s1, s2 [No Resp Distress] : no resp distress [No Acc Muscle Use] : no acc muscle use [Clear to Auscultation Bilaterally] : clear to auscultation bilaterally

## 2023-08-19 ENCOUNTER — EMERGENCY (EMERGENCY)
Facility: HOSPITAL | Age: 33
LOS: 1 days | Discharge: HOME | End: 2023-08-19
Attending: STUDENT IN AN ORGANIZED HEALTH CARE EDUCATION/TRAINING PROGRAM | Admitting: STUDENT IN AN ORGANIZED HEALTH CARE EDUCATION/TRAINING PROGRAM
Payer: MEDICAID

## 2023-08-19 VITALS
RESPIRATION RATE: 18 BRPM | OXYGEN SATURATION: 98 % | DIASTOLIC BLOOD PRESSURE: 73 MMHG | TEMPERATURE: 98 F | HEIGHT: 60 IN | WEIGHT: 145.95 LBS | HEART RATE: 91 BPM | SYSTOLIC BLOOD PRESSURE: 109 MMHG

## 2023-08-19 DIAGNOSIS — J45.909 UNSPECIFIED ASTHMA, UNCOMPLICATED: ICD-10-CM
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
## 2023-08-19 DIAGNOSIS — R11.2 NAUSEA WITH VOMITING, UNSPECIFIED: ICD-10-CM

## 2023-08-19 DIAGNOSIS — Z98.891 HISTORY OF UTERINE SCAR FROM PREVIOUS SURGERY: Chronic | ICD-10-CM

## 2023-08-19 DIAGNOSIS — R19.7 DIARRHEA, UNSPECIFIED: ICD-10-CM

## 2023-08-19 DIAGNOSIS — T78.1XXA OTHER ADVERSE FOOD REACTIONS, NOT ELSEWHERE CLASSIFIED, INITIAL ENCOUNTER: ICD-10-CM

## 2023-08-19 DIAGNOSIS — Z90.49 ACQUIRED ABSENCE OF OTHER SPECIFIED PARTS OF DIGESTIVE TRACT: ICD-10-CM
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
## 2023-08-19 DIAGNOSIS — Z87.59 PERSONAL HISTORY OF OTHER COMPLICATIONS OF PREGNANCY, CHILDBIRTH AND THE PUERPERIUM: ICD-10-CM

## 2023-08-19 DIAGNOSIS — Z90.49 ACQUIRED ABSENCE OF OTHER SPECIFIED PARTS OF DIGESTIVE TRACT: Chronic | ICD-10-CM

## 2023-08-19 DIAGNOSIS — R11.0 NAUSEA: ICD-10-CM

## 2023-08-19 DIAGNOSIS — Z86.39 PERSONAL HISTORY OF OTHER ENDOCRINE, NUTRITIONAL AND METABOLIC DISEASE: ICD-10-CM

## 2023-08-19 LAB
ALBUMIN SERPL ELPH-MCNC: 4.4 G/DL — SIGNIFICANT CHANGE UP (ref 3.3–5)
ALP SERPL-CCNC: 78 U/L — SIGNIFICANT CHANGE UP (ref 40–120)
ALT FLD-CCNC: 25 U/L — SIGNIFICANT CHANGE UP (ref 10–45)
ANION GAP SERPL CALC-SCNC: 10 MMOL/L — SIGNIFICANT CHANGE UP (ref 5–17)
ANISOCYTOSIS BLD QL: SLIGHT — SIGNIFICANT CHANGE UP
AST SERPL-CCNC: 27 U/L — SIGNIFICANT CHANGE UP (ref 10–40)
BASOPHILS # BLD AUTO: 0 K/UL — SIGNIFICANT CHANGE UP (ref 0–0.2)
BASOPHILS NFR BLD AUTO: 0 % — SIGNIFICANT CHANGE UP (ref 0–2)
BILIRUB DIRECT SERPL-MCNC: 0.2 MG/DL — SIGNIFICANT CHANGE UP (ref 0–0.3)
BILIRUB INDIRECT FLD-MCNC: 0.2 MG/DL — SIGNIFICANT CHANGE UP (ref 0.2–1)
BILIRUB SERPL-MCNC: 0.4 MG/DL — SIGNIFICANT CHANGE UP (ref 0.2–1.2)
BUN SERPL-MCNC: 11 MG/DL — SIGNIFICANT CHANGE UP (ref 7–23)
CALCIUM SERPL-MCNC: 9.1 MG/DL — SIGNIFICANT CHANGE UP (ref 8.4–10.5)
CHLORIDE SERPL-SCNC: 106 MMOL/L — SIGNIFICANT CHANGE UP (ref 96–108)
CO2 SERPL-SCNC: 25 MMOL/L — SIGNIFICANT CHANGE UP (ref 22–31)
CREAT SERPL-MCNC: 0.62 MG/DL — SIGNIFICANT CHANGE UP (ref 0.5–1.3)
DACRYOCYTES BLD QL SMEAR: SLIGHT — SIGNIFICANT CHANGE UP
EGFR: 123 ML/MIN/1.73M2 — SIGNIFICANT CHANGE UP
EOSINOPHIL # BLD AUTO: 0 K/UL — SIGNIFICANT CHANGE UP (ref 0–0.5)
EOSINOPHIL NFR BLD AUTO: 0 % — SIGNIFICANT CHANGE UP (ref 0–6)
GLUCOSE SERPL-MCNC: 98 MG/DL — SIGNIFICANT CHANGE UP (ref 70–99)
HCG SERPL-ACNC: <0 MIU/ML — SIGNIFICANT CHANGE UP
HCT VFR BLD CALC: 35.7 % — SIGNIFICANT CHANGE UP (ref 34.5–45)
HGB BLD-MCNC: 10.8 G/DL — LOW (ref 11.5–15.5)
HYPOCHROMIA BLD QL: SLIGHT — SIGNIFICANT CHANGE UP
LIDOCAIN IGE QN: 30 U/L — SIGNIFICANT CHANGE UP (ref 7–60)
LYMPHOCYTES # BLD AUTO: 1.48 K/UL — SIGNIFICANT CHANGE UP (ref 1–3.3)
LYMPHOCYTES # BLD AUTO: 13.9 % — SIGNIFICANT CHANGE UP (ref 13–44)
MANUAL SMEAR VERIFICATION: SIGNIFICANT CHANGE UP
MCHC RBC-ENTMCNC: 20.9 PG — LOW (ref 27–34)
MCHC RBC-ENTMCNC: 30.3 GM/DL — LOW (ref 32–36)
MCV RBC AUTO: 69.1 FL — LOW (ref 80–100)
MICROCYTES BLD QL: SLIGHT — SIGNIFICANT CHANGE UP
MONOCYTES # BLD AUTO: 0 K/UL — SIGNIFICANT CHANGE UP (ref 0–0.9)
MONOCYTES NFR BLD AUTO: 0 % — LOW (ref 2–14)
NEUTROPHILS # BLD AUTO: 9.15 K/UL — HIGH (ref 1.8–7.4)
NEUTROPHILS NFR BLD AUTO: 85.2 % — HIGH (ref 43–77)
NEUTS BAND # BLD: 0.9 % — SIGNIFICANT CHANGE UP (ref 0–8)
OVALOCYTES BLD QL SMEAR: SLIGHT — SIGNIFICANT CHANGE UP
PLAT MORPH BLD: NORMAL — SIGNIFICANT CHANGE UP
PLATELET # BLD AUTO: 372 K/UL — SIGNIFICANT CHANGE UP (ref 150–400)
POIKILOCYTOSIS BLD QL AUTO: SLIGHT — SIGNIFICANT CHANGE UP
POLYCHROMASIA BLD QL SMEAR: SLIGHT — SIGNIFICANT CHANGE UP
POTASSIUM SERPL-MCNC: 4.5 MMOL/L — SIGNIFICANT CHANGE UP (ref 3.5–5.3)
POTASSIUM SERPL-SCNC: 4.5 MMOL/L — SIGNIFICANT CHANGE UP (ref 3.5–5.3)
PROT SERPL-MCNC: 7.7 G/DL — SIGNIFICANT CHANGE UP (ref 6–8.3)
RBC # BLD: 5.17 M/UL — SIGNIFICANT CHANGE UP (ref 3.8–5.2)
RBC # FLD: 14.6 % — HIGH (ref 10.3–14.5)
RBC BLD AUTO: ABNORMAL
SCHISTOCYTES BLD QL AUTO: SLIGHT — SIGNIFICANT CHANGE UP
SODIUM SERPL-SCNC: 141 MMOL/L — SIGNIFICANT CHANGE UP (ref 135–145)
WBC # BLD: 10.63 K/UL — HIGH (ref 3.8–10.5)
WBC # FLD AUTO: 10.63 K/UL — HIGH (ref 3.8–10.5)

## 2023-08-19 PROCEDURE — 85025 COMPLETE CBC W/AUTO DIFF WBC: CPT

## 2023-08-19 PROCEDURE — 80076 HEPATIC FUNCTION PANEL: CPT

## 2023-08-19 PROCEDURE — 99284 EMERGENCY DEPT VISIT MOD MDM: CPT | Mod: 25

## 2023-08-19 PROCEDURE — 84702 CHORIONIC GONADOTROPIN TEST: CPT

## 2023-08-19 PROCEDURE — 80048 BASIC METABOLIC PNL TOTAL CA: CPT

## 2023-08-19 PROCEDURE — 83690 ASSAY OF LIPASE: CPT

## 2023-08-19 PROCEDURE — 96374 THER/PROPH/DIAG INJ IV PUSH: CPT

## 2023-08-19 PROCEDURE — 36415 COLL VENOUS BLD VENIPUNCTURE: CPT

## 2023-08-19 PROCEDURE — 96375 TX/PRO/DX INJ NEW DRUG ADDON: CPT

## 2023-08-19 RX ORDER — ONDANSETRON 8 MG/1
4 TABLET, FILM COATED ORAL ONCE
Refills: 0 | Status: COMPLETED | OUTPATIENT
Start: 2023-08-19 | End: 2023-08-19

## 2023-08-19 RX ORDER — FAMOTIDINE 10 MG/ML
20 INJECTION INTRAVENOUS ONCE
Refills: 0 | Status: COMPLETED | OUTPATIENT
Start: 2023-08-19 | End: 2023-08-19

## 2023-08-19 RX ORDER — SODIUM CHLORIDE 9 MG/ML
1000 INJECTION INTRAMUSCULAR; INTRAVENOUS; SUBCUTANEOUS ONCE
Refills: 0 | Status: COMPLETED | OUTPATIENT
Start: 2023-08-19 | End: 2023-08-19

## 2023-08-19 RX ADMIN — ONDANSETRON 4 MILLIGRAM(S): 8 TABLET, FILM COATED ORAL at 23:17

## 2023-08-19 RX ADMIN — SODIUM CHLORIDE 1000 MILLILITER(S): 9 INJECTION INTRAMUSCULAR; INTRAVENOUS; SUBCUTANEOUS at 23:16

## 2023-08-19 RX ADMIN — Medication 30 MILLILITER(S): at 23:17

## 2023-08-19 RX ADMIN — FAMOTIDINE 20 MILLIGRAM(S): 10 INJECTION INTRAVENOUS at 23:17

## 2023-08-19 NOTE — ED ADULT NURSE NOTE - NSFALLUNIVINTERV_ED_ALL_ED
Bed/Stretcher in lowest position, wheels locked, appropriate side rails in place/Call bell, personal items and telephone in reach/Instruct patient to call for assistance before getting out of bed/chair/stretcher/Non-slip footwear applied when patient is off stretcher/[place] to call system/Physically safe environment - no spills, clutter or unnecessary equipment/Purposeful proactive rounding/Room/bathroom lighting operational, light cord in reach

## 2023-08-19 NOTE — ED PROVIDER NOTE - OBJECTIVE STATEMENT
32-year-old female no significant past history presenting with nausea, diarrhea since this afternoon.  Patient ate smoked salmon around noon, around 3  p.m., had nausea and several episodes of diarrhea.  Diarrhea has resolved, still feeling nauseous.  No abdominal pain, no fever or chills, no flank pain, no urinary complaints, no vaginal complaints.  ROS as above.

## 2023-08-19 NOTE — ED ADULT NURSE NOTE - OBJECTIVE STATEMENT
Pt presented to the ED with complaints of abdominal pain. As per pt, she ate salmon in the afternoon, started having left upper quadrant abdominal pain, nausea, vomiting, and diarrhea. On arrival, pt is alert and oriented, ambulatory, denies chest pain, SOB, palpitations, urinary symptoms, lightheadedness, or dizziness.

## 2023-08-19 NOTE — ED PROVIDER NOTE - NS ED ROS FT
Constitutional: No fever or chills  Eyes: No discharge or drainage  Ears, Nose, Mouth, Throat: No nasal discharge, no sore throat  Cardiovascular: No chest pain, no palpitations  Respiratory: No shortness of breath, no cough  Gastrointestinal: + nausea, no vomiting, no abdominal pain, +diarrhea, no constipation  Musculoskeletal: No joint pain, no swelling  Skin: No rashes or lesions  Neurological: No numbness, weakness, tingling, no headache  Psychiatric: No depression

## 2023-08-19 NOTE — ED ADULT TRIAGE NOTE - CHIEF COMPLAINT QUOTE
"I had salmon at 12noon and at 3pm I started having abdominal pain, nausea, vomiting, diarrhea and acid reflux."

## 2023-08-19 NOTE — ED PROVIDER NOTE - CLINICAL SUMMARY MEDICAL DECISION MAKING FREE TEXT BOX
31 yo f with nausea, diarrhea after eating smoked salmon, still nauseous, abd soft and nontender, will check basic labs, antiemetic, gi cocktail, reassess.

## 2023-08-19 NOTE — ED PROVIDER NOTE - NSFOLLOWUPINSTRUCTIONS_ED_ALL_ED_FT
Please stay well hydrated. Take zofran as needed for nausea. Return for worsening symptoms, worsening pain, fever, chills. Otherwise, please follow up with your primary physician.    I hope you feel better soon!    Sincerely,  Chayito Boyd MD

## 2023-08-19 NOTE — ED PROVIDER NOTE - PATIENT PORTAL LINK FT
You can access the FollowMyHealth Patient Portal offered by [facility] by registering at the following website: [email or web address]. By joining [employer]’s FollowMyHealth portal, you will also be able to view your health information using other applications (apps) compatible with our system.

## 2023-08-20 VITALS
TEMPERATURE: 97 F | OXYGEN SATURATION: 99 % | DIASTOLIC BLOOD PRESSURE: 58 MMHG | SYSTOLIC BLOOD PRESSURE: 113 MMHG | RESPIRATION RATE: 16 BRPM | HEART RATE: 86 BPM

## 2023-08-20 RX ORDER — ONDANSETRON 8 MG/1
1 TABLET, FILM COATED ORAL
Qty: 12 | Refills: 0
Start: 2023-08-20

## 2023-09-18 ENCOUNTER — APPOINTMENT (OUTPATIENT)
Dept: PULMONOLOGY | Facility: CLINIC | Age: 33
End: 2023-09-18

## 2023-11-07 NOTE — ED ADULT TRIAGE NOTE - AS HEIGHT TYPE
Virtual Brief Visit    This Visit is being completed by telephone. The Patient is located at Home and in the following state in which I hold an active license [place]    The patient was identified by name and date of birth. Oscar Del Cid was informed that this is a telemedicine visit and that the visit is being conducted through Telephone. My office door was closed. No one else was in the room. He acknowledged consent and understanding of privacy and security of the video platform. The patient has agreed to participate and understands they can discontinue the visit at any time. Patient is aware this is a billable service. Assessment/Plan:    Problem List Items Addressed This Visit    None  Visit Diagnoses       Irritant contact dermatitis due to solvent    -  Primary    Relevant Medications    methylPREDNISolone 4 MG tablet therapy pack    hydrOXYzine HCL (ATARAX) 25 mg tablet          Has rash on body. Neck is all red with a rash. Used triamcinolone and lesion got worse. Was also using clindamycin. Rash started 10/17/23. Had an allergy to epoxy and was mixing with resin and did not have a mask on at the time. Was heating up the epoxy to get it to remove the bubbles and was breathing this in. Rash on legs, arms. Is a burning itch. Has been using vasoline and antiseptic cream and lidocaine. Will trial hydroxyzine and medrol dose pack    Recent Visits  No visits were found meeting these conditions. Showing recent visits within past 7 days and meeting all other requirements  Today's Visits  Date Type Provider Dept   11/06/23 [address] Internal [facility]   Showing today's visits and meeting all other requirements  Future Appointments  No visits were found meeting these conditions.   Showing future appointments within next 150 days and meeting all other requirements         Visit Time  Total Visit Duration: 10 minutes
stated

## 2023-12-06 NOTE — ED ADULT NURSE NOTE - NS ED PATIENT SAFETY CONCERN
Additional Notes: Performing Provider: Dr Craft\\nRepairing Provider (if applicable):\\nProcedure: Mohs\\nSurgical Location: right ventral lateral proximal forearm\\nDiagnosis (per pathology report): scc\\nSize of lesion (size provided on pathology report):\\n\\nSee a physician for any heart conditions (If yes, who and for what): N\\nArtificial Heart Valves:N\\nMitral valve prolapse:N\\nHeart Murmur:N\\nPacemaker:N\\nDefibrillator:N\\nArtificial joints (if yes, indicate location and year):N\\nDoes the patient pre-op medicate with antibiotics (if yes, what medication):N\\n**Pharmacy: N\\nHistory of renal disease or on dialysis:N\\nHistory of liver disease:N\\nHistory of bleeding disorder:N\\nLow platelet count (if the patient can provider):N\\nWill patient discontinue blood thinners, including NSAIDS (Advil, Motrin, ibuprofen & fish oil) (discontinue only by provider order):N\\nImmunosuppressed:N\\nPatient verbalizes understanding of pre-operative instructions:Y\\n\\nNotes (if applicable): Render Risk Assessment In Note?: no Detail Level: Simple No

## 2024-03-15 ENCOUNTER — APPOINTMENT (OUTPATIENT)
Dept: PULMONOLOGY | Facility: CLINIC | Age: 34
End: 2024-03-15
Payer: MEDICAID

## 2024-03-15 VITALS
WEIGHT: 146 LBS | TEMPERATURE: 98.6 F | HEART RATE: 85 BPM | BODY MASS INDEX: 28.66 KG/M2 | DIASTOLIC BLOOD PRESSURE: 60 MMHG | SYSTOLIC BLOOD PRESSURE: 120 MMHG | OXYGEN SATURATION: 99 % | HEIGHT: 60 IN

## 2024-03-15 PROCEDURE — 99213 OFFICE O/P EST LOW 20 MIN: CPT | Mod: 25

## 2024-03-15 PROCEDURE — 95012 NITRIC OXIDE EXP GAS DETER: CPT

## 2024-03-15 PROCEDURE — 94010 BREATHING CAPACITY TEST: CPT

## 2024-03-15 RX ORDER — BUDESONIDE AND FORMOTEROL FUMARATE DIHYDRATE 160; 4.5 UG/1; UG/1
160-4.5 AEROSOL RESPIRATORY (INHALATION)
Qty: 1 | Refills: 5 | Status: DISCONTINUED | COMMUNITY
Start: 2022-05-01 | End: 2024-03-15

## 2024-03-15 RX ORDER — FLUTICASONE FUROATE AND VILANTEROL 200; 25 UG/1; UG/1
200-25 POWDER RESPIRATORY (INHALATION)
Qty: 3 | Refills: 3 | Status: DISCONTINUED | COMMUNITY
Start: 2018-02-26 | End: 2024-03-15

## 2024-03-15 RX ORDER — ALBUTEROL SULFATE 90 UG/1
108 (90 BASE) AEROSOL, METERED RESPIRATORY (INHALATION) EVERY 4 HOURS
Qty: 3 | Refills: 3 | Status: ACTIVE | COMMUNITY
Start: 2018-05-17 | End: 1900-01-01

## 2024-03-15 RX ORDER — FLUTICASONE FUROATE AND VILANTEROL 200; 25 UG/1; UG/1
200-25 POWDER RESPIRATORY (INHALATION)
Qty: 1 | Refills: 5 | Status: DISCONTINUED | COMMUNITY
Start: 2023-01-23 | End: 2024-03-15

## 2024-03-15 RX ORDER — ALBUTEROL SULFATE 2.5 MG/3ML
(2.5 MG/3ML) SOLUTION RESPIRATORY (INHALATION)
Qty: 1 | Refills: 11 | Status: ACTIVE | COMMUNITY
Start: 2017-03-01 | End: 1900-01-01

## 2024-03-15 NOTE — HISTORY OF PRESENT ILLNESS
[TextBox_4] : My patient since 2018 w asthma and 4 young children.  6/24/22: Breo is no longer covered so her primary gave her Symbicort which she's on bid and is working ok. Didn't like Trelegy (but what was the dose?). Using albuterol about biwk. Got Covid Sept 2021 and Jan 2022 and had not been vaccinated. 2-3 interval exacerbations treated w steroids at [facility]. Has inc chest congestion.  1/23/23: She is continuing the Symbicort at 2 puffs bid. She does not think she's had any exacerbations since seeing me last. She was out sick with RSV about a month ago. Kids are 13, 11, 8 and 7 and she took them to [place]. She feels ok right now. She's using albuterol 1-2x a month and no nocturnal awakenings. 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  7/17/2023 [Tremaine]: She did not get generic Breo approved as planned. She continues to use symbicort twice daily and albuterol prn. Her asthma is not controlled. She has nighttime symptoms 3 times a week, using rescue albuterol inhaler 3 times per week, however she does not feel limited in her activity. She says she previously had better control using trelegy. No limitation on physical activity/activities of daily living. No cough, or increased coryzal symptoms. She thinks the air quality worsening may be related to her worsening control.   3/15/2024: She was on Trelegy since seeing me last - was off it briefly due to pharmacy issues but back on it now daily. No interval exacerbations. Feels pretty good, just stuffy. Takes Trelegy at night incl last night. Still working as . Working outside during winter made symptoms worse.

## 2024-03-15 NOTE — ASSESSMENT
[FreeTextEntry1] : Data reviewed:  IgE 563/ul (6/2022) w pos RAST  PA/lat CXR [facility] 12/28/17: clear lungs  [place] 2/26/18: severe obstruction, FEV1 49% / FENO 93 [place] 2/26/18: moderate obstruction, FEV1 57%, sig response to IBD, normal volumes and DLCO [place] 3/26/18: mod obstruction, FEV1 64% [place] 5/17/18: mod obstruction, FEV1 69% / FENO 79 [place] 7/5/18: mod obstruction, FEV1 69% [place] 1/31/20: mild obstruction, FEV1 72% [place] 3/16/21: mod-sev obstruction, FEV1 59% [place] 6/24/22: mod obstruction, FEV1 66%, no sig BD response / FENO 116 [place] 1/23/23: mild obs, FEV1 73% / FENO 101 [place] 3/15/2024: mod obs, FEV1 65% / FENO 69  Impression: Moderate persistent asthma, better controlled  Plan: Cont Trelegy 200/62.5/25 once daily.  Return 3 mos w PFT.

## 2024-03-15 NOTE — PHYSICAL EXAM
[Normal Rate/Rhythm] : normal rate/rhythm [No Acute Distress] : no acute distress [No Murmurs] : no murmurs [Normal S1, S2] : normal s1, s2 [Clear to Auscultation Bilaterally] : clear to auscultation bilaterally [No Resp Distress] : no resp distress

## 2024-03-29 ENCOUNTER — EMERGENCY (EMERGENCY)
Facility: HOSPITAL | Age: 34
LOS: 1 days | Discharge: HOME | End: 2024-03-29
Attending: EMERGENCY MEDICINE | Admitting: EMERGENCY MEDICINE
Payer: MEDICAID

## 2024-03-29 VITALS
RESPIRATION RATE: 16 BRPM | SYSTOLIC BLOOD PRESSURE: 125 MMHG | WEIGHT: 149.91 LBS | TEMPERATURE: 98 F | HEART RATE: 95 BPM | OXYGEN SATURATION: 96 % | HEIGHT: 63 IN | DIASTOLIC BLOOD PRESSURE: 70 MMHG

## 2024-03-29 DIAGNOSIS — Z98.891 HISTORY OF UTERINE SCAR FROM PREVIOUS SURGERY: Chronic | ICD-10-CM

## 2024-03-29 DIAGNOSIS — M54.9 DORSALGIA, UNSPECIFIED: ICD-10-CM

## 2024-03-29 DIAGNOSIS — J45.909 UNSPECIFIED ASTHMA, UNCOMPLICATED: ICD-10-CM

## 2024-03-29 DIAGNOSIS — Z88.8 ALLERGY STATUS TO OTHER DRUGS, MEDICAMENTS AND BIOLOGICAL SUBSTANCES STATUS: ICD-10-CM

## 2024-03-29 DIAGNOSIS — Z90.49 ACQUIRED ABSENCE OF OTHER SPECIFIED PARTS OF DIGESTIVE TRACT: Chronic | ICD-10-CM

## 2024-03-29 DIAGNOSIS — O03.9 COMPLETE OR UNSPECIFIED SPONTANEOUS ABORTION WITHOUT COMPLICATION: ICD-10-CM

## 2024-03-29 DIAGNOSIS — O99.519 DISEASES OF THE RESPIRATORY SYSTEM COMPLICATING PREGNANCY, UNSPECIFIED TRIMESTER: ICD-10-CM

## 2024-03-29 PROCEDURE — 99284 EMERGENCY DEPT VISIT MOD MDM: CPT | Mod: 25

## 2024-03-29 NOTE — ED ADULT TRIAGE NOTE - CHIEF COMPLAINT QUOTE
"back pain to left lower left side for one week." pt pointing to left flank, denies urinary symptoms, pt reports she took Mifepristone on Monday and Misoprostol on Tuesday and is "spotting"

## 2024-03-30 VITALS
RESPIRATION RATE: 16 BRPM | OXYGEN SATURATION: 98 % | SYSTOLIC BLOOD PRESSURE: 112 MMHG | DIASTOLIC BLOOD PRESSURE: 64 MMHG | HEART RATE: 78 BPM

## 2024-03-30 LAB
ADD ON TEST-SPECIMEN IN LAB: SIGNIFICANT CHANGE UP
ANION GAP SERPL CALC-SCNC: 8 MMOL/L — SIGNIFICANT CHANGE UP (ref 5–17)
ANISOCYTOSIS BLD QL: SLIGHT — SIGNIFICANT CHANGE UP
APTT BLD: 28.4 SEC — SIGNIFICANT CHANGE UP (ref 24.5–35.6)
BASO STIPL BLD QL SMEAR: PRESENT — SIGNIFICANT CHANGE UP
BASOPHILS # BLD AUTO: 0.09 K/UL — SIGNIFICANT CHANGE UP (ref 0–0.2)
BASOPHILS NFR BLD AUTO: 0.9 % — SIGNIFICANT CHANGE UP (ref 0–2)
BUN SERPL-MCNC: 6 MG/DL — LOW (ref 7–23)
CALCIUM SERPL-MCNC: 9.5 MG/DL — SIGNIFICANT CHANGE UP (ref 8.4–10.5)
CHLORIDE SERPL-SCNC: 105 MMOL/L — SIGNIFICANT CHANGE UP (ref 96–108)
CO2 SERPL-SCNC: 27 MMOL/L — SIGNIFICANT CHANGE UP (ref 22–31)
CREAT SERPL-MCNC: 0.68 MG/DL — SIGNIFICANT CHANGE UP (ref 0.5–1.3)
DACRYOCYTES BLD QL SMEAR: SLIGHT — SIGNIFICANT CHANGE UP
EGFR: 119 ML/MIN/1.73M2 — SIGNIFICANT CHANGE UP
EOSINOPHIL # BLD AUTO: 0.84 K/UL — HIGH (ref 0–0.5)
EOSINOPHIL NFR BLD AUTO: 8.7 % — HIGH (ref 0–6)
GLUCOSE SERPL-MCNC: 105 MG/DL — HIGH (ref 70–99)
HCG SERPL-ACNC: HIGH MIU/ML
HCT VFR BLD CALC: 30.6 % — LOW (ref 34.5–45)
HGB BLD-MCNC: 9.5 G/DL — LOW (ref 11.5–15.5)
HYPOCHROMIA BLD QL: SLIGHT — SIGNIFICANT CHANGE UP
INR BLD: 1 — SIGNIFICANT CHANGE UP (ref 0.85–1.18)
LYMPHOCYTES # BLD AUTO: 1.83 K/UL — SIGNIFICANT CHANGE UP (ref 1–3.3)
LYMPHOCYTES # BLD AUTO: 19.1 % — SIGNIFICANT CHANGE UP (ref 13–44)
MACROCYTES BLD QL: SLIGHT — SIGNIFICANT CHANGE UP
MANUAL SMEAR VERIFICATION: SIGNIFICANT CHANGE UP
MCHC RBC-ENTMCNC: 21.4 PG — LOW (ref 27–34)
MCHC RBC-ENTMCNC: 31 GM/DL — LOW (ref 32–36)
MCV RBC AUTO: 68.9 FL — LOW (ref 80–100)
MICROCYTES BLD QL: SLIGHT — SIGNIFICANT CHANGE UP
MONOCYTES # BLD AUTO: 0.25 K/UL — SIGNIFICANT CHANGE UP (ref 0–0.9)
MONOCYTES NFR BLD AUTO: 2.6 % — SIGNIFICANT CHANGE UP (ref 2–14)
NEUTROPHILS # BLD AUTO: 6.6 K/UL — SIGNIFICANT CHANGE UP (ref 1.8–7.4)
NEUTROPHILS NFR BLD AUTO: 68.7 % — SIGNIFICANT CHANGE UP (ref 43–77)
OVALOCYTES BLD QL SMEAR: SLIGHT — SIGNIFICANT CHANGE UP
PLAT MORPH BLD: ABNORMAL
PLATELET # BLD AUTO: 358 K/UL — SIGNIFICANT CHANGE UP (ref 150–400)
POIKILOCYTOSIS BLD QL AUTO: SIGNIFICANT CHANGE UP
POLYCHROMASIA BLD QL SMEAR: SIGNIFICANT CHANGE UP
POTASSIUM SERPL-MCNC: 3.3 MMOL/L — LOW (ref 3.5–5.3)
POTASSIUM SERPL-SCNC: 3.3 MMOL/L — LOW (ref 3.5–5.3)
PROTHROM AB SERPL-ACNC: 11.4 SEC — SIGNIFICANT CHANGE UP (ref 9.5–13)
RBC # BLD: 4.44 M/UL — SIGNIFICANT CHANGE UP (ref 3.8–5.2)
RBC # FLD: 14.8 % — HIGH (ref 10.3–14.5)
RBC BLD AUTO: ABNORMAL
SCHISTOCYTES BLD QL AUTO: SLIGHT — SIGNIFICANT CHANGE UP
SMUDGE CELLS # BLD: PRESENT — SIGNIFICANT CHANGE UP
SODIUM SERPL-SCNC: 140 MMOL/L — SIGNIFICANT CHANGE UP (ref 135–145)
TARGETS BLD QL SMEAR: SLIGHT — SIGNIFICANT CHANGE UP
WBC # BLD: 9.6 K/UL — SIGNIFICANT CHANGE UP (ref 3.8–10.5)
WBC # FLD AUTO: 9.6 K/UL — SIGNIFICANT CHANGE UP (ref 3.8–10.5)

## 2024-03-30 PROCEDURE — 76817 TRANSVAGINAL US OBSTETRIC: CPT | Mod: 26

## 2024-03-30 PROCEDURE — 83735 ASSAY OF MAGNESIUM: CPT

## 2024-03-30 PROCEDURE — 85730 THROMBOPLASTIN TIME PARTIAL: CPT

## 2024-03-30 PROCEDURE — 80048 BASIC METABOLIC PNL TOTAL CA: CPT

## 2024-03-30 PROCEDURE — 99284 EMERGENCY DEPT VISIT MOD MDM: CPT | Mod: 25

## 2024-03-30 PROCEDURE — 76802 OB US < 14 WKS ADDL FETUS: CPT

## 2024-03-30 PROCEDURE — 85610 PROTHROMBIN TIME: CPT

## 2024-03-30 PROCEDURE — 76817 TRANSVAGINAL US OBSTETRIC: CPT

## 2024-03-30 PROCEDURE — 76775 US EXAM ABDO BACK WALL LIM: CPT

## 2024-03-30 PROCEDURE — 76815 OB US LIMITED FETUS(S): CPT | Mod: 26

## 2024-03-30 PROCEDURE — 76775 US EXAM ABDO BACK WALL LIM: CPT | Mod: 26

## 2024-03-30 PROCEDURE — 85027 COMPLETE CBC AUTOMATED: CPT

## 2024-03-30 PROCEDURE — 81001 URINALYSIS AUTO W/SCOPE: CPT

## 2024-03-30 PROCEDURE — 36415 COLL VENOUS BLD VENIPUNCTURE: CPT

## 2024-03-30 PROCEDURE — 76815 OB US LIMITED FETUS(S): CPT

## 2024-03-30 PROCEDURE — 81025 URINE PREGNANCY TEST: CPT

## 2024-03-30 PROCEDURE — 84702 CHORIONIC GONADOTROPIN TEST: CPT

## 2024-03-30 RX ORDER — POTASSIUM CHLORIDE 20 MEQ
40 PACKET (EA) ORAL ONCE
Refills: 0 | Status: COMPLETED | OUTPATIENT
Start: 2024-03-30 | End: 2024-03-30

## 2024-03-30 RX ADMIN — Medication 40 MILLIEQUIVALENT(S): at 02:06

## 2024-03-30 NOTE — ED PROVIDER NOTE - PATIENT PORTAL LINK FT
You can access the FollowMyHealth Patient Portal offered by [facility] by registering at the following website: [email or web address]. By joining [employer]’s FollowMyHealth portal, you will also be able to view your health information using other applications (apps) compatible with our system.

## 2024-03-30 NOTE — ED PROVIDER NOTE - OBJECTIVE STATEMENT
33F hx asthma, , LMP January, c/o left mid back pain. pt states took mifepristone monday and misoprostol tuesday. has had some vaginal spotting. no abd pain. no dysuria. no vomiting, no diarrhea. no fevers. states back pain occurs after she eats. no pain currently.

## 2024-03-30 NOTE — ED PROVIDER NOTE - PROGRESS NOTE DETAILS
no recurrence of pain, US c/w fetal demise, no hydronephrosis on US. recommend f/u with gyn  I have discussed the discharge plan with the patient. The patient agrees with the plan, as discussed.  The patient understands Emergency Department diagnosis is a preliminary diagnosis often based on limited information and that the patient must adhere to the follow-up plan as discussed.  The patient understands that if the symptoms worsen  the patient may return to the Emergency Department at any time for further evaluation and treatment.

## 2024-03-30 NOTE — ED PROVIDER NOTE - CLINICAL SUMMARY MEDICAL DECISION MAKING FREE TEXT BOX
pregnant, s/p taking medical  pills, now with intermittent left back pain, no abd pain. some spotting. no urinary symptoms, afebrile. possible ectopic, possible kidney stone, doubt pyelo  -check labs  -US to r/o ectopic, renal US r/o hydronephrosis  pt declined pain medication at this time

## 2024-03-30 NOTE — ED ADULT NURSE NOTE - OBJECTIVE STATEMENT
pt c/o 1 week of mid low back pain only after eating, states she has been able to lessen the pain by eating slower but tonight was out to dinner and ate her meal too fast and now has pain.   no n/v/d.   pt also is pregnant and took Mifepristone and Misoprostol but thinks it is not working although she has some spotting today.

## 2024-03-30 NOTE — ED PROVIDER NOTE - NSFOLLOWUPINSTRUCTIONS_ED_ALL_ED_FT
Follow-up with your gynecologist    Medication , Care After  The following information offers guidance on how to care for yourself after your procedure. Your health care provider may also give you more specific instructions. If you have problems or questions, contact your health care provider.    What can I expect after the procedure?  After the procedure, it is common to have:  Bleeding that lasts for a few hours or a few days. It may feel like you are having a heavy menstrual period.  Cramps that are similar to menstrual cramps.  A headache.  Diarrhea.  Nausea and vomiting.  Fever, chills, or hot flushes.  Dizziness.  Your next menstrual period will most likely start 4–6 weeks after the procedure, unless you start taking birth control pills.    Follow these instructions at home:    Medicines    Take over-the-counter and prescription medicines only as told by your health care provider.  Only take the medicines your health care provider recommends. Do not take aspirin. It can cause bleeding.    Activity    Do not have sex for 2–3 weeks or until your health care provider approves.  Rest and avoid activity that requires a lot of effort for 2–3 weeks.    General instructions    Do not douche or use tampons until your health care provider approves.  Ask your health care provider when you can start using hormonal birth control.  Keep all follow-up visits. This is important.  Contact a health care provider if:  You have a fever of 100.4°F (38°C) or higher.  You have pain that is not relieved by over-the-counter pain medicine.  You have a bad-smelling vaginal discharge.  You have pain or bleeding that gets worse.  You have any of the following symptoms for more than 24 hours:  Nausea.  Vomiting.  Diarrhea.  You need to change your pad more than once in an hour.  You do not have any bleeding after 3 days. This is a sign that the medicine may not be working.    Get help right away if:  You have severe cramps in your stomach, back, or abdomen.  You become light-headed, weak, or faint.    Summary  After the procedure, it is common to have bleeding, headache, diarrhea, nausea and vomiting, chills, and dizziness.  Take over-the-counter and prescription medicines only as told by your health care provider.  Do not have sex until your health care provider approves.  Keep all follow-up visits. This is important.  This information is not intended to replace advice given to you by your health care provider. Make sure you discuss any questions you have with your health care provider.

## 2024-06-17 ENCOUNTER — APPOINTMENT (OUTPATIENT)
Dept: PULMONOLOGY | Facility: CLINIC | Age: 34
End: 2024-06-17
Payer: MEDICAID

## 2024-06-17 VITALS
BODY MASS INDEX: 28.07 KG/M2 | HEIGHT: 60 IN | HEART RATE: 88 BPM | OXYGEN SATURATION: 98 % | WEIGHT: 143 LBS | SYSTOLIC BLOOD PRESSURE: 120 MMHG | DIASTOLIC BLOOD PRESSURE: 77 MMHG | TEMPERATURE: 97.8 F

## 2024-06-17 DIAGNOSIS — J45.40 MODERATE PERSISTENT ASTHMA, UNCOMPLICATED: ICD-10-CM

## 2024-06-17 PROCEDURE — 99213 OFFICE O/P EST LOW 20 MIN: CPT | Mod: 25

## 2024-06-17 PROCEDURE — 95012 NITRIC OXIDE EXP GAS DETER: CPT

## 2024-06-17 PROCEDURE — 94010 BREATHING CAPACITY TEST: CPT

## 2024-06-17 RX ORDER — FLUTICASONE FUROATE, UMECLIDINIUM BROMIDE AND VILANTEROL TRIFENATATE 200; 62.5; 25 UG/1; UG/1; UG/1
200-62.5-25 POWDER RESPIRATORY (INHALATION)
Qty: 3 | Refills: 3 | Status: ACTIVE | COMMUNITY
Start: 2023-07-17 | End: 1900-01-01

## 2024-06-17 NOTE — ASSESSMENT
[FreeTextEntry1] : Data reviewed:  IgE 563/ul (6/2022) w pos RAST  PA/lat CXR [facility] 12/28/17: clear lungs  [place] 2/26/18: severe obstruction, FEV1 49% / FENO 93 [place] 2/26/18: moderate obstruction, FEV1 57%, sig response to IBD, normal volumes and DLCO [place] 3/26/18: mod obstruction, FEV1 64% [place] 5/17/18: mod obstruction, FEV1 69% / FENO 79 [place] 7/5/18: mod obstruction, FEV1 69% [place] 1/31/20: mild obstruction, FEV1 72% [place] 3/16/21: mod-sev obstruction, FEV1 59% [place] 6/24/22: mod obstruction, FEV1 66%, no sig BD response / FENO 116 [place] 1/23/23: mild obs, FEV1 73% / FENO 101 [place] 3/15/2024: mod obs, FEV1 65% / FENO 69 [place] 6/17/2024: mild restriction, FEV1 72% / FENO 31  Impression: Moderate persistent asthma, better controlled  Plan: Cont Trelegy 200/62.5/25 once daily.  Return 6 mos or sooner if new problems. Caution at work w upcoming heat and poor air quality, keep albuterol on hand.

## 2024-06-17 NOTE — HISTORY OF PRESENT ILLNESS
[Never] : never [TextBox_4] : My patient since 2018 w asthma and 4 young children. Maintained on Trelegy.  3/15/2024: She was on Trelegy since seeing me last - was off it briefly due to pharmacy issues but back on it now daily. No interval exacerbations. Feels pretty good, just stuffy. Takes Trelegy at night incl last night. Still working as . Working outside during winter made symptoms worse.  6/17/2024: On her Trelegy every day and feels her asthma is a little worse, needing more albuterol, 2x yesterday. Not every day, but more so in the heat and with yelling at her  job. No interval emergencies and Trelegy is covered.

## 2024-12-09 ENCOUNTER — APPOINTMENT (OUTPATIENT)
Dept: PULMONOLOGY | Facility: CLINIC | Age: 34
End: 2024-12-09
Payer: MEDICAID

## 2024-12-09 PROCEDURE — 99213 OFFICE O/P EST LOW 20 MIN: CPT

## 2024-12-09 RX ORDER — AZELASTINE HYDROCHLORIDE 137 UG/1
137 SPRAY, METERED NASAL DAILY
Qty: 1 | Refills: 1 | Status: ACTIVE | COMMUNITY
Start: 2024-12-09 | End: 1900-01-01

## 2024-12-17 ENCOUNTER — APPOINTMENT (OUTPATIENT)
Dept: OTOLARYNGOLOGY | Facility: CLINIC | Age: 34
End: 2024-12-17

## 2024-12-17 NOTE — ED ADULT NURSE NOTE - NSFALLRSKINDICATORS_ED_ALL_ED
December 18, 2024    Kelli Menon  [address]  [facility] [address]             [facility]  [address]  [facility] [address]  Phone: [phone]  Fax: [phone] To whom it may concern:    My patient, Kelli Menon, is unable to work until further notice. Due to the following medical conditions: cellulitis, leg swelling, unable to get herself dressed, and perform daily living duties. We are asking that you back date this time from 12/4/2024 until further noticed.    Thank you  Dr.Erica Sanderson     
no

## 2025-03-06 NOTE — ED ADULT NURSE NOTE - PAIN: PRESENCE, MLM
complains of pain/discomfort
General Sunscreen Counseling: I recommended a broad spectrum sunscreen with a SPF of 30 or higher.  I explained that SPF 30 sunscreens block approximately 97 percent of the sun's harmful rays.  Sunscreens should be applied at least 15 minutes prior to expected sun exposure and then every 2 hours after that as long as sun exposure continues. If swimming or exercising sunscreen should be reapplied every 45 minutes to an hour after getting wet or sweating.  One ounce, or the equivalent of a shot glass full of sunscreen, is adequate to protect the skin not covered by a bathing suit. I also recommended a lip balm with a sunscreen as well. Sun protective clothing can be used in lieu of sunscreen but must be worn the entire time you are exposed to the sun's rays.
Detail Level: Detailed

## 2025-06-04 ENCOUNTER — APPOINTMENT (OUTPATIENT)
Dept: PULMONOLOGY | Facility: CLINIC | Age: 35
End: 2025-06-04
Payer: MEDICAID

## 2025-06-04 ENCOUNTER — NON-APPOINTMENT (OUTPATIENT)
Age: 35
End: 2025-06-04

## 2025-06-04 VITALS
SYSTOLIC BLOOD PRESSURE: 100 MMHG | HEART RATE: 89 BPM | WEIGHT: 150 LBS | HEIGHT: 60 IN | BODY MASS INDEX: 29.45 KG/M2 | TEMPERATURE: 98 F | OXYGEN SATURATION: 97 % | DIASTOLIC BLOOD PRESSURE: 60 MMHG

## 2025-06-04 DIAGNOSIS — J45.40 MODERATE PERSISTENT ASTHMA, UNCOMPLICATED: ICD-10-CM

## 2025-06-04 PROCEDURE — G2211 COMPLEX E/M VISIT ADD ON: CPT | Mod: NC

## 2025-06-04 PROCEDURE — 99213 OFFICE O/P EST LOW 20 MIN: CPT
